# Patient Record
Sex: FEMALE | Race: WHITE | Employment: FULL TIME | URBAN - METROPOLITAN AREA
[De-identification: names, ages, dates, MRNs, and addresses within clinical notes are randomized per-mention and may not be internally consistent; named-entity substitution may affect disease eponyms.]

---

## 2019-01-08 ENCOUNTER — HOSPITAL ENCOUNTER (EMERGENCY)
Age: 59
Discharge: HOME | End: 2019-01-08
Attending: EMERGENCY MEDICINE
Payer: COMMERCIAL

## 2019-01-08 ENCOUNTER — APPOINTMENT (OUTPATIENT)
Dept: GENERAL RADIOLOGY | Age: 59
End: 2019-01-08
Attending: EMERGENCY MEDICINE
Payer: COMMERCIAL

## 2019-01-08 VITALS
HEART RATE: 80 BPM | OXYGEN SATURATION: 98 % | HEIGHT: 65 IN | DIASTOLIC BLOOD PRESSURE: 88 MMHG | SYSTOLIC BLOOD PRESSURE: 198 MMHG | WEIGHT: 260 LBS | RESPIRATION RATE: 16 BRPM | TEMPERATURE: 98 F | BODY MASS INDEX: 43.32 KG/M2

## 2019-01-08 DIAGNOSIS — M54.31 SCIATICA OF RIGHT SIDE: Primary | ICD-10-CM

## 2019-01-08 DIAGNOSIS — I10 HYPERTENSION, UNSPECIFIED TYPE: ICD-10-CM

## 2019-01-08 LAB — GLUCOSE BLD STRIP.AUTO-MCNC: 113 MG/DL (ref 65–100)

## 2019-01-08 PROCEDURE — 74011250637 HC RX REV CODE- 250/637: Performed by: EMERGENCY MEDICINE

## 2019-01-08 PROCEDURE — 99283 EMERGENCY DEPT VISIT LOW MDM: CPT | Performed by: EMERGENCY MEDICINE

## 2019-01-08 PROCEDURE — 73502 X-RAY EXAM HIP UNI 2-3 VIEWS: CPT

## 2019-01-08 PROCEDURE — 82962 GLUCOSE BLOOD TEST: CPT

## 2019-01-08 RX ORDER — CLONIDINE HYDROCHLORIDE 0.1 MG/1
0.1 TABLET ORAL
Status: COMPLETED | OUTPATIENT
Start: 2019-01-08 | End: 2019-01-08

## 2019-01-08 RX ORDER — PREDNISONE 20 MG/1
TABLET ORAL
Qty: 13 TAB | Refills: 0 | Status: SHIPPED | OUTPATIENT
Start: 2019-01-08 | End: 2020-07-28

## 2019-01-08 RX ORDER — LISINOPRIL AND HYDROCHLOROTHIAZIDE 20; 25 MG/1; MG/1
1 TABLET ORAL DAILY
Qty: 30 TAB | Refills: 0 | Status: SHIPPED | OUTPATIENT
Start: 2019-01-08 | End: 2019-02-10

## 2019-01-08 RX ORDER — METAXALONE 800 MG/1
800 TABLET ORAL 4 TIMES DAILY
Qty: 20 TAB | Refills: 0 | Status: SHIPPED | OUTPATIENT
Start: 2019-01-08 | End: 2019-01-13

## 2019-01-08 RX ADMIN — CLONIDINE HYDROCHLORIDE 0.1 MG: 0.1 TABLET ORAL at 15:19

## 2019-01-08 NOTE — ED NOTES
I have reviewed discharge instructions with the patient. The patient verbalized understanding. Patient left ED via Discharge Method: ambulatory to Home with self. Opportunity for questions and clarification provided. Patient given 2 scripts. To continue your aftercare when you leave the hospital, you may receive an automated call from our care team to check in on how you are doing. This is a free service and part of our promise to provide the best care and service to meet your aftercare needs.  If you have questions, or wish to unsubscribe from this service please call [phone]. Thank you for Choosing our [facility] Emergency Department.

## 2019-01-08 NOTE — ED PROVIDER NOTES
Patient is a 77-year-old female with a history of hypertension, sciatica, chronic pain who comes to the ER today with multiple different complaints. She has been without a doctor for a while and not taking any prescription medications. She states that in the past few weeks she is having worsening pain in her lower back and her right hip. She has to stand all day for work and the pain is getting worse with that. She denies any bowel or bladder incontinence. The pain does radiate down the right leg. She is also noted her blood pressure to be elevated and has not been taking her medications for some time. She also complains of other various tingling sensations in her hands bilaterally and thinks she might be diabetic. The history is provided by the patient. Hip Pain This is a chronic problem. The current episode started more than 1 week ago. The problem occurs constantly. The problem has been gradually worsening. The pain is present in the right hip. The quality of the pain is described as aching and sharp. The pain is severe. Associated symptoms include numbness, tingling and back pain. Pertinent negatives include full range of motion and no neck pain. The symptoms are aggravated by movement, standing and activity. She has tried OTC pain medications for the symptoms. The treatment provided no relief. There has been no history of extremity trauma. Past Medical History:  
Diagnosis Date  Hypertension Past Surgical History:  
Procedure Laterality Date  HX ORTHOPAEDIC No family history on file. Social History Socioeconomic History  Marital status: SINGLE Spouse name: Not on file  Number of children: Not on file  Years of education: Not on file  Highest education level: Not on file Social Needs  Financial resource strain: Not on file  Food insecurity - worry: Not on file  Food insecurity - inability: Not on file  Transportation needs - medical: Not on file  Transportation needs - non-medical: Not on file Occupational History  Not on file Tobacco Use  Smoking status: Never Smoker Substance and Sexual Activity  Alcohol use: No  
 Drug use: No  
 Sexual activity: Not on file Other Topics Concern  Not on file Social History Narrative  Not on file ALLERGIES: Lortab [hydrocodone-acetaminophen] Review of Systems Constitutional: Negative for chills, diaphoresis, fatigue and fever. HENT: Negative. Eyes: Negative. Respiratory: Negative. Cardiovascular: Negative. Gastrointestinal: Negative. Endocrine: Negative. Genitourinary: Negative. Musculoskeletal: Positive for back pain. Negative for neck pain. Skin: Negative. Neurological: Positive for tingling, weakness and numbness. Negative for seizures and syncope. Vitals:  
 01/08/19 1437 BP: (!) 206/106 Pulse: 79 Resp: 16 Temp: 98.2 °F (36.8 °C) SpO2: 96% Weight: 117.9 kg (260 lb) Height: 5' 5\" (1.651 m) Physical Exam  
Constitutional: She is oriented to person, place, and time. She appears well-developed and well-nourished. overweight HENT:  
Head: Normocephalic and atraumatic. Eyes: Conjunctivae and EOM are normal. Pupils are equal, round, and reactive to light. Neck: Normal range of motion. Neck supple. Cardiovascular: Normal rate, regular rhythm and intact distal pulses. Pulmonary/Chest: Effort normal and breath sounds normal.  
Abdominal: Soft. There is no tenderness. There is no rebound and no guarding. Musculoskeletal: Normal range of motion. She exhibits tenderness. She exhibits no edema or deformity. Tender right hip and pain with any range of motion of the right hip Lymphadenopathy:  
  She has no cervical adenopathy. Neurological: She is alert and oriented to person, place, and time.  She has normal strength. No cranial nerve deficit or sensory deficit. GCS eye subscore is 4. GCS verbal subscore is 5. GCS motor subscore is 6. Skin: Skin is warm and dry. Capillary refill takes less than 2 seconds. No rash noted. Nursing note and vitals reviewed. MDM Number of Diagnoses or Management Options Diagnosis management comments: 3:46 PM 
Blood sugar is 113. Patient is reassured and advised she can follow up with a primary care doctor to follow this value. X-rays of the right hip are negative Clinically this all appears to be sciatica and untreated hypertension I will refill her blood pressure medicine and I will also prescribe a course of prednisone. She also states she has taken Skelaxin in the past with relief. Voice dictation software was used during the making of this note. This software is not perfect and grammatical and other typographical errors may be present. This note has been proofread, but may still contain errors. Saira Powers MD; 1/8/2019 @3:47 PM  
=================================================================== Amount and/or Complexity of Data Reviewed Clinical lab tests: ordered and reviewed Tests in the radiology section of CPT®: ordered and reviewed Review and summarize past medical records: yes Risk of Complications, Morbidity, and/or Mortality Presenting problems: low Diagnostic procedures: low Management options: low Patient Progress Patient progress: stable Procedures

## 2019-01-08 NOTE — DISCHARGE INSTRUCTIONS
Use the medications as prescribed and follow up with a primary care physician. Return to the emergency department for any other acute concerns.

## 2019-01-08 NOTE — ED TRIAGE NOTES
Patient reports right hip and leg pain when standing for last 4 days. Hx sciatica. HTN in triage. Chronic burning to feet and hands. States stopped taking all medications because \"primary doctor quit\".

## 2019-02-10 ENCOUNTER — HOSPITAL ENCOUNTER (EMERGENCY)
Age: 59
Discharge: HOME | End: 2019-02-10
Attending: EMERGENCY MEDICINE
Payer: COMMERCIAL

## 2019-02-10 VITALS
RESPIRATION RATE: 18 BRPM | WEIGHT: 248 LBS | BODY MASS INDEX: 42.34 KG/M2 | HEIGHT: 64 IN | SYSTOLIC BLOOD PRESSURE: 205 MMHG | TEMPERATURE: 98.7 F | HEART RATE: 80 BPM | OXYGEN SATURATION: 98 % | DIASTOLIC BLOOD PRESSURE: 122 MMHG

## 2019-02-10 DIAGNOSIS — Z76.0 MEDICATION REFILL: Primary | ICD-10-CM

## 2019-02-10 DIAGNOSIS — I10 ESSENTIAL HYPERTENSION: ICD-10-CM

## 2019-02-10 PROCEDURE — 99282 EMERGENCY DEPT VISIT SF MDM: CPT | Performed by: EMERGENCY MEDICINE

## 2019-02-10 RX ORDER — LISINOPRIL AND HYDROCHLOROTHIAZIDE 20; 25 MG/1; MG/1
1 TABLET ORAL DAILY
Qty: 30 TAB | Refills: 0 | Status: SHIPPED | OUTPATIENT
Start: 2019-02-10

## 2019-02-10 NOTE — ED PROVIDER NOTES
Patient has been out of lisinopril for 2 days. This medication was just recently started in the last few weeks. Patient has been on blood pressure medication in the past before. Blood pressures extremely elevated today patient is asymptomatic and denies chest pain, trouble breathing, headache, numbness tingling or weakness or any abdominal pain. No confusion or disorientation. She has an appointment later this month with Dr. Cinthya Santoyo. The history is provided by the patient. Medication Refill Pertinent negatives include no chest pain, no abdominal pain, no headaches and no shortness of breath. Past Medical History:  
Diagnosis Date  Hypertension Past Surgical History:  
Procedure Laterality Date  HX ORTHOPAEDIC No family history on file. Social History Socioeconomic History  Marital status: SINGLE Spouse name: Not on file  Number of children: Not on file  Years of education: Not on file  Highest education level: Not on file Social Needs  Financial resource strain: Not on file  Food insecurity - worry: Not on file  Food insecurity - inability: Not on file  Transportation needs - medical: Not on file  Transportation needs - non-medical: Not on file Occupational History  Not on file Tobacco Use  Smoking status: Never Smoker Substance and Sexual Activity  Alcohol use: No  
 Drug use: No  
 Sexual activity: Not on file Other Topics Concern  Not on file Social History Narrative  Not on file ALLERGIES: Lortab [hydrocodone-acetaminophen] Review of Systems Respiratory: Negative for chest tightness and shortness of breath. Cardiovascular: Negative for chest pain. Gastrointestinal: Negative for abdominal pain. Neurological: Negative for weakness, numbness and headaches. Psychiatric/Behavioral: Negative for confusion. Vitals:  
 02/10/19 4689 BP: (!) 201/127 Pulse: 80 Resp: 18  
 Temp: 98.7 °F (37.1 °C) SpO2: 98% Weight: 112.5 kg (248 lb) Height: 5' 4\" (1.626 m) Physical Exam  
Constitutional: She appears well-nourished. obese HENT:  
Mouth/Throat: Oropharynx is clear and moist.  
Eyes: Conjunctivae are normal.  
Cardiovascular: Normal rate, regular rhythm and normal heart sounds. Pulmonary/Chest: Effort normal and breath sounds normal.  
Abdominal: Soft. She exhibits no distension. There is no tenderness. Neurological: She is alert. Nursing note and vitals reviewed. MDM Number of Diagnoses or Management Options Diagnosis management comments: Asymptomatic hypertension. Medication refilled. Discussed the importance of high blood pressure treatment and the consequences of no treatment. Patient has follow-up appointment previously scheduled. Procedures

## 2019-02-10 NOTE — PROGRESS NOTES
I have reviewed discharge instructions with the patient. The patient verbalized understanding. Patient left ED via Discharge Method: ambulatory to Home with self Opportunity for questions and clarification provided. Patient given 1 scripts. To continue your aftercare when you leave the hospital, you may receive an automated call from our care team to check in on how you are doing. This is a free service and part of our promise to provide the best care and service to meet your aftercare needs.  If you have questions, or wish to unsubscribe from this service please call [phone]. Thank you for Choosing our [facility] Emergency Department.

## 2019-02-10 NOTE — ED TRIAGE NOTES
Patient reports needing Lisinopril 20 mg refill. Has PCP appointment 3/1/19 but ran out of pills yesterday. [facility] would not refill it with out being re seen. Patient has no complaints Telephone Encounter by Shaina Quintana APN at 10/15/18 08:45 PM     Author:  Shaina Quintana APN Service:  (none) Author Type:  Nurse Practitioner     Filed:  10/15/18 08:45 PM Encounter Date:  10/14/2018 Status:  Signed     :  Shaina Quintana APN (Nurse Practitioner)            See [employer] message.[TW1.1M]      Revision History        User Key Date/Time User Provider Type Action    > TW1.1 10/15/18 08:45 PM Shaina Quintana APN Nurse Practitioner Sign    M - Manual

## 2019-02-10 NOTE — DISCHARGE INSTRUCTIONS
Patient Education        Learning About ACE Inhibitors  Introduction    ACE (angiotensin-converting enzyme) inhibitors stop the release of an enzyme. This enzyme makes your blood vessels smaller. Without it, your blood vessels relax and get bigger. This lowers your blood pressure. These medicines also increase how much water and salt go into your urine. This also lowers blood pressure. You may take this kind of medicine if you have high blood pressure. Or you may take it if you have heart problems, kidney problems, or diabetes. If you have coronary artery disease, this medicine can help prevent heart attacks and strokes. Examples  · Benazepril (Lotensin)  · Lisinopril (Prinivil, Zestril)  · Ramipril (Altace)  This is not a complete list.  Possible side effects  Side effects may include:  · A cough. · Low blood pressure. This can make you feel dizzy or weak. · Too much potassium in your body. · Swelling. This may be a sign of an allergic reaction. You may have other side effects or reactions not listed here. Check the information that comes with your medicine. What to know about taking this medicine  · ACE inhibitors can cause a dry cough. Talk to your doctor if you have a dry cough. You may need a different medicine. · These medicines can cause an allergic reaction. This can cause a little swelling. Or it can cause red bumps on your skin that hurt. In rare cases, the swelling may make it hard for you to breathe. · Do not take this medicine if you are pregnant. And don't take it if you plan to become pregnant. · Take your medicines exactly as prescribed. Call your doctor if you think you are having a problem with your medicine. · Check with your doctor or pharmacist before you use any other medicines. This includes over-the-counter medicines. Make sure your doctor knows all of the medicines, vitamins, herbal products, and supplements you take. Taking some medicines together can cause problems.   · You may need regular blood tests. Where can you learn more? Go to http://deborah-kasia.info/. Enter P050 in the search box to learn more about \"Learning About ACE Inhibitors. \"  Current as of: July 22, 2018  Content Version: 11.9  © 5550-1500 [employer]. Care instructions adapted under license by [employer] (which disclaims liability or warranty for this information). If you have questions about a medical condition or this instruction, always ask your healthcare professional. [place] [address] any warranty or liability for your use of this information. Patient Education        High Blood Pressure: Care Instructions  Overview    It's normal for blood pressure to go up and down throughout the day. But if it stays up, you have high blood pressure. Another name for high blood pressure is hypertension. Despite what a lot of people think, high blood pressure usually doesn't cause headaches or make you feel dizzy or lightheaded. It usually has no symptoms. But it does increase your risk of stroke, heart attack, and other problems. You and your doctor will talk about your risks of these problems based on your blood pressure. Your doctor will give you a goal for your blood pressure. Your goal will be based on your health and your age. Lifestyle changes, such as eating healthy and being active, are always important to help lower blood pressure. You might also take medicine to reach your blood pressure goal.  Follow-up care is a key part of your treatment and safety. Be sure to make and go to all appointments, and call your doctor if you are having problems. It's also a good idea to know your test results and keep a list of the medicines you take. How can you care for yourself at home? Medical treatment  · If you stop taking your medicine, your blood pressure will go back up. You may take one or more types of medicine to lower your blood pressure.  Be safe with medicines. Take your medicine exactly as prescribed. Call your doctor if you think you are having a problem with your medicine. · Talk to your doctor before you start taking aspirin every day. Aspirin can help certain people lower their risk of a heart attack or stroke. But taking aspirin isn't right for everyone, because it can cause serious bleeding. · See your doctor regularly. You may need to see the doctor more often at first or until your blood pressure comes down. · If you are taking blood pressure medicine, talk to your doctor before you take decongestants or anti-inflammatory medicine, such as ibuprofen. Some of these medicines can raise blood pressure. · Learn how to check your blood pressure at home. Lifestyle changes  · Stay at a healthy weight. This is especially important if you put on weight around the waist. Losing even 10 pounds can help you lower your blood pressure. · If your doctor recommends it, get more exercise. Walking is a good choice. Bit by bit, increase the amount you walk every day. Try for at least 30 minutes on most days of the week. You also may want to swim, bike, or do other activities. · Avoid or limit alcohol. Talk to your doctor about whether you can drink any alcohol. · Try to limit how much sodium you eat to less than 2,300 milligrams (mg) a day. Your doctor may ask you to try to eat less than 1,500 mg a day. · Eat plenty of fruits (such as bananas and oranges), vegetables, legumes, whole grains, and low-fat dairy products. · Lower the amount of saturated fat in your diet. Saturated fat is found in animal products such as milk, cheese, and meat. Limiting these foods may help you lose weight and also lower your risk for heart disease. · Do not smoke. Smoking increases your risk for heart attack and stroke. If you need help quitting, talk to your doctor about stop-smoking programs and medicines. These can increase your chances of quitting for good.   When should you call for help? Call 911 anytime you think you may need emergency care. This may mean having symptoms that suggest that your blood pressure is causing a serious heart or blood vessel problem. Your blood pressure may be over 180/120.   For example, call 911 if:    · You have symptoms of a heart attack. These may include:  ? Chest pain or pressure, or a strange feeling in the chest.  ? Sweating. ? Shortness of breath. ? Nausea or vomiting. ? Pain, pressure, or a strange feeling in the back, neck, jaw, or upper belly or in one or both shoulders or arms. ? Lightheadedness or sudden weakness. ? A fast or irregular heartbeat.     · You have symptoms of a stroke. These may include:  ? Sudden numbness, tingling, weakness, or loss of movement in your face, arm, or leg, especially on only one side of your body. ? Sudden vision changes. ? Sudden trouble speaking. ? Sudden confusion or trouble understanding simple statements. ? Sudden problems with walking or balance. ? A sudden, severe headache that is different from past headaches.     · You have severe back or belly pain.    Do not wait until your blood pressure comes down on its own. Get help right away.   Call your doctor now or seek immediate care if:    · Your blood pressure is much higher than normal (such as 180/120 or higher), but you don't have symptoms.     · You think high blood pressure is causing symptoms, such as:  ? Severe headache.  ? Blurry vision.    Watch closely for changes in your health, and be sure to contact your doctor if:    · Your blood pressure measures higher than your doctor recommends at least 2 times. That means the top number is higher or the bottom number is higher, or both.     · You think you may be having side effects from your blood pressure medicine. Where can you learn more? Go to http://deborah-kasia.info/.   Enter U929 in the search box to learn more about \"High Blood Pressure: Care Instructions. \"  Current as of: July 22, 2018  Content Version: 11.9  © 7123-1419 [employer], [facility]. Care instructions adapted under license by [employer] (which disclaims liability or warranty for this information). If you have questions about a medical condition or this instruction, always ask your healthcare professional. [place] [address] any warranty or liability for your use of this information.

## 2020-07-20 ENCOUNTER — HOSPITAL ENCOUNTER (INPATIENT)
Age: 60
LOS: 8 days | Discharge: HOME HEALTH | DRG: 872 | End: 2020-07-28
Attending: EMERGENCY MEDICINE | Admitting: FAMILY MEDICINE
Payer: SUBSIDIZED

## 2020-07-20 ENCOUNTER — APPOINTMENT (OUTPATIENT)
Dept: ULTRASOUND IMAGING | Age: 60
DRG: 872 | End: 2020-07-20
Attending: EMERGENCY MEDICINE
Payer: SUBSIDIZED

## 2020-07-20 ENCOUNTER — APPOINTMENT (OUTPATIENT)
Dept: GENERAL RADIOLOGY | Age: 60
DRG: 872 | End: 2020-07-20
Attending: EMERGENCY MEDICINE
Payer: SUBSIDIZED

## 2020-07-20 ENCOUNTER — APPOINTMENT (OUTPATIENT)
Dept: CT IMAGING | Age: 60
DRG: 872 | End: 2020-07-20
Attending: EMERGENCY MEDICINE
Payer: SUBSIDIZED

## 2020-07-20 DIAGNOSIS — M54.31 BILATERAL SCIATICA: ICD-10-CM

## 2020-07-20 DIAGNOSIS — I10 MALIGNANT HYPERTENSION: Primary | ICD-10-CM

## 2020-07-20 DIAGNOSIS — R11.2 NON-INTRACTABLE VOMITING WITH NAUSEA, UNSPECIFIED VOMITING TYPE: ICD-10-CM

## 2020-07-20 DIAGNOSIS — M54.32 BILATERAL SCIATICA: ICD-10-CM

## 2020-07-20 DIAGNOSIS — M46.20 OSTEOMYELITIS OF SPINE (HCC): ICD-10-CM

## 2020-07-20 DIAGNOSIS — D35.01 ADRENAL ADENOMA, RIGHT: ICD-10-CM

## 2020-07-20 DIAGNOSIS — A41.01 SEPSIS DUE TO METHICILLIN SUSCEPTIBLE STAPHYLOCOCCUS AUREUS (MSSA) WITHOUT ACUTE ORGAN DYSFUNCTION (HCC): ICD-10-CM

## 2020-07-20 DIAGNOSIS — E66.01 MORBIDLY OBESE (HCC): ICD-10-CM

## 2020-07-20 DIAGNOSIS — R78.81 BACTEREMIA: ICD-10-CM

## 2020-07-20 PROBLEM — M19.90 ARTHRITIS: Status: ACTIVE | Noted: 2020-07-20

## 2020-07-20 PROBLEM — R65.10 SIRS (SYSTEMIC INFLAMMATORY RESPONSE SYNDROME) (HCC): Status: ACTIVE | Noted: 2020-07-20

## 2020-07-20 PROBLEM — E27.8 MASS OF RIGHT ADRENAL GLAND (HCC): Status: ACTIVE | Noted: 2020-07-20

## 2020-07-20 PROBLEM — E87.6 HYPOKALEMIA: Status: ACTIVE | Noted: 2020-07-20

## 2020-07-20 LAB
ALBUMIN SERPL-MCNC: 2.9 G/DL (ref 3.5–5)
ALBUMIN/GLOB SERPL: 0.6 {RATIO} (ref 1.2–3.5)
ALP SERPL-CCNC: 121 U/L (ref 50–136)
ALT SERPL-CCNC: 50 U/L (ref 12–65)
ANION GAP SERPL CALC-SCNC: 8 MMOL/L (ref 7–16)
AST SERPL-CCNC: 44 U/L (ref 15–37)
ATRIAL RATE: 94 BPM
BACTERIA URNS QL MICRO: 0 /HPF
BASOPHILS # BLD: 0 K/UL (ref 0–0.2)
BASOPHILS NFR BLD: 0 % (ref 0–2)
BILIRUB SERPL-MCNC: 0.8 MG/DL (ref 0.2–1.1)
BUN SERPL-MCNC: 14 MG/DL (ref 6–23)
CALCIUM SERPL-MCNC: 8.7 MG/DL (ref 8.3–10.4)
CALCULATED P AXIS, ECG09: 44 DEGREES
CALCULATED R AXIS, ECG10: 18 DEGREES
CALCULATED T AXIS, ECG11: 9 DEGREES
CASTS URNS QL MICRO: NORMAL /LPF
CHLORIDE SERPL-SCNC: 104 MMOL/L (ref 98–107)
CO2 SERPL-SCNC: 25 MMOL/L (ref 21–32)
CREAT SERPL-MCNC: 0.82 MG/DL (ref 0.6–1)
DIAGNOSIS, 93000: NORMAL
DIFFERENTIAL METHOD BLD: ABNORMAL
EOSINOPHIL # BLD: 0 K/UL (ref 0–0.8)
EOSINOPHIL NFR BLD: 0 % (ref 0.5–7.8)
EPI CELLS #/AREA URNS HPF: NORMAL /HPF
ERYTHROCYTE [DISTWIDTH] IN BLOOD BY AUTOMATED COUNT: 14.7 % (ref 11.9–14.6)
EST. AVERAGE GLUCOSE BLD GHB EST-MCNC: 131 MG/DL
GLOBULIN SER CALC-MCNC: 4.8 G/DL (ref 2.3–3.5)
GLUCOSE SERPL-MCNC: 122 MG/DL (ref 65–100)
HBA1C MFR BLD: 6.2 % (ref 4.8–6)
HCT VFR BLD AUTO: 39.6 % (ref 35.8–46.3)
HGB BLD-MCNC: 12.8 G/DL (ref 11.7–15.4)
IMM GRANULOCYTES # BLD AUTO: 0.2 K/UL (ref 0–0.5)
IMM GRANULOCYTES NFR BLD AUTO: 1 % (ref 0–5)
LACTATE SERPL-SCNC: 0.9 MMOL/L (ref 0.4–2)
LYMPHOCYTES # BLD: 0.5 K/UL (ref 0.5–4.6)
LYMPHOCYTES NFR BLD: 3 % (ref 13–44)
MAGNESIUM SERPL-MCNC: 1.8 MG/DL (ref 1.8–2.4)
MCH RBC QN AUTO: 28.9 PG (ref 26.1–32.9)
MCHC RBC AUTO-ENTMCNC: 32.3 G/DL (ref 31.4–35)
MCV RBC AUTO: 89.4 FL (ref 79.6–97.8)
MONOCYTES # BLD: 0.8 K/UL (ref 0.1–1.3)
MONOCYTES NFR BLD: 4 % (ref 4–12)
NEUTS SEG # BLD: 17.5 K/UL (ref 1.7–8.2)
NEUTS SEG NFR BLD: 92 % (ref 43–78)
NRBC # BLD: 0 K/UL (ref 0–0.2)
P-R INTERVAL, ECG05: 126 MS
PLATELET # BLD AUTO: 189 K/UL (ref 150–450)
PMV BLD AUTO: 12.3 FL (ref 9.4–12.3)
POTASSIUM SERPL-SCNC: 3 MMOL/L (ref 3.5–5.1)
PROT SERPL-MCNC: 7.7 G/DL (ref 6.3–8.2)
Q-T INTERVAL, ECG07: 370 MS
QRS DURATION, ECG06: 90 MS
QTC CALCULATION (BEZET), ECG08: 462 MS
RBC # BLD AUTO: 4.43 M/UL (ref 4.05–5.2)
RBC #/AREA URNS HPF: NORMAL /HPF
SODIUM SERPL-SCNC: 137 MMOL/L (ref 136–145)
TROPONIN-HIGH SENSITIVITY: 5.2 PG/ML (ref 0–14)
VENTRICULAR RATE, ECG03: 94 BPM
WBC # BLD AUTO: 19 K/UL (ref 4.3–11.1)
WBC URNS QL MICRO: NORMAL /HPF

## 2020-07-20 PROCEDURE — 65660000000 HC RM CCU STEPDOWN

## 2020-07-20 PROCEDURE — 74011250637 HC RX REV CODE- 250/637: Performed by: FAMILY MEDICINE

## 2020-07-20 PROCEDURE — 71046 X-RAY EXAM CHEST 2 VIEWS: CPT

## 2020-07-20 PROCEDURE — 65270000029 HC RM PRIVATE

## 2020-07-20 PROCEDURE — 87040 BLOOD CULTURE FOR BACTERIA: CPT

## 2020-07-20 PROCEDURE — 87186 SC STD MICRODIL/AGAR DIL: CPT

## 2020-07-20 PROCEDURE — 74011000250 HC RX REV CODE- 250: Performed by: EMERGENCY MEDICINE

## 2020-07-20 PROCEDURE — 96365 THER/PROPH/DIAG IV INF INIT: CPT

## 2020-07-20 PROCEDURE — 74011250636 HC RX REV CODE- 250/636: Performed by: FAMILY MEDICINE

## 2020-07-20 PROCEDURE — 83036 HEMOGLOBIN GLYCOSYLATED A1C: CPT

## 2020-07-20 PROCEDURE — 71270 CT THORAX DX C-/C+: CPT

## 2020-07-20 PROCEDURE — 85025 COMPLETE CBC W/AUTO DIFF WBC: CPT

## 2020-07-20 PROCEDURE — 74011250637 HC RX REV CODE- 250/637: Performed by: EMERGENCY MEDICINE

## 2020-07-20 PROCEDURE — 96376 TX/PRO/DX INJ SAME DRUG ADON: CPT

## 2020-07-20 PROCEDURE — 74011250636 HC RX REV CODE- 250/636: Performed by: EMERGENCY MEDICINE

## 2020-07-20 PROCEDURE — 77030038269 HC DRN EXT URIN PURWCK BARD -A

## 2020-07-20 PROCEDURE — 83735 ASSAY OF MAGNESIUM: CPT

## 2020-07-20 PROCEDURE — 76705 ECHO EXAM OF ABDOMEN: CPT

## 2020-07-20 PROCEDURE — 80053 COMPREHEN METABOLIC PANEL: CPT

## 2020-07-20 PROCEDURE — 96366 THER/PROPH/DIAG IV INF ADDON: CPT

## 2020-07-20 PROCEDURE — 83605 ASSAY OF LACTIC ACID: CPT

## 2020-07-20 PROCEDURE — 74011636320 HC RX REV CODE- 636/320: Performed by: EMERGENCY MEDICINE

## 2020-07-20 PROCEDURE — 87205 SMEAR GRAM STAIN: CPT

## 2020-07-20 PROCEDURE — 87150 DNA/RNA AMPLIFIED PROBE: CPT

## 2020-07-20 PROCEDURE — 96375 TX/PRO/DX INJ NEW DRUG ADDON: CPT

## 2020-07-20 PROCEDURE — 74011000258 HC RX REV CODE- 258: Performed by: EMERGENCY MEDICINE

## 2020-07-20 PROCEDURE — 74011000250 HC RX REV CODE- 250: Performed by: FAMILY MEDICINE

## 2020-07-20 PROCEDURE — 96368 THER/DIAG CONCURRENT INF: CPT

## 2020-07-20 PROCEDURE — 87077 CULTURE AEROBIC IDENTIFY: CPT

## 2020-07-20 PROCEDURE — 84484 ASSAY OF TROPONIN QUANT: CPT

## 2020-07-20 PROCEDURE — 74011000258 HC RX REV CODE- 258: Performed by: FAMILY MEDICINE

## 2020-07-20 PROCEDURE — 96372 THER/PROPH/DIAG INJ SC/IM: CPT

## 2020-07-20 PROCEDURE — 93005 ELECTROCARDIOGRAM TRACING: CPT | Performed by: EMERGENCY MEDICINE

## 2020-07-20 PROCEDURE — 99285 EMERGENCY DEPT VISIT HI MDM: CPT

## 2020-07-20 PROCEDURE — 81015 MICROSCOPIC EXAM OF URINE: CPT

## 2020-07-20 RX ORDER — BISACODYL 5 MG
5 TABLET, DELAYED RELEASE (ENTERIC COATED) ORAL DAILY PRN
Status: DISCONTINUED | OUTPATIENT
Start: 2020-07-20 | End: 2020-07-28 | Stop reason: HOSPADM

## 2020-07-20 RX ORDER — MORPHINE SULFATE 4 MG/ML
4 INJECTION INTRAVENOUS
Status: COMPLETED | OUTPATIENT
Start: 2020-07-20 | End: 2020-07-20

## 2020-07-20 RX ORDER — LABETALOL HYDROCHLORIDE 5 MG/ML
20 INJECTION, SOLUTION INTRAVENOUS ONCE
Status: COMPLETED | OUTPATIENT
Start: 2020-07-20 | End: 2020-07-20

## 2020-07-20 RX ORDER — ONDANSETRON 2 MG/ML
4 INJECTION INTRAMUSCULAR; INTRAVENOUS
Status: COMPLETED | OUTPATIENT
Start: 2020-07-20 | End: 2020-07-20

## 2020-07-20 RX ORDER — ONDANSETRON 2 MG/ML
4 INJECTION INTRAMUSCULAR; INTRAVENOUS
Status: DISCONTINUED | OUTPATIENT
Start: 2020-07-20 | End: 2020-07-28 | Stop reason: HOSPADM

## 2020-07-20 RX ORDER — HYDRALAZINE HYDROCHLORIDE 20 MG/ML
10 INJECTION INTRAMUSCULAR; INTRAVENOUS ONCE
Status: COMPLETED | OUTPATIENT
Start: 2020-07-20 | End: 2020-07-20

## 2020-07-20 RX ORDER — MORPHINE SULFATE 2 MG/ML
1 INJECTION, SOLUTION INTRAMUSCULAR; INTRAVENOUS
Status: DISCONTINUED | OUTPATIENT
Start: 2020-07-20 | End: 2020-07-28 | Stop reason: HOSPADM

## 2020-07-20 RX ORDER — DIPHENHYDRAMINE HYDROCHLORIDE 50 MG/ML
12.5 INJECTION, SOLUTION INTRAMUSCULAR; INTRAVENOUS
Status: DISCONTINUED | OUTPATIENT
Start: 2020-07-20 | End: 2020-07-28 | Stop reason: HOSPADM

## 2020-07-20 RX ORDER — HYDRALAZINE HYDROCHLORIDE 50 MG/1
50 TABLET, FILM COATED ORAL 3 TIMES DAILY
Status: DISCONTINUED | OUTPATIENT
Start: 2020-07-20 | End: 2020-07-21

## 2020-07-20 RX ORDER — NALOXONE HYDROCHLORIDE 0.4 MG/ML
0.4 INJECTION, SOLUTION INTRAMUSCULAR; INTRAVENOUS; SUBCUTANEOUS AS NEEDED
Status: DISCONTINUED | OUTPATIENT
Start: 2020-07-20 | End: 2020-07-28 | Stop reason: HOSPADM

## 2020-07-20 RX ORDER — HEPARIN SODIUM 5000 [USP'U]/ML
5000 INJECTION, SOLUTION INTRAVENOUS; SUBCUTANEOUS EVERY 8 HOURS
Status: DISCONTINUED | OUTPATIENT
Start: 2020-07-20 | End: 2020-07-21

## 2020-07-20 RX ORDER — SODIUM CHLORIDE 0.9 % (FLUSH) 0.9 %
10 SYRINGE (ML) INJECTION
Status: COMPLETED | OUTPATIENT
Start: 2020-07-20 | End: 2020-07-20

## 2020-07-20 RX ORDER — ISOSORBIDE MONONITRATE 30 MG/1
30 TABLET, EXTENDED RELEASE ORAL DAILY
Status: DISCONTINUED | OUTPATIENT
Start: 2020-07-20 | End: 2020-07-21

## 2020-07-20 RX ORDER — LANOLIN ALCOHOL/MO/W.PET/CERES
400 CREAM (GRAM) TOPICAL 2 TIMES DAILY
Status: DISCONTINUED | OUTPATIENT
Start: 2020-07-20 | End: 2020-07-28 | Stop reason: HOSPADM

## 2020-07-20 RX ORDER — SODIUM CHLORIDE 0.9 % (FLUSH) 0.9 %
5-40 SYRINGE (ML) INJECTION EVERY 8 HOURS
Status: DISCONTINUED | OUTPATIENT
Start: 2020-07-20 | End: 2020-07-28 | Stop reason: HOSPADM

## 2020-07-20 RX ORDER — SODIUM CHLORIDE 0.9 % (FLUSH) 0.9 %
5-40 SYRINGE (ML) INJECTION AS NEEDED
Status: DISCONTINUED | OUTPATIENT
Start: 2020-07-20 | End: 2020-07-28 | Stop reason: HOSPADM

## 2020-07-20 RX ORDER — ACETAMINOPHEN 325 MG/1
650 TABLET ORAL
Status: DISCONTINUED | OUTPATIENT
Start: 2020-07-20 | End: 2020-07-28 | Stop reason: HOSPADM

## 2020-07-20 RX ORDER — POTASSIUM CHLORIDE 20 MEQ/1
40 TABLET, EXTENDED RELEASE ORAL EVERY 4 HOURS
Status: COMPLETED | OUTPATIENT
Start: 2020-07-20 | End: 2020-07-20

## 2020-07-20 RX ORDER — KETOROLAC TROMETHAMINE 30 MG/ML
15 INJECTION, SOLUTION INTRAMUSCULAR; INTRAVENOUS
Status: COMPLETED | OUTPATIENT
Start: 2020-07-20 | End: 2020-07-20

## 2020-07-20 RX ORDER — LISINOPRIL AND HYDROCHLOROTHIAZIDE 20; 25 MG/1; MG/1
1 TABLET ORAL
Status: COMPLETED | OUTPATIENT
Start: 2020-07-20 | End: 2020-07-20

## 2020-07-20 RX ORDER — LISINOPRIL AND HYDROCHLOROTHIAZIDE 20; 25 MG/1; MG/1
1 TABLET ORAL DAILY
Status: DISCONTINUED | OUTPATIENT
Start: 2020-07-21 | End: 2020-07-28 | Stop reason: HOSPADM

## 2020-07-20 RX ORDER — OXYCODONE AND ACETAMINOPHEN 5; 325 MG/1; MG/1
1 TABLET ORAL
Status: DISCONTINUED | OUTPATIENT
Start: 2020-07-20 | End: 2020-07-28 | Stop reason: HOSPADM

## 2020-07-20 RX ADMIN — POTASSIUM CHLORIDE 40 MEQ: 20 TABLET, EXTENDED RELEASE ORAL at 14:31

## 2020-07-20 RX ADMIN — MORPHINE SULFATE 4 MG: 4 INJECTION INTRAVENOUS at 08:56

## 2020-07-20 RX ADMIN — POTASSIUM CHLORIDE 40 MEQ: 20 TABLET, EXTENDED RELEASE ORAL at 22:03

## 2020-07-20 RX ADMIN — Medication 10 ML: at 23:15

## 2020-07-20 RX ADMIN — HYDRALAZINE HYDROCHLORIDE 10 MG: 20 INJECTION INTRAMUSCULAR; INTRAVENOUS at 14:39

## 2020-07-20 RX ADMIN — SODIUM CHLORIDE 100 ML: 900 INJECTION, SOLUTION INTRAVENOUS at 11:57

## 2020-07-20 RX ADMIN — ONDANSETRON 4 MG: 2 INJECTION INTRAMUSCULAR; INTRAVENOUS at 08:56

## 2020-07-20 RX ADMIN — HEPARIN SODIUM 5000 UNITS: 5000 INJECTION INTRAVENOUS; SUBCUTANEOUS at 17:28

## 2020-07-20 RX ADMIN — OXYCODONE HYDROCHLORIDE AND ACETAMINOPHEN 1 TABLET: 5; 325 TABLET ORAL at 22:03

## 2020-07-20 RX ADMIN — SODIUM CHLORIDE 5 MG/HR: 900 INJECTION, SOLUTION INTRAVENOUS at 18:18

## 2020-07-20 RX ADMIN — LISINOPRIL AND HYDROCHLOROTHIAZIDE 1 TABLET: 25; 20 TABLET ORAL at 10:34

## 2020-07-20 RX ADMIN — HYDRALAZINE HYDROCHLORIDE 50 MG: 50 TABLET, FILM COATED ORAL at 23:15

## 2020-07-20 RX ADMIN — Medication 10 ML: at 11:57

## 2020-07-20 RX ADMIN — KETOROLAC TROMETHAMINE 15 MG: 30 INJECTION, SOLUTION INTRAMUSCULAR at 08:56

## 2020-07-20 RX ADMIN — SODIUM CHLORIDE 5 MG/HR: 900 INJECTION, SOLUTION INTRAVENOUS at 14:01

## 2020-07-20 RX ADMIN — ISOSORBIDE MONONITRATE 30 MG: 30 TABLET, EXTENDED RELEASE ORAL at 18:17

## 2020-07-20 RX ADMIN — MAGNESIUM GLUCONATE 500 MG ORAL TABLET 400 MG: 500 TABLET ORAL at 17:29

## 2020-07-20 RX ADMIN — MORPHINE SULFATE 4 MG: 4 INJECTION INTRAVENOUS at 11:24

## 2020-07-20 RX ADMIN — CEFTRIAXONE SODIUM 1 G: 1 INJECTION, POWDER, FOR SOLUTION INTRAMUSCULAR; INTRAVENOUS at 18:17

## 2020-07-20 RX ADMIN — IOPAMIDOL 100 ML: 755 INJECTION, SOLUTION INTRAVENOUS at 11:57

## 2020-07-20 RX ADMIN — LABETALOL HYDROCHLORIDE 20 MG: 5 INJECTION INTRAVENOUS at 11:23

## 2020-07-20 NOTE — H&P
Hospitalist H&P Note     Admit Date:  2020  7:36 AM   Name:  Haris Lynn   Age:  64 y.o.  :  1960   MRN:  [id number]   PCP:  Julianne Perry MD  Treatment Team: Attending Provider: Cezar Ornelas MD; Primary Nurse: Jeff Pineda RN  Vomiting, rt shoulder blade region pain  HPI:   64 yr old  female patient who follows up with [facility], off her blood pressure medication for past 1 week. Known history of hypertension, morbid obesity, arthritis, sciatica and spine issues. Pt says she is off blood pressure meds for a week. Since past 4 days started having sharp pain rt shoulder blade , then both shoulder blade region, constant, moderate intensity, not improving even after several doses of nsaid. Says also has sciatica pain more on the rt lower extremity. Started having vomiting, non bilious , about 7 episodes since last night. Dizziness, on and off since past 4 days ,since her blood pressures have been up. Pt otherwise doesn't c/o head or shortness of breath or any left sided chest pain. Wbc 19,k 3, bp 219/117  CT OF THE CHEST ABDOMEN AND PELVIS  1) Trace right pleural fusion. 2) Benign fat-containing 5.5 cm right adrenal adenoma. Pt presently on cardene drip for elevated bp. Pt will be admitted to ICU for malignant htn, hypokalemia and does meet the criteria for SIRS      10 systems reviewed and negative except as noted in HPI. Past Medical History:   Diagnosis Date    Adrenal adenoma, right 2020    Arthritis 2020    Hypertension       Past Surgical History:   Procedure Laterality Date    HX ORTHOPAEDIC        Allergies   Allergen Reactions    Lortab [Hydrocodone-Acetaminophen] Nausea and Vomiting      Social History     Tobacco Use    Smoking status: Never Smoker   Substance Use Topics    Alcohol use: No      History reviewed. No pertinent family history.    Immunization History   Administered Date(s) Administered    TD Vaccine 08/16/1993     PTA Medications:  Prior to Admission Medications   Prescriptions Last Dose Informant Patient Reported? Taking?   lisinopril-hydroCHLOROthiazide (PRINZIDE, ZESTORETIC) 20-25 mg per tablet   No No   Sig: Take 1 Tab by mouth daily. predniSONE (DELTASONE) 20 mg tablet   No No   Sig: 3 tabs day 1,2 then 2 tabs day 3,4 then one tab day 5,6 then 1/2 tab day 7,8      Facility-Administered Medications: None       Objective:     Patient Vitals for the past 24 hrs:   Temp Pulse Resp BP SpO2   07/20/20 1544  (!) 131 (!) 47 (!) 170/95 92 %   07/20/20 1514  (!) 146  (!) 233/116    07/20/20 1439  (!) 113  (!) 208/103    07/20/20 1423  (!) 106  (!) 222/111    07/20/20 1401  (!) 107  (!) 219/117    07/20/20 1150     93 %   07/20/20 1149     93 %   07/20/20 1148     93 %   07/20/20 1140  99  (!) 172/117 92 %   07/20/20 1135  96      07/20/20 1134  94      07/20/20 1133  95      07/20/20 1132  96      07/20/20 1131  97      07/20/20 1123  (!) 101      07/20/20 1107  87  (!) 215/105    07/20/20 1034  (!) 104  (!) 199/95    07/20/20 0739 99.5 °F (37.5 °C) (!) 103 18 (!) 230/122 95 %     Oxygen Therapy  O2 Sat (%): 92 % (07/20/20 1544)  Pulse via Oximetry: 131 beats per minute (07/20/20 1544)  O2 Device: Nasal cannula (07/20/20 1544)  O2 Flow Rate (L/min): 1 l/min (07/20/20 1544)  No intake or output data in the 24 hours ending 07/20/20 1548    Physical Exam:  General:    Well nourished. Alert. Says pain shoulder blade better after pain medication  Eyes:   Normal sclera. Extraocular movements intact. ENT:  Normocephalic, atraumatic. Moist mucous membranes  CV:   RRR. No murmur, rub, or gallop. Lungs:  CTAB. No wheezing, rhonchi, or rales. mild tender rt scapular region middle part. Abdomen: Soft, nontender, nondistended. Bowel sounds normal. Morbidly obese  Extremities: Warm and dry. No cyanosis or edema. Neurologic: CN II-XII grossly intact.   Sensation intact. Skin:     No rashes or jaundice. Psych:  Normal mood and affect. I reviewed the labs, imaging, EKGs, telemetry, and other studies done this admission. Data Review:   Recent Results (from the past 24 hour(s))   EKG, 12 LEAD, INITIAL    Collection Time: 07/20/20  7:42 AM   Result Value Ref Range    Ventricular Rate 94 BPM    Atrial Rate 94 BPM    P-R Interval 126 ms    QRS Duration 90 ms    Q-T Interval 370 ms    QTC Calculation (Bezet) 462 ms    Calculated P Axis 44 degrees    Calculated R Axis 18 degrees    Calculated T Axis 9 degrees    Diagnosis       !! AGE AND GENDER SPECIFIC ECG ANALYSIS !! Normal sinus rhythm  RSR' or QR pattern in V1 suggests right ventricular conduction delay  Nonspecific ST abnormality  When compared with ECG of 02-SEP-2013 20:02,  No significant change was found  Confirmed by [facility]  MD (), REINA FRANCIS ([id number]) on 7/20/2020 2:02:03 PM     CBC WITH AUTOMATED DIFF    Collection Time: 07/20/20  7:47 AM   Result Value Ref Range    WBC 19.0 (H) 4.3 - 11.1 K/uL    RBC 4.43 4.05 - 5.2 M/uL    HGB 12.8 11.7 - 15.4 g/dL    HCT 39.6 35.8 - 46.3 %    MCV 89.4 79.6 - 97.8 FL    MCH 28.9 26.1 - 32.9 PG    MCHC 32.3 31.4 - 35.0 g/dL    RDW 14.7 (H) 11.9 - 14.6 %    PLATELET 538 738 - 256 K/uL    MPV 12.3 9.4 - 12.3 FL    ABSOLUTE NRBC 0.00 0.0 - 0.2 K/uL    DF AUTOMATED      NEUTROPHILS 92 (H) 43 - 78 %    LYMPHOCYTES 3 (L) 13 - 44 %    MONOCYTES 4 4.0 - 12.0 %    EOSINOPHILS 0 (L) 0.5 - 7.8 %    BASOPHILS 0 0.0 - 2.0 %    IMMATURE GRANULOCYTES 1 0.0 - 5.0 %    ABS. NEUTROPHILS 17.5 (H) 1.7 - 8.2 K/UL    ABS. LYMPHOCYTES 0.5 0.5 - 4.6 K/UL    ABS. MONOCYTES 0.8 0.1 - 1.3 K/UL    ABS. EOSINOPHILS 0.0 0.0 - 0.8 K/UL    ABS. BASOPHILS 0.0 0.0 - 0.2 K/UL    ABS. IMM.  GRANS. 0.2 0.0 - 0.5 K/UL   METABOLIC PANEL, COMPREHENSIVE    Collection Time: 07/20/20  7:47 AM   Result Value Ref Range    Sodium 137 136 - 145 mmol/L    Potassium 3.0 (L) 3.5 - 5.1 mmol/L    Chloride 104 98 - 107 mmol/L CO2 25 21 - 32 mmol/L    Anion gap 8 7 - 16 mmol/L    Glucose 122 (H) 65 - 100 mg/dL    BUN 14 6 - 23 MG/DL    Creatinine 0.82 0.6 - 1.0 MG/DL    GFR est AA >60 >60 ml/min/1.73m2    GFR est non-AA >60 >60 ml/min/1.73m2    Calcium 8.7 8.3 - 10.4 MG/DL    Bilirubin, total 0.8 0.2 - 1.1 MG/DL    ALT (SGPT) 50 12 - 65 U/L    AST (SGOT) 44 (H) 15 - 37 U/L    Alk. phosphatase 121 50 - 136 U/L    Protein, total 7.7 6.3 - 8.2 g/dL    Albumin 2.9 (L) 3.5 - 5.0 g/dL    Globulin 4.8 (H) 2.3 - 3.5 g/dL    A-G Ratio 0.6 (L) 1.2 - 3.5     TROPONIN-HIGH SENSITIVITY    Collection Time: 07/20/20  7:47 AM   Result Value Ref Range    Troponin-High Sensitivity 5.2 0 - 14 pg/mL   HEMOGLOBIN A1C WITH EAG    Collection Time: 07/20/20  7:47 AM   Result Value Ref Range    Hemoglobin A1c 6.2 (H) 4.8 - 6.0 %    Est. average glucose 131 mg/dL   MAGNESIUM    Collection Time: 07/20/20  7:47 AM   Result Value Ref Range    Magnesium 1.8 1.8 - 2.4 mg/dL   URINE MICROSCOPIC    Collection Time: 07/20/20  2:14 PM   Result Value Ref Range    WBC 0-3 0 /hpf    RBC 5-10 0 /hpf    Epithelial cells 0-3 0 /hpf    Bacteria 0 0 /hpf    Casts 5-10 0 /lpf       All Micro Results     Procedure Component Value Units Date/Time    CULTURE, BLOOD [[id number]]     Order Status:  Sent Specimen:  Blood     CULTURE, BLOOD [[id number]]     Order Status:  Sent Specimen:  Blood           Other Studies:  Xr Chest Pa Lat    Result Date: 7/20/2020  CHEST X-RAY, 2 views. HISTORY:  Upper back pain. TECHNIQUE: PA and lateral views. COMPARISON: None. FINDINGS: Patient is rotated rightward slightly. -Lungs: are clear. -Costophrenic angles: Minimally blunted. There is a small amount of fluid in the minor fissure. -Heart size: is normal. Questionable widening of the upper mediastinum. -Pulmonary vasculature: is unremarkable. -Included portion of the upper abdomen: is unremarkable. -Bones: No gross bony lesions. -Other: None.      IMPRESSION: Questionable widening upper mediastinum although this may be due to rotation. CT should be considered if clinically indicated. Ct Chest Abd Pelv W Wo Cont    Result Date: 7/20/2020  CT OF THE CHEST ABDOMEN AND PELVIS INDICATION: Right upper quadrant pain and pain between shoulder blades and mass on abdominal ultrasound. TECHNIQUE:  Multiple axial images were obtained through the chest, abdomen and pelvis. Oral contrast was used for bowel opacification. 100mL of Isovue 370 intravenous contrast was used for better evaluation of solid organs and vascular structures. Radiation dose reduction techniques were used for this study. All CT scans performed at this facility use one or all of the following: Automated exposure control, adjustment of the mA and/or kVp according to patient's size, iterative reconstruction. COMPARISON: None FINDINGS: -LUNGS: Small right pleural effusion. Some bands of atelectasis right lung. -MEDIASTINUM/AXILLA: Unremarkable. No adenopathy. -HEART/VESSELS: Great vessels unremarkable opacified. -CHEST WALL/BONES: Thoracic spine demonstrates degenerative changes. Mildly prominent soft tissue and fat attenuation just anterior to the T6 and T7 bodies. Underlying bone demonstrates significant spondylosis and osteophytes. -LIVER: Small cyst right lobe. -GALLBLADDER/BILE DUCTS: No gallstones or bile duct dilation. -PANCREAS: Unremarkable. -SPLEEN: Normal size and uniform -ADRENALS: A 5.5 cm right adrenal mass measures 10 Hounsfield units consistent with fat and benignity. -KIDNEYS/URETERS: Small cyst right kidney. Kidneys enhance symmetrically. No hydronephrosis. No radiopaque calculi. -BLADDER: Normal. -REPRODUCTIVE ORGANS: Uterus and adnexa unremarkable. -BOWEL: Normal caliber. No inflammatory changes. -LYMPH NODES: No significant retroperitoneal, mesenteric, or pelvic adenopathy. -BONES: No fracture or significant bone lesion.  Degenerative changes lumbar spine with spinal stenosis at L2-3 L3-4 L4-5 and L5-S1. -VASCULATURE: Normal -OTHER: No ascites. This case was reviewed in consultation with colleagues. IMPRESSION: 1) Trace right pleural fusion. 2) Benign fat-containing 5.5 cm right adrenal adenoma. Us [facility]    Result Date: 7/20/2020  RIGHT UPPER QUADRANT ULTRASOUND. HISTORY: Right upper quadrant abdominal pain and shoulder pain. Some nausea. COMPARISON: No relevant comparison studies available. FINDINGS: Ultrasonographic Whyte's sign: is reported as positive. Gallstones: No . Gallbladder Wall: not thickened. Common Bile Duct: is not dilated, 1-2 mm. Intrahepatic Biliary Tree: is not dilated. Liver: Uniform parenchyma Included portion of the pancreas and right kidney: are unremarkable. There is a 3.0 x 4.9 cm mass, nearly isoechoic to liver adjacent to the kidney. IMPRESSION: Negative for gallstones or biliary tree obstruction. Patient was tender over the gallbladder. There is a 4.9 cm mass in the right upper quadrant, may be unusual. Coarsened liver with possible adrenal mass. CT scan or MRI would better evaluate this.        Assessment and Plan:     Hospital Problems as of 7/20/2020 Never Reviewed          Codes Class Noted - Resolved POA    Hypokalemia ICD-10-CM: E87.6  ICD-9-CM: 276.8  7/20/2020 - Present Unknown        HTN (hypertension), malignant ICD-10-CM: I10  ICD-9-CM: 401.0  7/20/2020 - Present Unknown        Arthritis ICD-10-CM: M19.90  ICD-9-CM: 716.90  7/20/2020 - Present Unknown        Morbidly obese [facility] ICD-10-CM: E66.01  ICD-9-CM: 278.01  7/20/2020 - Present Unknown        Adrenal adenoma, right ICD-10-CM: D35.01  ICD-9-CM: 227.0  7/20/2020 - Present Unknown    Overview Signed 7/20/2020  2:53 PM by Gage Lange MD     Benign fat-containing 5.5 cm right adrenal adenoma             SIRS (systemic inflammatory response syndrome) ([facility] ICD-10-CM: R65.10  ICD-9-CM: 995.90  7/20/2020 - Present Unknown              PLAN:  -SIRS- will add rocephin  - Malignant htn- cont cardenen drip, add po hydralazine and imdur  - chronic arthritis/sciatica/spine problems  - hypokalemia - replace  - rt adrenal adenoma    Full code    DVT ppx:  heparin  Anticipated DC needs:    Code status:  Full  Estimated LOS:  Greater than 2 midnights  Risk:  high    Signed:  Petrona Miguel MD

## 2020-07-20 NOTE — ED NOTES
[facility]  Transfer of care at this time

## 2020-07-20 NOTE — ED TRIAGE NOTES
Pt BIB EMS for stabbing pain to bilateral shoulder blades for 3 days. States that she has history of low back pain, but this is different. Reports that she had numbness and burning to her \"butt\". HTN in triage, states that she has doesn't have the money to fill her prescriptions.

## 2020-07-20 NOTE — ED PROVIDER NOTES
Patient is a morbidly obese 53-year-old female presenting to the emergency department today complaining of pain behind her right shoulder blade which started 3 days ago. Patient states that she has had episodes of nausea and vomiting. She denies radiation of that pain anywhere. After several episodes of vomiting she started having pain behind the left shoulder blade as well. The patient says that her history of sciatica is flared up and she is having pain in her buttocks radiating down into her legs bilaterally. The patient has not had any urinary or fecal incontinence. The patient says she does have a history of some stress incontinence after having a 10 pound baby but that is been an ongoing issue. The patient has a history of high blood pressure and has been out of her blood pressure medication for at least 1 week. She denies any chest pain or shortness of breath. Patient works at [employer] and has not been exposed to anyone with coronavirus as far she is aware. Past Medical History:   Diagnosis Date    Hypertension        Past Surgical History:   Procedure Laterality Date    HX ORTHOPAEDIC           History reviewed. No pertinent family history.     Social History     Socioeconomic History    Marital status: SINGLE     Spouse name: Not on file    Number of children: Not on file    Years of education: Not on file    Highest education level: Not on file   Occupational History    Not on file   Social Needs    Financial resource strain: Not on file    Food insecurity     Worry: Not on file     Inability: Not on file    Transportation needs     Medical: Not on file     Non-medical: Not on file   Tobacco Use    Smoking status: Never Smoker   Substance and Sexual Activity    Alcohol use: No    Drug use: No    Sexual activity: Not on file   Lifestyle    Physical activity     Days per week: Not on file     Minutes per session: Not on file    Stress: Not on file   Relationships    Social connections     Talks on phone: Not on file     Gets together: Not on file     Attends Christianity service: Not on file     Active member of club or organization: Not on file     Attends meetings of clubs or organizations: Not on file     Relationship status: Not on file    Intimate partner violence     Fear of current or ex partner: Not on file     Emotionally abused: Not on file     Physically abused: Not on file     Forced sexual activity: Not on file   Other Topics Concern    Not on file   Social History Narrative    Not on file         ALLERGIES: Lortab [hydrocodone-acetaminophen]    Review of Systems   Constitutional: Negative. HENT: Negative. Eyes: Negative. Respiratory: Negative. Cardiovascular: Negative. Gastrointestinal: Positive for abdominal pain. Genitourinary: Negative. Musculoskeletal: Positive for back pain. Skin: Negative. Neurological: Negative. Hematological: Negative. Vitals:    07/20/20 1140 07/20/20 1148 07/20/20 1149 07/20/20 1150   BP: (!) 172/117      Pulse: 99      Resp:       Temp:       SpO2: 92% 93% 93% 93%   Weight:       Height:                Physical Exam     GENERAL:The patient is morbidly obese, and well-hydrated. VITAL SIGNS: Heart rate, blood pressure, respiratory rate reviewed as recorded in  nurse's notes  EYES: Pupils reactive. Extraocular motion intact. No conjunctival redness or drainage. NECK: Supple, no meningeal signs. Trachea midline. No masses or thyromegaly. LUNGS: Breath sounds clear and equal bilaterally no accessory muscle use  CARDIOVASCULAR: Regular rate and rhythm  ABDOMEN: Soft with right upper quadrant tenderness. No palpable masses or organomegaly. No  peritoneal signs. No rigidity. BACK: No step-off deformities. Positive piriformis sign bilaterally  EXTREMITIES: No clubbing or cyanosis. No joint swelling. Normal muscle tone. No  restricted range of motion appreciated. NEUROLOGIC: Sensation is grossly intact.  Cranial nerve exam reveals face is  symmetrical, tongue is midline speech is clear. SKIN: No rash or petechiae. Good skin turgor palpated. PSYCHIATRIC: Alert and oriented. Appropriate behavior and judgment. MDM  Number of Diagnoses or Management Options  Diagnosis management comments: Viral infection, gastroenteritis, viral adenitis, pseudomembranous colitis, inflammatory  bowel disease, infectious diarrhea    Abdominal wall pain,     Constipation, fecal impaction, small bowel obstruction, partial small bowel obstruction,  Ileus    UTI, pyelonephritis, renal colic, ureteral stone     Peptic ulcer disease, esophagitis, GERD    Pancreatitis, pancreatic pseudocyst,    hepatic cirrhosis, GI bleed, esophageal varices, poisoning,    gallbladder disease, cholecystitis, diverticulitis, appendicitis, appendicitis with rupture,    ingestion of foreign material         Amount and/or Complexity of Data Reviewed  Clinical lab tests: ordered and reviewed  Tests in the radiology section of CPT®: ordered and reviewed  Tests in the medicine section of CPT®: reviewed and ordered  Review and summarize past medical records: yes  Independent visualization of images, tracings, or specimens: yes      ED Course as of Jul 20 1332   Mon Jul 20, 2020   0818 WBC(!): 19.0 [KH]   0818 ABS. NEUTROPHILS(!): 17.5 [KH]   0949 The patient is starting to feel much better and her blood pressure is starting to decrease with the pain control. [KH]   1001 IMPRESSION: Questionable widening upper mediastinum although this may be due to  rotation. CT should be considered if clinically indicated. XR CHEST PA LAT [KH]   1056 I spoke with ultrasound regarding the significant delay and they will reach out to the radiologist to see about having the study evaluated    [KH]   1109 IMPRESSION: Negative for gallstones or biliary tree obstruction. Patient was  tender over the gallbladder.  There is a 4.9 cm mass in the right upper quadrant,  may be unusual. Coarsened liver with possible adrenal mass. CT scan or MRI would  better evaluate this. US [facility] [WG]   0107 Talk to the patient about the findings in the emergency department on the chest x-ray and abdominal ultrasound. Because of the abnormalities seen further testing will be done and she is agreeable with this plan. [CR]   6583 Patient continues to have uncontrolled hypertension after pain medicine x2 and blood pressure medication as well. The patient will be started on a Cardene drip secondary to her malignant hypertension with pain between her shoulder blades    [KH]      ED Course User Index  [KH] Marilynn Hayes DO       CRITICAL CARE (ASAP ONLY)  Performed by: Marilynn Hayes DO  Authorized by: Marilynn Hayes DO     Comments:      Critical care time: 99 minutes of critical care time was performed in the emergency department. This was separate from any other procedures listed during the patients emergency department course. The failure to initiate these interventions on an urgent basis would likely have resulted in sudden, clinically significant or life-threatening deterioration in the patients condition.

## 2020-07-21 PROBLEM — R78.81 BACTEREMIA: Status: ACTIVE | Noted: 2020-07-21

## 2020-07-21 LAB
ACC. NO. FROM MICRO ORDER, ACCP: ABNORMAL
ANION GAP SERPL CALC-SCNC: 9 MMOL/L (ref 7–16)
BASOPHILS # BLD: 0 K/UL (ref 0–0.2)
BASOPHILS NFR BLD: 0 % (ref 0–2)
BUN SERPL-MCNC: 19 MG/DL (ref 6–23)
CALCIUM SERPL-MCNC: 8.8 MG/DL (ref 8.3–10.4)
CHLORIDE SERPL-SCNC: 101 MMOL/L (ref 98–107)
CO2 SERPL-SCNC: 28 MMOL/L (ref 21–32)
CREAT SERPL-MCNC: 0.8 MG/DL (ref 0.6–1)
DIFFERENTIAL METHOD BLD: ABNORMAL
EOSINOPHIL # BLD: 0 K/UL (ref 0–0.8)
EOSINOPHIL NFR BLD: 0 % (ref 0.5–7.8)
ERYTHROCYTE [DISTWIDTH] IN BLOOD BY AUTOMATED COUNT: 14.9 % (ref 11.9–14.6)
GLUCOSE SERPL-MCNC: 128 MG/DL (ref 65–100)
HCT VFR BLD AUTO: 36.3 % (ref 35.8–46.3)
HGB BLD-MCNC: 11.6 G/DL (ref 11.7–15.4)
IMM GRANULOCYTES # BLD AUTO: 0.1 K/UL (ref 0–0.5)
IMM GRANULOCYTES NFR BLD AUTO: 1 % (ref 0–5)
INTERPRETATION: ABNORMAL
LYMPHOCYTES # BLD: 0.8 K/UL (ref 0.5–4.6)
LYMPHOCYTES NFR BLD: 5 % (ref 13–44)
MCH RBC QN AUTO: 28.9 PG (ref 26.1–32.9)
MCHC RBC AUTO-ENTMCNC: 32 G/DL (ref 31.4–35)
MCV RBC AUTO: 90.3 FL (ref 79.6–97.8)
MECA (METHICILLIN-RESISTANCE GENES), MRGP: NOT DETECTED
MONOCYTES # BLD: 1.3 K/UL (ref 0.1–1.3)
MONOCYTES NFR BLD: 8 % (ref 4–12)
NEUTS SEG # BLD: 14 K/UL (ref 1.7–8.2)
NEUTS SEG NFR BLD: 86 % (ref 43–78)
NRBC # BLD: 0 K/UL (ref 0–0.2)
PLATELET # BLD AUTO: 182 K/UL (ref 150–450)
PMV BLD AUTO: 12.8 FL (ref 9.4–12.3)
POTASSIUM SERPL-SCNC: 3.3 MMOL/L (ref 3.5–5.1)
RBC # BLD AUTO: 4.02 M/UL (ref 4.05–5.2)
SODIUM SERPL-SCNC: 138 MMOL/L (ref 136–145)
STAPHYLOCOCCUS AUREUS: DETECTED
STAPHYLOCOCCUS, STAPP: DETECTED
WBC # BLD AUTO: 16.2 K/UL (ref 4.3–11.1)

## 2020-07-21 PROCEDURE — 74011000258 HC RX REV CODE- 258: Performed by: FAMILY MEDICINE

## 2020-07-21 PROCEDURE — 74011250637 HC RX REV CODE- 250/637: Performed by: INTERNAL MEDICINE

## 2020-07-21 PROCEDURE — 65660000000 HC RM CCU STEPDOWN

## 2020-07-21 PROCEDURE — 74011000250 HC RX REV CODE- 250: Performed by: INTERNAL MEDICINE

## 2020-07-21 PROCEDURE — 77030040361 HC SLV COMPR DVT MDII -B

## 2020-07-21 PROCEDURE — 74011250636 HC RX REV CODE- 250/636: Performed by: INTERNAL MEDICINE

## 2020-07-21 PROCEDURE — 36415 COLL VENOUS BLD VENIPUNCTURE: CPT

## 2020-07-21 PROCEDURE — 80048 BASIC METABOLIC PNL TOTAL CA: CPT

## 2020-07-21 PROCEDURE — 74011250637 HC RX REV CODE- 250/637: Performed by: FAMILY MEDICINE

## 2020-07-21 PROCEDURE — C8929 TTE W OR WO FOL WCON,DOPPLER: HCPCS

## 2020-07-21 PROCEDURE — 74011250636 HC RX REV CODE- 250/636: Performed by: FAMILY MEDICINE

## 2020-07-21 PROCEDURE — 65270000029 HC RM PRIVATE

## 2020-07-21 PROCEDURE — 85025 COMPLETE CBC W/AUTO DIFF WBC: CPT

## 2020-07-21 RX ORDER — POTASSIUM CHLORIDE 20 MEQ/1
20 TABLET, EXTENDED RELEASE ORAL DAILY
Status: DISCONTINUED | OUTPATIENT
Start: 2020-07-22 | End: 2020-07-28 | Stop reason: HOSPADM

## 2020-07-21 RX ORDER — VANCOMYCIN HYDROCHLORIDE
1250 EVERY 12 HOURS
Status: DISCONTINUED | OUTPATIENT
Start: 2020-07-21 | End: 2020-07-22

## 2020-07-21 RX ORDER — MAG HYDROX/ALUMINUM HYD/SIMETH 200-200-20
30 SUSPENSION, ORAL (FINAL DOSE FORM) ORAL
Status: DISCONTINUED | OUTPATIENT
Start: 2020-07-21 | End: 2020-07-28 | Stop reason: HOSPADM

## 2020-07-21 RX ORDER — LANOLIN ALCOHOL/MO/W.PET/CERES
400 CREAM (GRAM) TOPICAL 2 TIMES DAILY
Status: DISCONTINUED | OUTPATIENT
Start: 2020-07-21 | End: 2020-07-21

## 2020-07-21 RX ORDER — HYDRALAZINE HYDROCHLORIDE 10 MG/1
10 TABLET, FILM COATED ORAL
Status: DISCONTINUED | OUTPATIENT
Start: 2020-07-21 | End: 2020-07-28 | Stop reason: HOSPADM

## 2020-07-21 RX ORDER — POTASSIUM CHLORIDE 20 MEQ/1
40 TABLET, EXTENDED RELEASE ORAL
Status: COMPLETED | OUTPATIENT
Start: 2020-07-21 | End: 2020-07-21

## 2020-07-21 RX ORDER — POTASSIUM CHLORIDE 20 MEQ/1
40 TABLET, EXTENDED RELEASE ORAL EVERY 4 HOURS
Status: DISCONTINUED | OUTPATIENT
Start: 2020-07-21 | End: 2020-07-21

## 2020-07-21 RX ADMIN — OXYCODONE HYDROCHLORIDE AND ACETAMINOPHEN 1 TABLET: 5; 325 TABLET ORAL at 03:07

## 2020-07-21 RX ADMIN — LISINOPRIL AND HYDROCHLOROTHIAZIDE 1 TABLET: 25; 20 TABLET ORAL at 08:28

## 2020-07-21 RX ADMIN — ONDANSETRON 4 MG: 2 INJECTION INTRAMUSCULAR; INTRAVENOUS at 08:34

## 2020-07-21 RX ADMIN — VANCOMYCIN HYDROCHLORIDE 2500 MG: 10 INJECTION, POWDER, LYOPHILIZED, FOR SOLUTION INTRAVENOUS at 11:39

## 2020-07-21 RX ADMIN — HEPARIN SODIUM 5000 UNITS: 5000 INJECTION INTRAVENOUS; SUBCUTANEOUS at 01:20

## 2020-07-21 RX ADMIN — MAGNESIUM GLUCONATE 500 MG ORAL TABLET 400 MG: 500 TABLET ORAL at 17:10

## 2020-07-21 RX ADMIN — MAGNESIUM GLUCONATE 500 MG ORAL TABLET 400 MG: 500 TABLET ORAL at 08:27

## 2020-07-21 RX ADMIN — HYDRALAZINE HYDROCHLORIDE 50 MG: 50 TABLET, FILM COATED ORAL at 08:27

## 2020-07-21 RX ADMIN — Medication 10 ML: at 06:12

## 2020-07-21 RX ADMIN — Medication 10 ML: at 17:11

## 2020-07-21 RX ADMIN — ONDANSETRON 4 MG: 2 INJECTION INTRAMUSCULAR; INTRAVENOUS at 17:10

## 2020-07-21 RX ADMIN — PERFLUTREN 1 ML: 6.52 INJECTION, SUSPENSION INTRAVENOUS at 08:00

## 2020-07-21 RX ADMIN — ALUMINUM HYDROXIDE, MAGNESIUM HYDROXIDE, AND SIMETHICONE 30 ML: 200; 200; 20 SUSPENSION ORAL at 17:10

## 2020-07-21 RX ADMIN — OXYCODONE HYDROCHLORIDE AND ACETAMINOPHEN 1 TABLET: 5; 325 TABLET ORAL at 17:10

## 2020-07-21 RX ADMIN — OXYCODONE HYDROCHLORIDE AND ACETAMINOPHEN 1 TABLET: 5; 325 TABLET ORAL at 22:16

## 2020-07-21 RX ADMIN — ISOSORBIDE MONONITRATE 30 MG: 30 TABLET, EXTENDED RELEASE ORAL at 08:27

## 2020-07-21 RX ADMIN — OXYCODONE HYDROCHLORIDE AND ACETAMINOPHEN 1 TABLET: 5; 325 TABLET ORAL at 11:39

## 2020-07-21 RX ADMIN — POTASSIUM CHLORIDE 40 MEQ: 20 TABLET, EXTENDED RELEASE ORAL at 11:39

## 2020-07-21 RX ADMIN — OXYCODONE HYDROCHLORIDE AND ACETAMINOPHEN 1 TABLET: 5; 325 TABLET ORAL at 08:27

## 2020-07-21 RX ADMIN — Medication 10 ML: at 22:22

## 2020-07-21 RX ADMIN — VANCOMYCIN HYDROCHLORIDE 1250 MG: 10 INJECTION, POWDER, LYOPHILIZED, FOR SOLUTION INTRAVENOUS at 22:17

## 2020-07-21 RX ADMIN — CEFTRIAXONE SODIUM 1 G: 1 INJECTION, POWDER, FOR SOLUTION INTRAMUSCULAR; INTRAVENOUS at 17:10

## 2020-07-21 NOTE — PROGRESS NOTES
VPMHMXIQ18:51 PM      Hospitalist Progress Note     Admit Date:  2020  7:36 AM   Name:  Diya Ambriz   Age:  64 y.o.  :  1960   MRN:  [id number]   PCP:  Phillip Neri MD  Treatment Team: Attending Provider: Emelina Dockery DO; Primary Nurse: Forest Jama RN; Care Manager: Humberto Farmer RN; Utilization Review: Minerva Litten, RN    Subjective:   HPI and or CC:  From history and physical HPI:  64 yr old  female patient who follows up with [facility], off her blood pressure medication for past 1 week. Known history of hypertension, morbid obesity, arthritis, sciatica and spine issues.     Pt says she is off blood pressure meds for a week. Since past 4 days started having sharp pain rt shoulder blade , then both shoulder blade region, constant, moderate intensity, not improving even after several doses of nsaid. Says also has sciatica pain more on the rt lower extremity. Started having vomiting, non bilious , about 7 episodes since last night. Dizziness, on and off since past 4 days ,since her blood pressures have been up.     Pt otherwise doesn't c/o head or shortness of breath or any left sided chest pain.     Wbc 19,k 3, bp 219/117  CT OF THE CHEST ABDOMEN AND PELVIS  1) Trace right pleural fusion. 2) Benign fat-containing 5.5 cm right adrenal adenoma.     Pt presently on cardene drip for elevated bp.     Pt will be admitted to ICU for malignant htn, hypokalemia and does meet the criteria for SIRS    2020:  Chief complaint malaiseanxiety regarding recent scapular pain now resolved    Discussed work-up regarding sirs criteria. Leukocytosis noted but heart rate respiratory rate temperature okay. Changed back to lisinopril HCTZ today and hydralazine lower dose and as needed only. Will need fixed dose potassium and magnesium if using HCTZ at discharge.   Expect discharge next 24 hours if leukocytosis continues to improve and no evidence of significant infection and blood pressure remains fairly stable. Check troponin I regarding scapular pain. She appears to understand.   Will need outpatient follow-up imaging regarding adrenal adenoma and possible endocrine referral.          Objective:     Patient Vitals for the past 24 hrs:   Temp Pulse Resp BP SpO2   07/21/20 0952 98.1 °F (36.7 °C) 100 20 140/79 92 %   07/21/20 0539 97.5 °F (36.4 °C) 86 20 115/68 93 %   07/21/20 0052 97.6 °F (36.4 °C) 98 20 120/81 93 %   07/20/20 2245 98.9 °F (37.2 °C) (!) 110 20 126/81 93 %   07/20/20 2200 98.5 °F (36.9 °C) (!) 104 24 129/73 93 %   07/20/20 2131  (!) 105 30 127/75 95 %   07/20/20 2100  (!) 106  130/82 93 %   07/20/20 2031  (!) 111 10 116/75 94 %   07/20/20 2008  (!) 115  126/73    07/20/20 2001  (!) 118  126/73 94 %   07/20/20 1944  (!) 116  152/75 93 %   07/20/20 1933  (!) 114  152/75    07/20/20 1927     94 %   07/20/20 1927  (!) 116 (!) 31 142/74 94 %   07/20/20 1901  (!) 116  137/77 93 %   07/20/20 1829  (!) 123  151/77 93 %   07/20/20 1817  (!) 124  156/70    07/20/20 1728  (!) 128  156/70    07/20/20 1724  (!) 132 26 156/70 91 %   07/20/20 1714  (!) 135 (!) 33 161/72 93 %   07/20/20 1654  (!) 139 (!) 47 171/71 92 %   07/20/20 1634  (!) 141 (!) 44 185/85 90 %   07/20/20 1624  (!) 136 (!) 38 (!) 182/94 92 %   07/20/20 1604  (!) 135 (!) 33 (!) 199/92 93 %   07/20/20 1544  (!) 131 (!) 47 (!) 170/95 92 %   07/20/20 1514  (!) 146  (!) 233/116    07/20/20 1439  (!) 113  (!) 208/103    07/20/20 1423  (!) 106  (!) 222/111    07/20/20 1401  (!) 107  (!) 219/117    07/20/20 1150     93 %   07/20/20 1149     93 %   07/20/20 1148     93 %   07/20/20 1140  99  (!) 172/117 92 %   07/20/20 1135  96      07/20/20 1134  94      07/20/20 1133  95      07/20/20 1132  96      07/20/20 1131  97      07/20/20 1123  (!) 101      07/20/20 1107  87  (!) 215/105    07/20/20 1034  (!) 104  (!) 199/95      Oxygen Therapy  O2 Sat (%): 92 % (07/21/20 0952)  Pulse via Oximetry: 106 beats per minute (07/20/20 2200)  O2 Device: Nasal cannula (07/21/20 0952)  O2 Flow Rate (L/min): 1 l/min (07/21/20 0952)    Intake/Output Summary (Last 24 hours) at 7/21/2020 1010  Last data filed at 7/21/2020 0952  Gross per 24 hour   Intake 420 ml   Output 250 ml   Net 170 ml         REVIEW OF SYSTEMS: Comprehensive ROS performed and negative except as stated in HPI. Physical Examination:  General:    Well nourished. No gross distress. Head:  Normocephalic, atraumatic, nares patent  Eyes:  Extraocular movements intact, normal sclera  CV:   RRR. No  Murmurs, clicks, or gallops, distal pulses intact  Lungs:   Unlabored, no cyanosis, no wheeze  Abdomen:   Soft, nondistended, nontender. Extremities: Warm and dry. No cyanosis or edema. Skin:     No rashes or jaundice. Neuro:  No gross focal deficits, no tremor  Psych:  Mood and affect appropriate    Data Review:  I have reviewed all labs, meds, telemetry events, and studies from the last 24 hours.     Recent Results (from the past 24 hour(s))   URINE MICROSCOPIC    Collection Time: 07/20/20  2:14 PM   Result Value Ref Range    WBC 0-3 0 /hpf    RBC 5-10 0 /hpf    Epithelial cells 0-3 0 /hpf    Bacteria 0 0 /hpf    Casts 5-10 0 /lpf   CULTURE, BLOOD    Collection Time: 07/20/20  5:40 PM    Specimen: Blood   Result Value Ref Range    Special Requests: LEFT  HAND        GRAM STAIN GRAM POS COCCI IN CLUSTERS      GRAM STAIN AEROBIC AND ANAEROBIC BOTTLES      GRAM STAIN        RESULTS VERIFIED, PHONED TO AND READ BACK BY NADIA Her RN ON 7.21.2020 AT 0957,      Culture result: CULTURE IN [address] UPDATES TO FOLLOW      Culture result: REFER TO [facility] ACCESSION [id number],     LACTIC ACID    Collection Time: 07/20/20  5:40 PM   Result Value Ref Range    Lactic acid 0.9 0.4 - 2.0 MMOL/L   CULTURE, BLOOD    Collection Time: 07/20/20  6:28 PM Specimen: Blood   Result Value Ref Range    Special Requests: RIGHT  HAND        GRAM STAIN GRAM POS COCCI IN CLUSTERS      GRAM STAIN ANAEROBIC BOTTLE POSITIVE      GRAM STAIN        RESULTS VERIFIED, PHONED TO AND READ BACK BY NADIA Godwin RN ON 7.21.2020 AT 0957,      Culture result: CULTURE IN [facility] UPDATES TO FOLLOW     METABOLIC PANEL, BASIC    Collection Time: 07/21/20  4:53 AM   Result Value Ref Range    Sodium 138 136 - 145 mmol/L    Potassium 3.3 (L) 3.5 - 5.1 mmol/L    Chloride 101 98 - 107 mmol/L    CO2 28 21 - 32 mmol/L    Anion gap 9 7 - 16 mmol/L    Glucose 128 (H) 65 - 100 mg/dL    BUN 19 6 - 23 MG/DL    Creatinine 0.80 0.6 - 1.0 MG/DL    GFR est AA >60 >60 ml/min/1.73m2    GFR est non-AA >60 >60 ml/min/1.73m2    Calcium 8.8 8.3 - 10.4 MG/DL   CBC WITH AUTOMATED DIFF    Collection Time: 07/21/20  4:53 AM   Result Value Ref Range    WBC 16.2 (H) 4.3 - 11.1 K/uL    RBC 4.02 (L) 4.05 - 5.2 M/uL    HGB 11.6 (L) 11.7 - 15.4 g/dL    HCT 36.3 35.8 - 46.3 %    MCV 90.3 79.6 - 97.8 FL    MCH 28.9 26.1 - 32.9 PG    MCHC 32.0 31.4 - 35.0 g/dL    RDW 14.9 (H) 11.9 - 14.6 %    PLATELET 984 593 - 135 K/uL    MPV 12.8 (H) 9.4 - 12.3 FL    ABSOLUTE NRBC 0.00 0.0 - 0.2 K/uL    DF AUTOMATED      NEUTROPHILS 86 (H) 43 - 78 %    LYMPHOCYTES 5 (L) 13 - 44 %    MONOCYTES 8 4.0 - 12.0 %    EOSINOPHILS 0 (L) 0.5 - 7.8 %    BASOPHILS 0 0.0 - 2.0 %    IMMATURE GRANULOCYTES 1 0.0 - 5.0 %    ABS. NEUTROPHILS 14.0 (H) 1.7 - 8.2 K/UL    ABS. LYMPHOCYTES 0.8 0.5 - 4.6 K/UL    ABS. MONOCYTES 1.3 0.1 - 1.3 K/UL    ABS. EOSINOPHILS 0.0 0.0 - 0.8 K/UL    ABS. BASOPHILS 0.0 0.0 - 0.2 K/UL    ABS. IMM.  GRANS. 0.1 0.0 - 0.5 K/UL        All Micro Results     Procedure Component Value Units Date/Time    CULTURE, BLOOD [[id number]] Collected:  07/20/20 1828    Order Status:  Completed Specimen:  Blood Updated:  07/21/20 1003     Special Requests: --        RIGHT  HAND       GRAM STAIN       GRAM POS COCCI IN CLUSTERS ANAEROBIC BOTTLE POSITIVE               RESULTS VERIFIED, PHONED TO AND READ BACK BY NADIA Her RN ON 7.21.2020 AT 0957,             Culture result:       CULTURE IN [address] [facility] UPDATES TO FOLLOW          CULTURE, BLOOD [[id number]] Collected:  07/20/20 1740    Order Status:  Completed Specimen:  Blood Updated:  07/21/20 1001     Special Requests: --        LEFT  HAND       GRAM STAIN       GRAM POS COCCI IN CLUSTERS                  AEROBIC AND ANAEROBIC BOTTLES                  RESULTS VERIFIED, PHONED TO AND READ BACK BY NADIA Her RN ON 7.21.2020 AT 0957,             Culture result:       CULTURE IN [address] [facility] UPDATES TO FOLLOW            REFER TO [employer] ACCESSION [id number],     BLOOD CULTURE ID PANEL [[id number]] Collected:  07/20/20 1740    Order Status:  Completed Updated:  07/21/20 0810          Current Meds:  Current Facility-Administered Medications   Medication Dose Route Frequency    perflutren lipid microspheres (DEFINITY) in NS bolus IV  1 mL IntraVENous PRN    hydrALAZINE (APRESOLINE) tablet 10 mg  10 mg Oral Q6H PRN    [START ON 7/22/2020] potassium chloride (K-DUR, KLOR-CON) SR tablet 20 mEq  20 mEq Oral DAILY    potassium chloride (K-DUR, KLOR-CON) SR tablet 40 mEq  40 mEq Oral NOW    lisinopril-hydroCHLOROthiazide (PRINZIDE, ZESTORETIC) 20-25 mg per tablet 1 Tab  1 Tab Oral DAILY    sodium chloride (NS) flush 5-40 mL  5-40 mL IntraVENous Q8H    sodium chloride (NS) flush 5-40 mL  5-40 mL IntraVENous PRN    acetaminophen (TYLENOL) tablet 650 mg  650 mg Oral Q4H PRN    oxyCODONE-acetaminophen (PERCOCET) 5-325 mg per tablet 1 Tab  1 Tab Oral Q4H PRN    morphine injection 1 mg  1 mg IntraVENous Q4H PRN    naloxone (NARCAN) injection 0.4 mg  0.4 mg IntraVENous PRN    diphenhydrAMINE (BENADRYL) injection 12.5 mg  12.5 mg IntraVENous Q4H PRN    ondansetron (ZOFRAN) injection 4 mg  4 mg IntraVENous Q4H PRN    bisacodyL (DULCOLAX) tablet 5 mg  5 mg Oral DAILY PRN    cefTRIAXone (ROCEPHIN) 1 g in 0.9% sodium chloride (MBP/ADV) 50 mL  1 g IntraVENous Q24H    magnesium oxide (MAG-OX) tablet 400 mg  400 mg Oral BID       Diet:  DIET CARDIAC    Other Studies (last 24 hours):  Ct Chest Abd Pelv W Wo Cont    Result Date: 7/20/2020  CT OF THE CHEST ABDOMEN AND PELVIS INDICATION: Right upper quadrant pain and pain between shoulder blades and mass on abdominal ultrasound. TECHNIQUE:  Multiple axial images were obtained through the chest, abdomen and pelvis. Oral contrast was used for bowel opacification. 100mL of Isovue 370 intravenous contrast was used for better evaluation of solid organs and vascular structures. Radiation dose reduction techniques were used for this study. All CT scans performed at this facility use one or all of the following: Automated exposure control, adjustment of the mA and/or kVp according to patient's size, iterative reconstruction. COMPARISON: None FINDINGS: -LUNGS: Small right pleural effusion. Some bands of atelectasis right lung. -MEDIASTINUM/AXILLA: Unremarkable. No adenopathy. -HEART/VESSELS: Great vessels unremarkable opacified. -CHEST WALL/BONES: Thoracic spine demonstrates degenerative changes. Mildly prominent soft tissue and fat attenuation just anterior to the T6 and T7 bodies. Underlying bone demonstrates significant spondylosis and osteophytes. -LIVER: Small cyst right lobe. -GALLBLADDER/BILE DUCTS: No gallstones or bile duct dilation. -PANCREAS: Unremarkable. -SPLEEN: Normal size and uniform -ADRENALS: A 5.5 cm right adrenal mass measures 10 Hounsfield units consistent with fat and benignity. -KIDNEYS/URETERS: Small cyst right kidney. Kidneys enhance symmetrically. No hydronephrosis. No radiopaque calculi. -BLADDER: Normal. -REPRODUCTIVE ORGANS: Uterus and adnexa unremarkable. -BOWEL: Normal caliber. No inflammatory changes. -LYMPH NODES: No significant retroperitoneal, mesenteric, or pelvic adenopathy.  -BONES: No fracture or significant bone lesion. Degenerative changes lumbar spine with spinal stenosis at L2-3 L3-4 L4-5 and L5-S1. -VASCULATURE: Normal -OTHER: No ascites. This case was reviewed in consultation with colleagues. IMPRESSION: 1) Trace right pleural fusion. 2) Benign fat-containing 5.5 cm right adrenal adenoma. Assessment and Plan:     Hospital Problems as of 7/21/2020 Never Reviewed          Codes Class Noted - Resolved POA    Hypokalemia ICD-10-CM: E87.6  ICD-9-CM: 276.8  7/20/2020 - Present Unknown        * (Principal) HTN (hypertension), malignant ICD-10-CM: I10  ICD-9-CM: 401.0  7/20/2020 - Present Unknown        Arthritis ICD-10-CM: M19.90  ICD-9-CM: 716.90  7/20/2020 - Present Unknown        Morbidly obese ([facility] [address] ICD-10-CM: E66.01  ICD-9-CM: 278.01  7/20/2020 - Present Unknown        Adrenal adenoma, right ICD-10-CM: D35.01  ICD-9-CM: 227.0  7/20/2020 - Present Unknown    Overview Signed 7/20/2020  2:53 PM by Raymon Marcelo MD     Benign fat-containing 5.5 cm right adrenal adenoma             SIRS (systemic inflammatory response syndrome) ([facility] ICD-10-CM: R65.10  ICD-9-CM: 995.90  7/20/2020 - Present Unknown              A/P:    --- Malignant hypertension-- improved. Monitor with lisinopril HCTZ only and as needed hydralazine next 24 hours. Fixed dose potassium regarding diuretic. Magnesium supplement.    -Adrenal adenoma-Outpatient follow-up imagingbenign appearing comment regarding fat-containing    Scapular pain very likely musculoskeletal related to spasm--/increase stress hormone related to uncontrolled hypertensionwe will check troponin I. Expect possible elevation regarding recent strain due to hypertension and may need repeat EKG. --Sirsempiric Rocephin addedcultures pending. Leukocytosis improving. Unclear of recency of recent oral steroids.     Addendum10:24 AM both blood culture bottles positive for gram-positive cocci staph aureusno mention regarding sensitivitiesconsult pharmacy for empiric vancomycin dosing await sensitivities. DC planning/Dispo: Home likely next 24 hours  DVT ppx: Subcutaneous heparin    Code status: Full code  Medical decision maker: Selffamily contacts on file      Signed:  Jesus Alberto Bellamy. Rainer BISHOP

## 2020-07-21 NOTE — ROUTINE PROCESS
Bedside and Verbal shift change report given to Araina Irving RN  (oncoming nurse). Report included the following information SBAR, Kardex, ED Summary, [facility] and Recent Results.

## 2020-07-21 NOTE — PROGRESS NOTES
Pharmacokinetic Consult to Pharmacist    Lizzeth Mercado is a 64 y.o. female being treated with Vancomycin 1250mg IV q12h. Height: 5' 4\" (162.6 cm)  Weight: 117.4 kg (258 lb 14.4 oz)  Lab Results   Component Value Date/Time    BUN 19 07/21/2020 04:53 AM    Creatinine 0.80 07/21/2020 04:53 AM    WBC 16.2 (H) 07/21/2020 04:53 AM    Lactic acid 0.9 07/20/2020 05:40 PM      Estimated Creatinine Clearance: 96.6 mL/min (based on SCr of 0.8 mg/dL). CULTURES:  BCx 2/2 (+) GPC    Day 1 of vancomycin. Goal trough is 15-20. Vancomycin dose initiated at 1250mg IV q12. Will continue to follow patient and order levels when clinically indicated.     Thank you,  Ever Cheung, PharmD  PGY1 Pharmacy Resident  [phone]    7/21/2020  10:52 AM

## 2020-07-21 NOTE — PROGRESS NOTES
Bedside and Verbal shift change report received from [facility]. Report included the following information SBAR, Kardex, ED Summary, [facility] and Recent Results.

## 2020-07-21 NOTE — ED NOTES
TRANSFER - OUT REPORT:    Verbal report given to Francine(name) on Jamaal Copper  being transferred to [facility](unit) for routine progression of care       Report consisted of patients Situation, Background, Assessment and   Recommendations(SBAR). Information from the following report(s) SBAR was reviewed with the receiving nurse. Lines:   Peripheral IV 07/20/20 Left Forearm (Active)       Peripheral IV 07/20/20 Right Hand (Active)        Opportunity for questions and clarification was provided.       Patient transported with:   Registered Nurse

## 2020-07-21 NOTE — PROGRESS NOTES
Patient continues to complain of shoulder and back pain (medication see MAR). Blood pressure stable. No BM during shift. Francie Eric in place with dark red urine. All hourly rounds performed. Call light within reach. No complaints at this time. Will continue with plan of care and give report to oncoming nurse.

## 2020-07-21 NOTE — PROGRESS NOTES
Care Management Interventions  PCP Verified by CM: Yes(Seen at [facility] 3 months ago)  Mode of Transport at Discharge: Other (see comment)(Rajan Nicholson  Other Relative  [phone]  )  Transition of Care Consult (CM Consult): Discharge Planning  Discharge Durable Medical Equipment: No  Physical Therapy Consult: No  Occupational Therapy Consult: No  Current Support Network: Other(Her 2 sons live with her in a house)  Confirm Follow Up Transport: Family  Discharge Location  Discharge Placement: Home    Pt admitted to 3rd floor [facility] for HTN, hypokalemia. CM met with pt to discuss CM needs & DCP. Pt is A&Ox4. Pt is indep at home with all ADLS. Pt's 2 sons live with her in her home. Pt states she is seen at [facility], most recently 3 months ago. . Pt has no DME needs. Pt states her knees are \"bone on bone\"  Pt has a cane. But awaiting consult when insurance straightened out. (Pt working FT, listed as PT at her work). Pt has no difficulty with obtaining medications through [facility]. DCP home with son's support. No further needs noted. CM to continue to monitor.

## 2020-07-21 NOTE — CDMP QUERY
Patient admitted with \"malignant hypertension\" and SIRS, bacteremia. Patient noted to have /116.  If possible, please document in progress notes and d/c summary if you are evaluating and /or treating any of the following:     Hypertensive Crisis   Hypertensive Emergency   Other, please specify   Clinically unable to determine    The medical record reflects the following:    Risk Factors: HTN, Obesity, Bacteremia    Clinical Indicators: /122    Treatment: Cardene gtt, Labetalol, Hydralazine    Thanks,  Christina Burris, RN, BSN, CDS  Clinical Documentation Improvement  [phone]

## 2020-07-21 NOTE — PROGRESS NOTES
Pt will small urine output that is increasingly red with angelic blood; pt receiving hep SQ for DVT prophylaxis. Dr. Cindi Thomson notified. Orders received to change DVT prophylaxis from Heparin SQ to SCDs.

## 2020-07-21 NOTE — PROGRESS NOTES
TRANSFER - IN REPORT:    Verbal report received from Sanjana Phelps RN (name) on Jamaal Don  being received from ED (unit) for routine progression of care      Report consisted of patients Situation, Background, Assessment and   Recommendations(SBAR). Information from the following report(s) SBAR, Kardex, ED Summary, MAR and Accordion was reviewed with the receiving nurse. Opportunity for questions and clarification was provided. Assessment completed upon patients arrival to unit and care assumed. Sacrum and bilateral heels and elbows intact. Excoriation under gluteal folds and pannus. Groin intact with no excoriation. Several scabbed areas on back and legs in which the patient reports picking herself. Scattered bruising on all extremities; largest bruising on left leg and right arm. No additional abnormalities noted. Repositioning and turning encouraged.

## 2020-07-21 NOTE — CDMP QUERY
Patient admitted with HTN and SIRS. Patient noted to have +blood cultures, WBC 19, -146, RR 20-47.  If possible, please document in the progress notes and d/c summary if you are evaluating and / or treating any of the following:     Sepsis, present on admission, suspected or probable causative organism (please specify)   Sepsis, now resolved, suspected or probable causative organism (please specify)   Sepsis, not present on admission, suspected or probable causative organism (please specify)   No Sepsis, suspected or probable localized infection (please specify)   Sepsis was ruled out (include corresponding diagnosis for patients clinical picture and treatment)   Other, please specify   Clinically unable to determine    The medical record reflects the following:     Risk Factors: Bacteremia     Clinical Indicators: +blood cultures MSSA, WBC 19, -146, RR 20-47     Treatment: Beny Wolfe Willo Loser, RN, BSN, CDS  Clinical Documentation Improvement  [phone]

## 2020-07-22 PROBLEM — K59.00 CONSTIPATION: Status: ACTIVE | Noted: 2020-07-22

## 2020-07-22 PROBLEM — A41.01 SEPSIS DUE TO METHICILLIN SUSCEPTIBLE STAPHYLOCOCCUS AUREUS (MSSA) WITHOUT ACUTE ORGAN DYSFUNCTION (HCC): Status: ACTIVE | Noted: 2020-07-22

## 2020-07-22 PROBLEM — R11.2 NAUSEA AND VOMITING: Status: ACTIVE | Noted: 2020-07-22

## 2020-07-22 LAB
ANION GAP SERPL CALC-SCNC: 8 MMOL/L (ref 7–16)
BASOPHILS # BLD: 0 K/UL (ref 0–0.2)
BASOPHILS NFR BLD: 0 % (ref 0–2)
BUN SERPL-MCNC: 27 MG/DL (ref 6–23)
CALCIUM SERPL-MCNC: 9.3 MG/DL (ref 8.3–10.4)
CHLORIDE SERPL-SCNC: 100 MMOL/L (ref 98–107)
CO2 SERPL-SCNC: 28 MMOL/L (ref 21–32)
CREAT SERPL-MCNC: 1.19 MG/DL (ref 0.6–1)
DIFFERENTIAL METHOD BLD: ABNORMAL
EOSINOPHIL # BLD: 0 K/UL (ref 0–0.8)
EOSINOPHIL NFR BLD: 0 % (ref 0.5–7.8)
ERYTHROCYTE [DISTWIDTH] IN BLOOD BY AUTOMATED COUNT: 15.6 % (ref 11.9–14.6)
GLUCOSE SERPL-MCNC: 118 MG/DL (ref 65–100)
HCT VFR BLD AUTO: 37.9 % (ref 35.8–46.3)
HGB BLD-MCNC: 12.5 G/DL (ref 11.7–15.4)
IMM GRANULOCYTES # BLD AUTO: 0.2 K/UL (ref 0–0.5)
IMM GRANULOCYTES NFR BLD AUTO: 1 % (ref 0–5)
LYMPHOCYTES # BLD: 1.2 K/UL (ref 0.5–4.6)
LYMPHOCYTES NFR BLD: 8 % (ref 13–44)
MAGNESIUM SERPL-MCNC: 2.7 MG/DL (ref 1.8–2.4)
MCH RBC QN AUTO: 30 PG (ref 26.1–32.9)
MCHC RBC AUTO-ENTMCNC: 33 G/DL (ref 31.4–35)
MCV RBC AUTO: 90.9 FL (ref 79.6–97.8)
MONOCYTES # BLD: 1.6 K/UL (ref 0.1–1.3)
MONOCYTES NFR BLD: 11 % (ref 4–12)
NEUTS SEG # BLD: 11.6 K/UL (ref 1.7–8.2)
NEUTS SEG NFR BLD: 79 % (ref 43–78)
NRBC # BLD: 0 K/UL (ref 0–0.2)
PLATELET # BLD AUTO: 171 K/UL (ref 150–450)
PMV BLD AUTO: 13.2 FL (ref 9.4–12.3)
POTASSIUM SERPL-SCNC: 3.4 MMOL/L (ref 3.5–5.1)
RBC # BLD AUTO: 4.17 M/UL (ref 4.05–5.2)
SODIUM SERPL-SCNC: 136 MMOL/L (ref 136–145)
TROPONIN-HIGH SENSITIVITY: 5 PG/ML (ref 0–14)
WBC # BLD AUTO: 14.6 K/UL (ref 4.3–11.1)

## 2020-07-22 PROCEDURE — 77010033678 HC OXYGEN DAILY

## 2020-07-22 PROCEDURE — 74011250636 HC RX REV CODE- 250/636: Performed by: INTERNAL MEDICINE

## 2020-07-22 PROCEDURE — 74011000250 HC RX REV CODE- 250: Performed by: INTERNAL MEDICINE

## 2020-07-22 PROCEDURE — 65660000000 HC RM CCU STEPDOWN

## 2020-07-22 PROCEDURE — 77030038269 HC DRN EXT URIN PURWCK BARD -A

## 2020-07-22 PROCEDURE — 77030040393 HC DRSG OPTIFOAM GENT MDII -B

## 2020-07-22 PROCEDURE — 99253 IP/OBS CNSLTJ NEW/EST LOW 45: CPT | Performed by: INTERNAL MEDICINE

## 2020-07-22 PROCEDURE — 74011000258 HC RX REV CODE- 258: Performed by: INTERNAL MEDICINE

## 2020-07-22 PROCEDURE — 36415 COLL VENOUS BLD VENIPUNCTURE: CPT

## 2020-07-22 PROCEDURE — 65270000029 HC RM PRIVATE

## 2020-07-22 PROCEDURE — 84484 ASSAY OF TROPONIN QUANT: CPT

## 2020-07-22 PROCEDURE — 99152 MOD SED SAME PHYS/QHP 5/>YRS: CPT

## 2020-07-22 PROCEDURE — 74011250637 HC RX REV CODE- 250/637: Performed by: FAMILY MEDICINE

## 2020-07-22 PROCEDURE — 93312 ECHO TRANSESOPHAGEAL: CPT

## 2020-07-22 PROCEDURE — 83735 ASSAY OF MAGNESIUM: CPT

## 2020-07-22 PROCEDURE — 74011250637 HC RX REV CODE- 250/637: Performed by: INTERNAL MEDICINE

## 2020-07-22 PROCEDURE — 74011250637 HC RX REV CODE- 250/637: Performed by: HOSPITALIST

## 2020-07-22 PROCEDURE — 94760 N-INVAS EAR/PLS OXIMETRY 1: CPT

## 2020-07-22 PROCEDURE — 74011250636 HC RX REV CODE- 250/636: Performed by: FAMILY MEDICINE

## 2020-07-22 PROCEDURE — 80048 BASIC METABOLIC PNL TOTAL CA: CPT

## 2020-07-22 PROCEDURE — 85025 COMPLETE CBC W/AUTO DIFF WBC: CPT

## 2020-07-22 RX ORDER — DOCUSATE SODIUM 100 MG/1
100 CAPSULE, LIQUID FILLED ORAL DAILY
Status: DISCONTINUED | OUTPATIENT
Start: 2020-07-22 | End: 2020-07-28 | Stop reason: HOSPADM

## 2020-07-22 RX ORDER — FENTANYL CITRATE 50 UG/ML
25-50 INJECTION, SOLUTION INTRAMUSCULAR; INTRAVENOUS
Status: DISCONTINUED | OUTPATIENT
Start: 2020-07-22 | End: 2020-07-24

## 2020-07-22 RX ORDER — TRAMADOL HYDROCHLORIDE 50 MG/1
50 TABLET ORAL
Status: DISCONTINUED | OUTPATIENT
Start: 2020-07-22 | End: 2020-07-28 | Stop reason: HOSPADM

## 2020-07-22 RX ORDER — LIDOCAINE HYDROCHLORIDE 20 MG/ML
15 SOLUTION OROPHARYNGEAL AS NEEDED
Status: DISCONTINUED | OUTPATIENT
Start: 2020-07-22 | End: 2020-07-28 | Stop reason: HOSPADM

## 2020-07-22 RX ORDER — CEFAZOLIN SODIUM/WATER 2 G/20 ML
2 SYRINGE (ML) INTRAVENOUS EVERY 8 HOURS
Status: DISCONTINUED | OUTPATIENT
Start: 2020-07-22 | End: 2020-07-28 | Stop reason: HOSPADM

## 2020-07-22 RX ORDER — MIDAZOLAM HYDROCHLORIDE 1 MG/ML
.5-2 INJECTION, SOLUTION INTRAMUSCULAR; INTRAVENOUS
Status: DISCONTINUED | OUTPATIENT
Start: 2020-07-22 | End: 2020-07-24 | Stop reason: ALTCHOICE

## 2020-07-22 RX ORDER — POLYETHYLENE GLYCOL 3350 17 G/17G
17 POWDER, FOR SOLUTION ORAL DAILY
Status: DISCONTINUED | OUTPATIENT
Start: 2020-07-22 | End: 2020-07-28

## 2020-07-22 RX ORDER — POTASSIUM CHLORIDE 20 MEQ/1
40 TABLET, EXTENDED RELEASE ORAL ONCE
Status: COMPLETED | OUTPATIENT
Start: 2020-07-22 | End: 2020-07-22

## 2020-07-22 RX ORDER — POLYETHYLENE GLYCOL 3350 17 G/17G
17 POWDER, FOR SOLUTION ORAL 3 TIMES DAILY
Status: DISCONTINUED | OUTPATIENT
Start: 2020-07-22 | End: 2020-07-28 | Stop reason: HOSPADM

## 2020-07-22 RX ADMIN — POTASSIUM CHLORIDE 20 MEQ: 20 TABLET, EXTENDED RELEASE ORAL at 08:05

## 2020-07-22 RX ADMIN — FENTANYL CITRATE 50 MCG: 50 INJECTION, SOLUTION INTRAMUSCULAR; INTRAVENOUS at 14:45

## 2020-07-22 RX ADMIN — DOCUSATE SODIUM 100 MG: 100 CAPSULE, LIQUID FILLED ORAL at 10:18

## 2020-07-22 RX ADMIN — MIDAZOLAM HYDROCHLORIDE 1 MG: 1 INJECTION, SOLUTION INTRAMUSCULAR; INTRAVENOUS at 15:01

## 2020-07-22 RX ADMIN — TRAMADOL HYDROCHLORIDE 50 MG: 50 TABLET, FILM COATED ORAL at 21:27

## 2020-07-22 RX ADMIN — Medication 5 ML: at 12:46

## 2020-07-22 RX ADMIN — Medication 10 ML: at 21:27

## 2020-07-22 RX ADMIN — SODIUM CHLORIDE 1000 MG: 900 INJECTION, SOLUTION INTRAVENOUS at 10:15

## 2020-07-22 RX ADMIN — POLYETHYLENE GLYCOL 3350 17 G: 17 POWDER, FOR SOLUTION ORAL at 10:15

## 2020-07-22 RX ADMIN — ACETAMINOPHEN 650 MG: 325 TABLET, FILM COATED ORAL at 04:10

## 2020-07-22 RX ADMIN — FENTANYL CITRATE 25 MCG: 50 INJECTION, SOLUTION INTRAMUSCULAR; INTRAVENOUS at 14:47

## 2020-07-22 RX ADMIN — ONDANSETRON 4 MG: 2 INJECTION INTRAMUSCULAR; INTRAVENOUS at 08:06

## 2020-07-22 RX ADMIN — LISINOPRIL AND HYDROCHLOROTHIAZIDE 1 TABLET: 25; 20 TABLET ORAL at 08:05

## 2020-07-22 RX ADMIN — MAGNESIUM GLUCONATE 500 MG ORAL TABLET 400 MG: 500 TABLET ORAL at 17:55

## 2020-07-22 RX ADMIN — MAGNESIUM GLUCONATE 500 MG ORAL TABLET 400 MG: 500 TABLET ORAL at 08:05

## 2020-07-22 RX ADMIN — POTASSIUM CHLORIDE 40 MEQ: 20 TABLET, EXTENDED RELEASE ORAL at 17:55

## 2020-07-22 RX ADMIN — MIDAZOLAM HYDROCHLORIDE 2 MG: 1 INJECTION, SOLUTION INTRAMUSCULAR; INTRAVENOUS at 14:45

## 2020-07-22 RX ADMIN — OXYCODONE HYDROCHLORIDE AND ACETAMINOPHEN 1 TABLET: 5; 325 TABLET ORAL at 08:05

## 2020-07-22 RX ADMIN — CEFAZOLIN SODIUM 2 G: 100 INJECTION, POWDER, LYOPHILIZED, FOR SOLUTION INTRAVENOUS at 17:59

## 2020-07-22 RX ADMIN — ACETAMINOPHEN 650 MG: 325 TABLET, FILM COATED ORAL at 17:55

## 2020-07-22 RX ADMIN — MIDAZOLAM HYDROCHLORIDE 1 MG: 1 INJECTION, SOLUTION INTRAMUSCULAR; INTRAVENOUS at 14:59

## 2020-07-22 RX ADMIN — MIDAZOLAM HYDROCHLORIDE 2 MG: 1 INJECTION, SOLUTION INTRAMUSCULAR; INTRAVENOUS at 14:47

## 2020-07-22 RX ADMIN — ACETAMINOPHEN 650 MG: 325 TABLET, FILM COATED ORAL at 10:15

## 2020-07-22 RX ADMIN — ONDANSETRON 4 MG: 2 INJECTION INTRAMUSCULAR; INTRAVENOUS at 12:40

## 2020-07-22 RX ADMIN — POLYETHYLENE GLYCOL 3350 17 G: 17 POWDER, FOR SOLUTION ORAL at 21:27

## 2020-07-22 RX ADMIN — Medication 10 ML: at 05:36

## 2020-07-22 NOTE — PROGRESS NOTES
TRANSFER - OUT REPORT:    Verbal report given to Deric Salter RN(name) on Gay Camacho  being transferred to  [facility](unit) for routine post - op       Report consisted of patients Situation, Background, Assessment and   Recommendations(SBAR). Information from the following report(s) Procedure Summary and MAR was reviewed with the receiving nurse. Opportunity for questions and clarification was provided.

## 2020-07-22 NOTE — ROUTINE PROCESS
Bedside and Verbal shift change report given to myself (oncoming nurse) by Solis Bhakta RN (offgoing nurse). Report included the following information SBAR, Kardex, MAR and Recent Results.

## 2020-07-22 NOTE — PROGRESS NOTES
Arti Jimenez  II ASA II Mallampati  6mg versed  75mcg fentanyl  Viscous Solution given at 1444  Pt tolerated well.     Start time 1445 End time 97 70 84

## 2020-07-22 NOTE — ROUTINE PROCESS
Bedside and Verbal shift change report given to myself (oncoming nurse) by Rachid Gonzalez RN (offgoing nurse). Report included the following information SBAR, Kardex, MAR and Recent Results.

## 2020-07-22 NOTE — PROGRESS NOTES
Hospitalist Progress Note     Admit Date:  2020  7:36 AM   Name:  Jayleen Perdue   Age:  64 y.o.  :  1960   MRN:  [id number]   PCP:  Jacqueline Gomez MD  Treatment Team: Attending Provider: Marietta Cornell DO; Care Manager: Greyson Brownlee, RN; Utilization Review: Hyacinth Hameed RN; Consulting Provider: Jamal Stratton MD    Subjective:   HPI and or CC:  From history and physical HPI:  64 yr old  female patient who follows up with [facility], off her blood pressure medication for past 1 week. Known history of hypertension, morbid obesity, arthritis, sciatica and spine issues.     Pt says she is off blood pressure meds for a week. Since past 4 days started having sharp pain rt shoulder blade , then both shoulder blade region, constant, moderate intensity, not improving even after several doses of nsaid. Says also has sciatica pain more on the rt lower extremity. Started having vomiting, non bilious , about 7 episodes since last night. Dizziness, on and off since past 4 days ,since her blood pressures have been up.     Pt otherwise doesn't c/o head or shortness of breath or any left sided chest pain.     Wbc 19,k 3, bp 219/117  CT OF THE CHEST ABDOMEN AND PELVIS  1) Trace right pleural fusion. 2) Benign fat-containing 5.5 cm right adrenal adenoma.     Pt presently on cardene drip for elevated bp.     Pt will be admitted to ICU for malignant htn, hypokalemia and does meet the criteria for SIRS    2020:  Chief complaint malaise, constipation, nausea and retching    Blood pressure has normalized with home meds. Agreeable to potassium and magnesium supplementation regarding diuretic. Correcting electrolytes. She does have staph aureusMSSA bacteremia/sepsis. At present time suspect uncomplicated. No indwelling hardware/prosthetics or lines. Transthoracic echocardiogram 3.   Infectious disease consultation regarding antibiotic selection, dosage, and duration ordered. Hopefully 14 days IV therapy. Started antibiotics directed towards gram-positive yesterday with vancomycin but did receive Rocephin day prior. Repeat blood cultures for a.m. ordered. She complains of significant ill-defined pain  And does admit to use of marijuana. Complains of shoulder blade pain and x-ray imaging negative. Palpable reproducible noted. Wished to avoid acceleration of controlled substances discussed. .      Objective:     Patient Vitals for the past 24 hrs:   Temp Pulse Resp BP SpO2   07/22/20 0723 98.1 °F (36.7 °C) 88 20 137/82 95 %   07/22/20 0453 97.9 °F (36.6 °C) 80 18 138/80 95 %   07/22/20 0110 97.9 °F (36.6 °C) 83 18 (!) 152/92 94 %   07/21/20 2216 98.1 °F (36.7 °C) 91 20 142/77 94 %   07/21/20 1914 97.6 °F (36.4 °C) 91 20 126/64 92 %   07/21/20 1315 96.8 °F (36 °C) 93 20 124/59 90 %     Oxygen Therapy  O2 Sat (%): 95 % (07/22/20 0723)  Pulse via Oximetry: 106 beats per minute (07/20/20 2200)  O2 Device: Room air (07/22/20 1240)  O2 Flow Rate (L/min): 2 l/min (07/22/20 0723)    Intake/Output Summary (Last 24 hours) at 7/22/2020 1258  Last data filed at 7/22/2020 0400  Gross per 24 hour   Intake 120 ml   Output 550 ml   Net -430 ml         REVIEW OF SYSTEMS: Comprehensive ROS performed and negative except as stated in HPI. Physical Examination:  General:    Well nourished. Alert and oriented x3  Head:  Mucous membranes moist, no facial asymmetry  Eyes:  No roving eye movements, conjunctive clear  CV:   No JVD or HJR heart is regular no murmurs. Lungs:   Lungs clear to auscultation palpation percussion with symmetric excursion  Abdomen:   Obese, soft, bowel sounds all 4 quadrants  Extremities: Multiple extremities well, small healed abrasion left lateral gastrocnemius posteriorly, and lumbothoracic region same. No open wounds. She admits to recurrent nasal \"irritations\"  Skin:     No petechia or purpura, as above no open wounds.   Neuro:  No upper extremity pronator drift, no past-pointing  Psych:  Mood and affect appropriate    Data Review:  I have reviewed all labs, meds, telemetry events, and studies from the last 24 hours. Recent Results (from the past 24 hour(s))   METABOLIC PANEL, BASIC    Collection Time: 07/22/20  4:09 AM   Result Value Ref Range    Sodium 136 136 - 145 mmol/L    Potassium 3.4 (L) 3.5 - 5.1 mmol/L    Chloride 100 98 - 107 mmol/L    CO2 28 21 - 32 mmol/L    Anion gap 8 7 - 16 mmol/L    Glucose 118 (H) 65 - 100 mg/dL    BUN 27 (H) 6 - 23 MG/DL    Creatinine 1.19 (H) 0.6 - 1.0 MG/DL    GFR est AA 60 (L) >60 ml/min/1.73m2    GFR est non-AA 50 (L) >60 ml/min/1.73m2    Calcium 9.3 8.3 - 10.4 MG/DL   CBC WITH AUTOMATED DIFF    Collection Time: 07/22/20  4:09 AM   Result Value Ref Range    WBC 14.6 (H) 4.3 - 11.1 K/uL    RBC 4.17 4.05 - 5.2 M/uL    HGB 12.5 11.7 - 15.4 g/dL    HCT 37.9 35.8 - 46.3 %    MCV 90.9 79.6 - 97.8 FL    MCH 30.0 26.1 - 32.9 PG    MCHC 33.0 31.4 - 35.0 g/dL    RDW 15.6 (H) 11.9 - 14.6 %    PLATELET 331 762 - 131 K/uL    MPV 13.2 (H) 9.4 - 12.3 FL    ABSOLUTE NRBC 0.00 0.0 - 0.2 K/uL    DF AUTOMATED      NEUTROPHILS 79 (H) 43 - 78 %    LYMPHOCYTES 8 (L) 13 - 44 %    MONOCYTES 11 4.0 - 12.0 %    EOSINOPHILS 0 (L) 0.5 - 7.8 %    BASOPHILS 0 0.0 - 2.0 %    IMMATURE GRANULOCYTES 1 0.0 - 5.0 %    ABS. NEUTROPHILS 11.6 (H) 1.7 - 8.2 K/UL    ABS. LYMPHOCYTES 1.2 0.5 - 4.6 K/UL    ABS. MONOCYTES 1.6 (H) 0.1 - 1.3 K/UL    ABS. EOSINOPHILS 0.0 0.0 - 0.8 K/UL    ABS. BASOPHILS 0.0 0.0 - 0.2 K/UL    ABS. IMM.  GRANS. 0.2 0.0 - 0.5 K/UL   MAGNESIUM    Collection Time: 07/22/20  4:09 AM   Result Value Ref Range    Magnesium 2.7 (H) 1.8 - 2.4 mg/dL   TROPONIN-HIGH SENSITIVITY    Collection Time: 07/22/20  4:09 AM   Result Value Ref Range    Troponin-High Sensitivity 5.0 0 - 14 pg/mL        All Micro Results     Procedure Component Value Units Date/Time    CULTURE, BLOOD [[id number]]  (Abnormal) Collected:  07/20/20 1740    Order Status:  Completed Specimen:  Blood Updated:  07/22/20 0731     Special Requests: --        LEFT  HAND       GRAM STAIN       GRAM POS COCCI IN CLUSTERS                  AEROBIC AND ANAEROBIC BOTTLES                  RESULTS VERIFIED, PHONED TO AND READ BACK BY NADIA Her RN ON 7.21.2020 AT 0957,             Culture result:       STAPHYLOCOCCUS AUREUS SENSITIVITY TO FOLLOW            REFER TO Sanna Bales 82 [id number],     CULTURE, BLOOD [[id number]]  (Abnormal) Collected:  07/20/20 1828    Order Status:  Completed Specimen:  Blood Updated:  07/22/20 0730     Special Requests: --        RIGHT  HAND       GRAM STAIN       GRAM POS COCCI IN CLUSTERS                  AEROBIC AND ANAEROBIC BOTTLES                  RESULTS VERIFIED, PHONED TO AND READ BACK BY NADIA Her RN ON 7.21.2020 AT 0957,             Culture result: STAPHYLOCOCCUS AUREUS               CULTURE IN [facility] UPDATES TO FOLLOW          BLOOD CULTURE ID PANEL [[id number]]  (Abnormal) Collected:  07/20/20 1740    Order Status:  Completed Specimen:  Blood Updated:  07/21/20 1121     Acc. no. from Micro Order [id number]     Staphylococcus Detected        Staphylococcus aureus Detected        Comment: RESULTS VERIFIED, PHONED TO AND READ BACK BY  NADIA Her RN ON 7/21/20 @0957,           mecA (Methicillin-Resistance Genes) NOT DETECTED        INTERPRETATION       Gram positive cocci in clusters, identified in realtime PCR as probable MSSA. Comment: Recommend discontinuing IV vancomycin starting cefazolin or nafcillin if patient not on beta-lactam therapy. Infectious Diseases Consult recommended in adult patients. THIS TEST DOES NOT REPLACE SENSITIVITY TESTING.              Current Meds:  Current Facility-Administered Medications   Medication Dose Route Frequency    potassium chloride (K-DUR, KLOR-CON) SR tablet 40 mEq  40 mEq Oral ONCE    polyethylene glycol (MIRALAX) packet 17 g  17 g Oral DAILY    polyethylene glycol (MIRALAX) packet 17 g  17 g Oral TID    docusate sodium (COLACE) capsule 100 mg  100 mg Oral DAILY    ceFAZolin (ANCEF) 2 g/20 mL in sterile water IV syringe  2 g IntraVENous Q8H    hydrALAZINE (APRESOLINE) tablet 10 mg  10 mg Oral Q6H PRN    potassium chloride (K-DUR, KLOR-CON) SR tablet 20 mEq  20 mEq Oral DAILY    alum-mag hydroxide-simeth (MYLANTA) oral suspension 30 mL  30 mL Oral Q4H PRN    lisinopril-hydroCHLOROthiazide (PRINZIDE, ZESTORETIC) 20-25 mg per tablet 1 Tab  1 Tab Oral DAILY    sodium chloride (NS) flush 5-40 mL  5-40 mL IntraVENous Q8H    sodium chloride (NS) flush 5-40 mL  5-40 mL IntraVENous PRN    acetaminophen (TYLENOL) tablet 650 mg  650 mg Oral Q4H PRN    oxyCODONE-acetaminophen (PERCOCET) 5-325 mg per tablet 1 Tab  1 Tab Oral Q4H PRN    morphine injection 1 mg  1 mg IntraVENous Q4H PRN    naloxone (NARCAN) injection 0.4 mg  0.4 mg IntraVENous PRN    diphenhydrAMINE (BENADRYL) injection 12.5 mg  12.5 mg IntraVENous Q4H PRN    ondansetron (ZOFRAN) injection 4 mg  4 mg IntraVENous Q4H PRN    bisacodyL (DULCOLAX) tablet 5 mg  5 mg Oral DAILY PRN    magnesium oxide (MAG-OX) tablet 400 mg  400 mg Oral BID       Diet:  DIET CARDIAC    Other Studies (last 24 hours):  No results found.     Assessment and Plan:     Hospital Problems as of 7/22/2020 Never Reviewed          Codes Class Noted - Resolved POA    Constipation ICD-10-CM: K59.00  ICD-9-CM: 564.00  7/22/2020 - Present Unknown        Nausea and vomiting ICD-10-CM: R11.2  ICD-9-CM: 787.01  7/22/2020 - Present Unknown        * (Principal) Sepsis due to methicillin susceptible Staphylococcus aureus (MSSA) without acute organ dysfunction ([facility] [address] ICD-10-CM: A41.01  ICD-9-CM: 038.11, 995.91  7/22/2020 - Present Unknown        Bacteremia ICD-10-CM: R78.81  ICD-9-CM: 790.7  7/21/2020 - Present Unknown        Hypokalemia ICD-10-CM: E87.6  ICD-9-CM: 276.8  7/20/2020 - Present Unknown        HTN (hypertension), malignant ICD-10-CM: I10  ICD-9-CM: 401.0  7/20/2020 - Present Unknown        Arthritis ICD-10-CM: M19.90  ICD-9-CM: 716.90  7/20/2020 - Present Unknown        Morbidly obese ([facility] [address] ICD-10-CM: E66.01  ICD-9-CM: 278.01  7/20/2020 - Present Unknown        Adrenal adenoma, right ICD-10-CM: D35.01  ICD-9-CM: 227.0  7/20/2020 - Present Unknown    Overview Signed 7/20/2020  2:53 PM by Bird Mendez MD     Benign fat-containing 5.5 cm right adrenal adenoma             SIRS (systemic inflammatory response syndrome) ([facility] [address] ICD-10-CM: R65.10  ICD-9-CM: 995.90  7/20/2020 - Present Unknown              A/P:      --Sepsis due to MSSA not complicated by organ dysfunctionappreciate ID recommendations regarding duration of therapy work-up pending but appears to be uncomplicated and hopefully 14 days IV therapy. Vancomycin changed to Ancef this morning regarding bio fire screening    --- Malignant hypertension-- improved. Continue home ACE inhibitor diuretic combination with correction of electrolytes and fixed dose potassium and magnesium supplementation    -Adrenal adenoma-Outpatient follow-up imagingbenign appearing comment regarding fat-containing    Scapular pain very likely musculoskeletal related to spasm--discussed avoid acceleration of controlled substances. DC planning/Dispo: Pending recommended duration IV antibioticswill order PICC line as patient will need at least 14 days. DVT ppx: Subcutaneous heparin    Code status: Full code  Medical decision maker: Selffamily contacts on file      Signed:  Burton BISHOP

## 2020-07-22 NOTE — PROGRESS NOTES
This nurse sent a message to Dr Carolyn Cristobal with ID, via [employer], about PICC placement. She would like for us to wait until it is cleared by them, to place.   Raisa Duckworth RN, VAT

## 2020-07-22 NOTE — PROGRESS NOTES
Sleep Disorder Breathing Screen:     Patient reports symptoms of:   · Excessive daytime sleepiness  · Waking with gasping  · STOP-BAN  · [place] Score: 10   · Height: 5'4\"  · Weight: 258 lbs  · BMI: 44.4     Discussed the physiological detriment and pathways to co-morbid conditions resulting from sleep disordered breathing. Patient was receptive and agreed to undergo polysomnography. Refer patient for sleep study based on above assessment.

## 2020-07-22 NOTE — ROUTINE PROCESS
Bedside and Verbal shift change report given to Genevie Boast, RN (oncoming nurse) by myself (offgoing nurse). Report included the following information SBAR, Kardex, MAR and Recent Results.

## 2020-07-22 NOTE — CONSULTS
Infectious Disease Consult    Today's Date: 7/22/2020   Admit Date: 7/20/2020    Impression:   · Staph aureus bacteremia, MSSA per PCR (7/20), TTE negative, source not clear, but does have a habit of picking her skin   · Upper back pain, CT c/a/p negative-intermittent  · Nausea/vomiting, persists-follow  · Leukocytosis, improving  · HTN    Plan:   · Continue Cefazolin, increase dose to 2g IV Q8 hrs  · Consult cardiology for DREA  · Follow repeat BC  · Screen for HIV, Hepatitis     Anti-infectives:   · Cefazolin 7/22-  · vanc 7/20-7/22  · CTX 7/20-7/22    Subjective:   Date of Consultation:  July 22, 2020  Referring Physician: Dr Leilani Velasco    Patient is a 64 y.o. female who presented to the ED with several days c/o nausea, emesis, decreased appetite, pain in between shoulder blades. Abd US negative, CT c/a/p negative, noted to have low grade temps and leukocytosis, BC checked with both sets growing staph aureus, mecA gene negative. Leukocytosis is improving, TTE negative, denies implanted devices/HW, no recent procedures/IVs, no open wounds, does have a habit of picking at her skin, no current lesions. She does attest for feeling weakness over the past 6 months or so, also has ongoing constipation. Left arm cat bite about a month ago-healed. Currently on Cefazolin, ID consulted to make treatment recommendations. Seen with son at bedside, reports continued nausea/dry heaving. Uses marijuana a few times a week to assist with insomnia. Patient Active Problem List   Diagnosis Code    Hypokalemia E87.6    HTN (hypertension), malignant I10    Arthritis M19.90    Morbidly obese ([facility] [address] E66.01    Adrenal adenoma, right D35.01    SIRS (systemic inflammatory response syndrome) (HCC) R65.10    Bacteremia R78.81     Past Medical History:   Diagnosis Date    Adrenal adenoma, right 7/20/2020    Arthritis 7/20/2020    Hypertension       History reviewed. No pertinent family history.    Social History     Tobacco Use    Smoking status: Never Smoker   Substance Use Topics    Alcohol use: No     Past Surgical History:   Procedure Laterality Date    HX ORTHOPAEDIC        Prior to Admission medications    Medication Sig Start Date End Date Taking? Authorizing Provider   lisinopril-hydroCHLOROthiazide (PRINZIDE, ZESTORETIC) 20-25 mg per tablet Take 1 Tab by mouth daily. 2/10/19  Yes Harshil Kinsey MD   predniSONE (DELTASONE) 20 mg tablet 3 tabs day 1,2 then 2 tabs day 3,4 then one tab day 5,6 then 1/2 tab day 7,8 19   Mallika Espinoza MD       Allergies   Allergen Reactions   Sorin  [Hydrocodone-Acetaminophen] Nausea and Vomiting        Review of Systems:  A comprehensive review of systems was negative except for that written in the History of Present Illness. Objective:     Visit Vitals  /82   Pulse 88   Temp 98.1 °F (36.7 °C)   Resp 20   Ht 5' 4\" (1.626 m)   Wt 117.4 kg (258 lb 14.4 oz)   SpO2 95%   BMI 44.44 kg/m²     Temp (24hrs), Av.7 °F (36.5 °C), Min:96.8 °F (36 °C), Max:98.1 °F (36.7 °C)       Lines:  Peripheral IV:   Left arm intact    Physical Exam:    General:  Alert, cooperative, obese, appears stated age   Eyes:  Sclera anicteric. Pupils equally round and reactive to light. Mouth/Throat: Mucous membranes normal, oral pharynx clear   Neck: Supple   Lungs:   Clear to auscultation bilaterally, good effort   CV:  Regular rate and rhythm,no murmur, click, rub or gallop   Abdomen:   Soft, non-tender.  bowel sounds normal. non-distended   Extremities: No cyanosis or edema   Skin: Skin color, texture, turgor normal. no acute rash or lesions, healed areas on back/arms   Lymph nodes: Cervical and supraclavicular normal   Musculoskeletal: No swelling or deformity   Lines/Devices:  Intact, no erythema, drainage or tenderness   Psych: Alert and oriented, normal mood affect given the setting       Data Review:     CBC:  Recent Labs     20  0409 20  0453 20  0747   WBC 14.6* 16.2* 19.0*   GRANS 79* 86* 92*   MONOS 11 8 4   EOS 0* 0* 0*   ANEU 11.6* 14.0* 17.5*   ABL 1.2 0.8 0.5   HGB 12.5 11.6* 12.8   HCT 37.9 36.3 39.6    182 189       BMP:  Recent Labs     07/22/20  0409 07/21/20  0453 07/20/20  0747   CREA 1.19* 0.80 0.82   BUN 27* 19 14    138 137   K 3.4* 3.3* 3.0*    101 104   CO2 28 28 25   AGAP 8 9 8   * 128* 122*       LFTS:  Recent Labs     07/20/20  0747   TBILI 0.8   ALT 50      TP 7.7   ALB 2.9*       Microbiology:     All Micro Results     Procedure Component Value Units Date/Time    CULTURE, BLOOD [[id number]]  (Abnormal) Collected:  07/20/20 1740    Order Status:  Completed Specimen:  Blood Updated:  07/22/20 0731     Special Requests: --        LEFT  HAND       GRAM STAIN       GRAM POS COCCI IN CLUSTERS                  AEROBIC AND ANAEROBIC BOTTLES                  RESULTS VERIFIED, PHONED TO AND READ BACK BY NADIA Her RN ON 7.21.2020 AT 09[facility]           Culture result:       STAPHYLOCOCCUS AUREUS SENSITIVITY TO FOLLOW            REFER TO Kassandra De León [id number],     CULTURE, BLOOD [[id number]]  (Abnormal) Collected:  07/20/20 1828    Order Status:  Completed Specimen:  Blood Updated:  07/22/20 0730     Special Requests: --        RIGHT  HAND       GRAM STAIN       GRAM POS COCCI IN CLUSTERS                  AEROBIC AND ANAEROBIC BOTTLES                  RESULTS VERIFIED, PHONED TO AND READ BACK BY NADIA Baez [address] ON 7.21.2020 AT 09,             Culture result: STAPHYLOCOCCUS AUREUS               CULTURE IN [facility] UPDATES TO FOLLOW          BLOOD CULTURE ID PANEL [[id number]]  (Abnormal) Collected:  07/20/20 1740    Order Status:  Completed Specimen:  Blood Updated:  07/21/20 1121     Acc. no. from Micro Order [id number]     Staphylococcus Detected        Staphylococcus aureus Detected        Comment: RESULTS VERIFIED, PHONED TO AND READ BACK BY  NADIA Her RN ON 7/21/20 @09,           mecA (Methicillin-Resistance Genes) NOT DETECTED        INTERPRETATION       Gram positive cocci in clusters, identified in realtime PCR as probable MSSA. Comment: Recommend discontinuing IV vancomycin starting cefazolin or nafcillin if patient not on beta-lactam therapy. Infectious Diseases Consult recommended in adult patients. THIS TEST DOES NOT REPLACE SENSITIVITY TESTING.              Imaging:   Reviewed    Signed By: Ladi Ramirez NP     July 22, 2020

## 2020-07-22 NOTE — ROUTINE PROCESS
TRANSFER - IN REPORT:    Verbal report received from René Ellis, [facility] on Ana Mccann being received from CCL (unit) for routine progression of care. Report consisted of patients Situation, Background, Assessment and Recommendations(SBAR). Information from the following report(s) Procedure Summary was reviewed. Opportunity for questions and clarification was provided. Assessment completed upon patients arrival to unit and care assumed. Patient received to room 302. Patient connected to monitor and assessment completed. Plan of care reviewed. Patient oriented to room and call light. Patient aware to use call light to communicate any chest pain or needs. Pt received viscous solution at 1444, pt can eat/drink @ 1644.

## 2020-07-22 NOTE — ROUTINE PROCESS
Bedside and verbal shift change report given to Louis Casarez [facility] (oncoming nurse) by self Sera trevino). Report included the following information SBAR, Kardex, Intake/Output, MAR, Recent Results.

## 2020-07-22 NOTE — CONSULTS
[facility] Cardiology Consult                Date of  Admission: 7/20/2020  7:36 AM     Primary Care Physician: Julianne Perry MD  Primary Cardiologist: None  Referring Physician: Dr Allison Cruz Physician: Dr Kimber Freitas    CC/Reason for consult: Cookie Fernandez is a 64 y.o. female with hx of adrenal adenoma, arthritis, HTN, and morbid obesity. The patient was admitted on 7/20/2020 with complaints of right shoulder pain, then bilateral shoulder pain, with no improvement after several doses of NSAID. She also noted sciatica pain as well. In the ED she was noted to have HTN as high as 130/122 with slightly elevated HR. She was noted to have WBC of 19 k, K 3.0, normal urine, Cr 0.82, and LA of 0.9. Incidental CT of the chest found 5.5 cm right adrenal adenoma. BC from the ED show Staph A with ID of possible MSSA. The patient was admitted for ICU for malignant HTN with ID consulted for abnormal findings. Currently the patient denies IVDU, recent dental procedures, or surgeries. She has no known source for infection at this time. She has no other complaints at this time except for N and V. She denies ha, loc, ams, cp, dizziness, palpitations, or other problems. Recent Cardiac Synopsis w/ Labs    Echo: 7/20/2020 -  Left ventricle: Systolic function was normal. Ejection fraction was  estimated in the range of 55 % to 60 %. There were no regional wall motion  abnormalities. There was mild concentric hypertrophy. EKG:NSR       Past Medical History:   Diagnosis Date    Adrenal adenoma, right 7/20/2020    Arthritis 7/20/2020    Hypertension     Nausea and vomiting 7/22/2020    Sepsis due to methicillin susceptible Staphylococcus aureus (MSSA) without acute organ dysfunction ([address] 7/22/2020      Past Surgical History:   Procedure Laterality Date    HX ORTHOPAEDIC       Allergies   Allergen Reactions    Lortab [Hydrocodone-Acetaminophen] Nausea and Vomiting      History reviewed.  No pertinent family history.    Social History     Socioeconomic History    Marital status: SINGLE     Spouse name: Not on file    Number of children: Not on file    Years of education: Not on file    Highest education level: Not on file   Occupational History    Not on file   Social Needs    Financial resource strain: Not on file    Food insecurity     Worry: Not on file     Inability: Not on file    Transportation needs     Medical: Not on file     Non-medical: Not on file   Tobacco Use    Smoking status: Never Smoker   Substance and Sexual Activity    Alcohol use: No    Drug use: No    Sexual activity: Not on file   Lifestyle    Physical activity     Days per week: Not on file     Minutes per session: Not on file    Stress: Not on file   Relationships    Social connections     Talks on phone: Not on file     Gets together: Not on file     Attends Jew service: Not on file     Active member of club or organization: Not on file     Attends meetings of clubs or organizations: Not on file     Relationship status: Not on file    Intimate partner violence     Fear of current or ex partner: Not on file     Emotionally abused: Not on file     Physically abused: Not on file     Forced sexual activity: Not on file   Other Topics Concern    Not on file   Social History Narrative    Not on file       Current Facility-Administered Medications   Medication Dose Route Frequency    potassium chloride (K-DUR, KLOR-CON) SR tablet 40 mEq  40 mEq Oral ONCE    polyethylene glycol (MIRALAX) packet 17 g  17 g Oral DAILY    polyethylene glycol (MIRALAX) packet 17 g  17 g Oral TID    docusate sodium (COLACE) capsule 100 mg  100 mg Oral DAILY    ceFAZolin (ANCEF) 2 g/20 mL in sterile water IV syringe  2 g IntraVENous Q8H    hydrALAZINE (APRESOLINE) tablet 10 mg  10 mg Oral Q6H PRN    potassium chloride (K-DUR, KLOR-CON) SR tablet 20 mEq  20 mEq Oral DAILY    alum-mag hydroxide-simeth (MYLANTA) oral suspension 30 mL  30 mL Oral Q4H PRN    lisinopril-hydroCHLOROthiazide (PRINZIDE, ZESTORETIC) 20-25 mg per tablet 1 Tab  1 Tab Oral DAILY    sodium chloride (NS) flush 5-40 mL  5-40 mL IntraVENous Q8H    sodium chloride (NS) flush 5-40 mL  5-40 mL IntraVENous PRN    acetaminophen (TYLENOL) tablet 650 mg  650 mg Oral Q4H PRN    oxyCODONE-acetaminophen (PERCOCET) 5-325 mg per tablet 1 Tab  1 Tab Oral Q4H PRN    morphine injection 1 mg  1 mg IntraVENous Q4H PRN    naloxone (NARCAN) injection 0.4 mg  0.4 mg IntraVENous PRN    diphenhydrAMINE (BENADRYL) injection 12.5 mg  12.5 mg IntraVENous Q4H PRN    ondansetron (ZOFRAN) injection 4 mg  4 mg IntraVENous Q4H PRN    bisacodyL (DULCOLAX) tablet 5 mg  5 mg Oral DAILY PRN    magnesium oxide (MAG-OX) tablet 400 mg  400 mg Oral BID       Review of Systems   Constitution: Positive for malaise/fatigue. Negative for fever, weight gain and weight loss. HENT: Negative. Eyes: Negative. Cardiovascular: Negative for chest pain (currently pain free), claudication, cyanosis, dyspnea on exertion, irregular heartbeat, leg swelling, near-syncope, orthopnea, palpitations, paroxysmal nocturnal dyspnea and syncope. Respiratory: Negative for cough, shortness of breath and wheezing. Endocrine: Negative. Skin: Negative. Musculoskeletal: Positive for arthritis. Shoulder and sciatica pain as noted in HPI   Gastrointestinal: Positive for nausea and vomiting. Genitourinary: Negative. Neurological: Negative for dizziness. Psychiatric/Behavioral: Negative. Allergic/Immunologic: Negative.          Physical Exam  Vitals:    07/21/20 2216 07/22/20 0110 07/22/20 0453 07/22/20 0723   BP: 142/77 (!) 152/92 138/80 137/82   Pulse: 91 83 80 88   Resp: 20 18 18 20   Temp: 98.1 °F (36.7 °C) 97.9 °F (36.6 °C) 97.9 °F (36.6 °C) 98.1 °F (36.7 °C)   SpO2: 94% 94% 95% 95%   Weight:       Height:           Last 3 Recorded Weights in this Encounter    07/20/20 0739 07/20/20 2249 Weight: 117.9 kg (260 lb) 117.4 kg (258 lb 14.4 oz)         Physical Exam:  General: Well Developed, Morbidly obese   HEENT: pupils equal and round, no abnormalities noted  Neck: supple, no JVD, no carotid bruits  Heart: S1S2 with RRR without murmurs or gallops  Lungs: Clear throughout auscultation bilaterally without adventitious sounds  Abd: soft, nontender, nondistended, with good bowel sounds  Ext: warm, no edema, calves supple/nontender, pulses 2+ bilaterally  Skin: warm and dry  Psychiatric: Normal mood and affect  Neurologic: Alert and oriented X 3      Labs:   Recent Labs     07/22/20  0409 07/21/20  0453 07/20/20  0747    138 137   K 3.4* 3.3* 3.0*   MG 2.7*  --  1.8   BUN 27* 19 14   CREA 1.19* 0.80 0.82   * 128* 122*   WBC 14.6* 16.2* 19.0*   HGB 12.5 11.6* 12.8   HCT 37.9 36.3 39.6    182 189     Results     Procedure Component Value Units Date/Time    CULTURE, BLOOD [[id number]]  (Abnormal) Collected:  07/20/20 1828    Order Status:  Completed Specimen:  Blood Updated:  07/22/20 0730     Special Requests: --        RIGHT  HAND       GRAM STAIN       GRAM POS COCCI IN CLUSTERS                  AEROBIC AND ANAEROBIC BOTTLES                  RESULTS VERIFIED, PHONED TO AND READ BACK BY NADIA Her RN ON 7.21.2020 AT 09,             Culture result: STAPHYLOCOCCUS AUREUS               CULTURE IN [address] UPDATES TO FOLLOW          CULTURE, BLOOD [[id number]]  (Abnormal) Collected:  07/20/20 1740    Order Status:  Completed Specimen:  Blood Updated:  07/22/20 0731     Special Requests: --        LEFT  HAND       GRAM STAIN       GRAM POS COCCI IN CLUSTERS                  AEROBIC AND ANAEROBIC BOTTLES                  RESULTS VERIFIED, PHONED TO AND READ BACK BY NADIA Her RN ON 7.21.2020 AT 0957,             Culture result:       STAPHYLOCOCCUS AUREUS SENSITIVITY TO FOLLOW            REFER TO Sanna Bales 82 [id number],     BLOOD CULTURE ID PANEL [[id number]] (Abnormal) Collected:  07/20/20 1740    Order Status:  Completed Specimen:  Blood Updated:  07/21/20 1121     Acc. no. from Micro Order [id number]     Staphylococcus Detected        Staphylococcus aureus Detected        Comment: RESULTS VERIFIED, PHONED TO AND READ BACK BY  NADIA Her RN ON 7/21/20 @0957,           mecA (Methicillin-Resistance Genes) NOT DETECTED        INTERPRETATION       Gram positive cocci in clusters, identified in realtime PCR as probable MSSA. Comment: Recommend discontinuing IV vancomycin starting cefazolin or nafcillin if patient not on beta-lactam therapy. Infectious Diseases Consult recommended in adult patients. THIS TEST DOES NOT REPLACE SENSITIVITY TESTING. Xr Chest Pa Lat    Result Date: 7/20/2020  IMPRESSION: Questionable widening upper mediastinum although this may be due to rotation. CT should be considered if clinically indicated. Ct Chest Abd Pelv W Wo Cont    Result Date: 7/20/2020  IMPRESSION: 1) Trace right pleural fusion. 2) Benign fat-containing 5.5 cm right adrenal adenoma. [facility]    Result Date: 7/20/2020  IMPRESSION: Negative for gallstones or biliary tree obstruction. Patient was tender over the gallbladder. There is a 4.9 cm mass in the right upper quadrant, may be unusual. Coarsened liver with possible adrenal mass. CT scan or MRI would better evaluate this. Assessment/Plan:     Assessment:      Principal Problem:    Sepsis due to methicillin susceptible Staphylococcus aureus (MSSA) without acute organ dysfunction ([address] (7/22/2020) Per ID, unknown source a this time. BC x1 positive. Pt has no problems swallowing and has been NPO with nothing solid to eat. Will proceed with DREA. Further recommendations pending clinical course and attending recommendations.     Active Problems:    Hypokalemia (7/20/2020) replace per primary Team      HTN (hypertension), malignant (7/20/2020) Off Cardizem, Pt on Hydralazine tablet PRN, Lisinopril HCTZ 20-25, BP now 137/82. Continue current therapy. Arthritis (7/20/2020) Per Primary Team      Morbidly obese [facility]) (7/20/2020) Per Primary Team      Adrenal adenoma, right (7/20/2020)      Overview: Benign fat-containing 5.5 cm right adrenal adenoma      SIRS (systemic inflammatory response syndrome) [address] [address] (7/20/2020) Per Primary team and ID, unknown source at this time, leukocytosis improving, on ABX, will proceed with DREA for possible source of infection. Bacteremia (7/21/2020) see above      Constipation (7/22/2020) per primary team      Nausea and vomiting (7/22/2020) per primary team    Thank you very much for this referral. We appreciate the opportunity to participate in this patient's care. We will follow along with above stated plan.     Delilah Bailey NP AGACNP-BC  Consulting MD: Dr Tarik Zavaleta

## 2020-07-22 NOTE — PROGRESS NOTES
Problem: Falls - Risk of  Goal: *Absence of Falls  Description: Document Saad Gonzalezncer Fall Risk and appropriate interventions in the flowsheet.   Outcome: Progressing Towards Goal  Note: Fall Risk Interventions:  Mobility Interventions: OT consult for ADLs, Patient to call before getting OOB, PT Consult for mobility concerns         Medication Interventions: Patient to call before getting OOB, Teach patient to arise slowly    Elimination Interventions: Call light in reach              Problem: Patient Education: Go to Patient Education Activity  Goal: Patient/Family Education  Outcome: Progressing Towards Goal

## 2020-07-23 LAB
ANION GAP SERPL CALC-SCNC: 6 MMOL/L (ref 7–16)
BACTERIA SPEC CULT: ABNORMAL
BASOPHILS # BLD: 0.1 K/UL (ref 0–0.2)
BASOPHILS NFR BLD: 0 % (ref 0–2)
BUN SERPL-MCNC: 24 MG/DL (ref 6–23)
CALCIUM SERPL-MCNC: 8.6 MG/DL (ref 8.3–10.4)
CHLORIDE SERPL-SCNC: 98 MMOL/L (ref 98–107)
CO2 SERPL-SCNC: 32 MMOL/L (ref 21–32)
CREAT SERPL-MCNC: 0.98 MG/DL (ref 0.6–1)
DIFFERENTIAL METHOD BLD: ABNORMAL
EOSINOPHIL # BLD: 0 K/UL (ref 0–0.8)
EOSINOPHIL NFR BLD: 0 % (ref 0.5–7.8)
ERYTHROCYTE [DISTWIDTH] IN BLOOD BY AUTOMATED COUNT: 15.7 % (ref 11.9–14.6)
GLUCOSE SERPL-MCNC: 130 MG/DL (ref 65–100)
GRAM STN SPEC: ABNORMAL
HCT VFR BLD AUTO: 39.4 % (ref 35.8–46.3)
HGB BLD-MCNC: 12.5 G/DL (ref 11.7–15.4)
HIV 1+2 AB+HIV1 P24 AG SERPL QL IA: NONREACTIVE
HIV12 RESULT COMMENT, HHIVC: ABNORMAL
IMM GRANULOCYTES # BLD AUTO: 0.3 K/UL (ref 0–0.5)
IMM GRANULOCYTES NFR BLD AUTO: 2 % (ref 0–5)
LYMPHOCYTES # BLD: 1.5 K/UL (ref 0.5–4.6)
LYMPHOCYTES NFR BLD: 10 % (ref 13–44)
MAGNESIUM SERPL-MCNC: 2.4 MG/DL (ref 1.8–2.4)
MCH RBC QN AUTO: 28.6 PG (ref 26.1–32.9)
MCHC RBC AUTO-ENTMCNC: 31.7 G/DL (ref 31.4–35)
MCV RBC AUTO: 90.2 FL (ref 79.6–97.8)
MONOCYTES # BLD: 2 K/UL (ref 0.1–1.3)
MONOCYTES NFR BLD: 13 % (ref 4–12)
NEUTS SEG # BLD: 11.7 K/UL (ref 1.7–8.2)
NEUTS SEG NFR BLD: 76 % (ref 43–78)
NRBC # BLD: 0 K/UL (ref 0–0.2)
PLATELET # BLD AUTO: 192 K/UL (ref 150–450)
PMV BLD AUTO: 13.2 FL (ref 9.4–12.3)
POTASSIUM SERPL-SCNC: 3.5 MMOL/L (ref 3.5–5.1)
RBC # BLD AUTO: 4.37 M/UL (ref 4.05–5.2)
SERVICE CMNT-IMP: ABNORMAL
SERVICE CMNT-IMP: ABNORMAL
SODIUM SERPL-SCNC: 136 MMOL/L (ref 136–145)
WBC # BLD AUTO: 15.6 K/UL (ref 4.3–11.1)

## 2020-07-23 PROCEDURE — 74011000250 HC RX REV CODE- 250: Performed by: INTERNAL MEDICINE

## 2020-07-23 PROCEDURE — 80048 BASIC METABOLIC PNL TOTAL CA: CPT

## 2020-07-23 PROCEDURE — 74011250636 HC RX REV CODE- 250/636: Performed by: INTERNAL MEDICINE

## 2020-07-23 PROCEDURE — 77030038269 HC DRN EXT URIN PURWCK BARD -A

## 2020-07-23 PROCEDURE — 85025 COMPLETE CBC W/AUTO DIFF WBC: CPT

## 2020-07-23 PROCEDURE — 87389 HIV-1 AG W/HIV-1&-2 AB AG IA: CPT

## 2020-07-23 PROCEDURE — 74011250637 HC RX REV CODE- 250/637: Performed by: HOSPITALIST

## 2020-07-23 PROCEDURE — 65270000029 HC RM PRIVATE

## 2020-07-23 PROCEDURE — 74011250637 HC RX REV CODE- 250/637: Performed by: INTERNAL MEDICINE

## 2020-07-23 PROCEDURE — 87040 BLOOD CULTURE FOR BACTERIA: CPT

## 2020-07-23 PROCEDURE — 74011250637 HC RX REV CODE- 250/637: Performed by: FAMILY MEDICINE

## 2020-07-23 PROCEDURE — 80074 ACUTE HEPATITIS PANEL: CPT

## 2020-07-23 PROCEDURE — 83735 ASSAY OF MAGNESIUM: CPT

## 2020-07-23 PROCEDURE — 36415 COLL VENOUS BLD VENIPUNCTURE: CPT

## 2020-07-23 RX ORDER — POTASSIUM CHLORIDE 20 MEQ/1
40 TABLET, EXTENDED RELEASE ORAL
Status: COMPLETED | OUTPATIENT
Start: 2020-07-23 | End: 2020-07-23

## 2020-07-23 RX ADMIN — POTASSIUM CHLORIDE 40 MEQ: 20 TABLET, EXTENDED RELEASE ORAL at 14:47

## 2020-07-23 RX ADMIN — ACETAMINOPHEN 650 MG: 325 TABLET, FILM COATED ORAL at 08:32

## 2020-07-23 RX ADMIN — Medication 10 ML: at 14:47

## 2020-07-23 RX ADMIN — Medication 10 ML: at 05:04

## 2020-07-23 RX ADMIN — ACETAMINOPHEN 650 MG: 325 TABLET, FILM COATED ORAL at 04:31

## 2020-07-23 RX ADMIN — CEFAZOLIN SODIUM 2 G: 100 INJECTION, POWDER, LYOPHILIZED, FOR SOLUTION INTRAVENOUS at 09:41

## 2020-07-23 RX ADMIN — POTASSIUM CHLORIDE 20 MEQ: 20 TABLET, EXTENDED RELEASE ORAL at 08:31

## 2020-07-23 RX ADMIN — CEFAZOLIN SODIUM 2 G: 100 INJECTION, POWDER, LYOPHILIZED, FOR SOLUTION INTRAVENOUS at 17:23

## 2020-07-23 RX ADMIN — MAGNESIUM GLUCONATE 500 MG ORAL TABLET 400 MG: 500 TABLET ORAL at 17:33

## 2020-07-23 RX ADMIN — ACETAMINOPHEN 650 MG: 325 TABLET, FILM COATED ORAL at 14:46

## 2020-07-23 RX ADMIN — LISINOPRIL AND HYDROCHLOROTHIAZIDE 1 TABLET: 25; 20 TABLET ORAL at 08:40

## 2020-07-23 RX ADMIN — CEFAZOLIN SODIUM 2 G: 100 INJECTION, POWDER, LYOPHILIZED, FOR SOLUTION INTRAVENOUS at 02:05

## 2020-07-23 RX ADMIN — Medication 10 ML: at 17:25

## 2020-07-23 RX ADMIN — ACETAMINOPHEN 650 MG: 325 TABLET, FILM COATED ORAL at 19:22

## 2020-07-23 RX ADMIN — MAGNESIUM GLUCONATE 500 MG ORAL TABLET 400 MG: 500 TABLET ORAL at 08:31

## 2020-07-23 RX ADMIN — Medication 5 ML: at 21:50

## 2020-07-23 RX ADMIN — TRAMADOL HYDROCHLORIDE 50 MG: 50 TABLET, FILM COATED ORAL at 21:50

## 2020-07-23 RX ADMIN — DOCUSATE SODIUM 100 MG: 100 CAPSULE, LIQUID FILLED ORAL at 08:32

## 2020-07-23 NOTE — ROUTINE PROCESS
Bedside and Verbal shift change report received from [facility], [facility] (offgoing nurse) to self (oncoming nurse). Report included the following information SBAR, Kardex, Intake/Output, MAR, Recent Results.

## 2020-07-23 NOTE — PROGRESS NOTES
Hospitalist Progress Note     Admit Date:  2020  7:36 AM   Name:  Swathi De Leon   Age:  64 y.o.  :  1960   MRN:  [id number]   PCP:  Natacha Salinas MD  Treatment Team: Attending Provider: Glen Jimenez DO; Care Manager: Cintia Mishra, RN; Utilization Review: Manjula Bryson RN; Consulting Provider: Leyda Mayorga NP; Consulting Provider: Mar Alvarez MD    Subjective:   HPI and or CC:  From history and physical HPI:  64 yr old  female patient who follows up with [facility], off her blood pressure medication for past 1 week. Known history of hypertension, morbid obesity, arthritis, sciatica and spine issues.     Pt says she is off blood pressure meds for a week. Since past 4 days started having sharp pain rt shoulder blade , then both shoulder blade region, constant, moderate intensity, not improving even after several doses of nsaid. Says also has sciatica pain more on the rt lower extremity. Started having vomiting, non bilious , about 7 episodes since last night. Dizziness, on and off since past 4 days ,since her blood pressures have been up.     Pt otherwise doesn't c/o head or shortness of breath or any left sided chest pain.     Wbc 19,k 3, bp 219/117  CT OF THE CHEST ABDOMEN AND PELVIS  1) Trace right pleural fusion. 2) Benign fat-containing 5.5 cm right adrenal adenoma.     Pt presently on cardene drip for elevated bp.     Pt will be admitted to ICU for malignant htn, hypokalemia and does meet the criteria for SIRS    2020:  Chief complaint : sepsis/bacteremia    Patient's musculoskeletal pain significantly improved nausea and vomiting improved without narcotics. PICC line held pending negative culture result 48 hoursrepeat blood culture sent today. Patient may shower. Clinically she is improving. Appetite fair.       Objective:     Patient Vitals for the past 24 hrs:   Temp Pulse Resp BP SpO2   20 1231 98.4 °F (36.9 °C) 82 19 134/81 92 %   07/23/20 0853 98.8 °F (37.1 °C) 83 21 (!) 154/99 94 %   07/23/20 0425 97.9 °F (36.6 °C) 84 20 (!) 154/95 91 %   07/23/20 0120 98.1 °F (36.7 °C) 83 20 (!) 151/92 96 %   07/22/20 2029 98.2 °F (36.8 °C) 79 22 157/89 91 %   07/22/20 1830 97.9 °F (36.6 °C) 98 20 133/74 90 %   07/22/20 1559  88  (!) 171/99 98 %   07/22/20 1543  84  (!) 174/95 98 %   07/22/20 1528  84  (!) 178/105    07/22/20 1514  99  (!) 187/101    07/22/20 1459  93  (!) 178/111 96 %   07/22/20 1456  91  (!) 161/99 93 %   07/22/20 1443    (!) 164/96 (!) 88 %   07/22/20 1439    (!) 161/94 (!) 87 %     Oxygen Therapy  O2 Sat (%): 92 % (07/23/20 1231)  Pulse via Oximetry: 88 beats per minute (07/22/20 1559)  O2 Device: Room air (07/23/20 1231)  O2 Flow Rate (L/min): 2 l/min (07/23/20 0400)    Intake/Output Summary (Last 24 hours) at 7/23/2020 1428  Last data filed at 7/23/2020 0853  Gross per 24 hour   Intake 480 ml   Output 1250 ml   Net -770 ml         REVIEW OF SYSTEMS: Comprehensive ROS performed and negative except as stated in HPI. Physical Examination:  Physical Exam  Vitals signs and nursing note reviewed. Constitutional:       Appearance: She is obese. HENT:      Head: Normocephalic and atraumatic. Nose: Nose normal. No congestion. Mouth/Throat:      Pharynx: No oropharyngeal exudate. Eyes:      Conjunctiva/sclera: Conjunctivae normal.      Pupils: Pupils are equal, round, and reactive to light. Neck:      Musculoskeletal: No neck rigidity. Cardiovascular:      Rate and Rhythm: Normal rate and regular rhythm. Heart sounds: No gallop. Pulmonary:      Effort: No respiratory distress. Breath sounds: No wheezing. Abdominal:      General: Bowel sounds are normal.      Palpations: Abdomen is soft. Musculoskeletal:         General: No deformity or signs of injury. Lymphadenopathy:      Cervical: No cervical adenopathy. Skin:     General: Skin is warm and dry. Capillary Refill: Capillary refill takes less than 2 seconds. Neurological:      General: No focal deficit present. Mental Status: She is alert and oriented to person, place, and time. Psychiatric:         Thought Content: Thought content normal.         Data Review:  I have reviewed all labs, meds, telemetry events, and studies from the last 24 hours. Recent Results (from the past 24 hour(s))   METABOLIC PANEL, BASIC    Collection Time: 07/23/20  3:53 AM   Result Value Ref Range    Sodium 136 136 - 145 mmol/L    Potassium 3.5 3.5 - 5.1 mmol/L    Chloride 98 98 - 107 mmol/L    CO2 32 21 - 32 mmol/L    Anion gap 6 (L) 7 - 16 mmol/L    Glucose 130 (H) 65 - 100 mg/dL    BUN 24 (H) 6 - 23 MG/DL    Creatinine 0.98 0.6 - 1.0 MG/DL    GFR est AA >60 >60 ml/min/1.73m2    GFR est non-AA >60 >60 ml/min/1.73m2    Calcium 8.6 8.3 - 10.4 MG/DL   CBC WITH AUTOMATED DIFF    Collection Time: 07/23/20  3:53 AM   Result Value Ref Range    WBC 15.6 (H) 4.3 - 11.1 K/uL    RBC 4.37 4.05 - 5.2 M/uL    HGB 12.5 11.7 - 15.4 g/dL    HCT 39.4 35.8 - 46.3 %    MCV 90.2 79.6 - 97.8 FL    MCH 28.6 26.1 - 32.9 PG    MCHC 31.7 31.4 - 35.0 g/dL    RDW 15.7 (H) 11.9 - 14.6 %    PLATELET 260 302 - 285 K/uL    MPV 13.2 (H) 9.4 - 12.3 FL    ABSOLUTE NRBC 0.00 0.0 - 0.2 K/uL    DF AUTOMATED      NEUTROPHILS 76 43 - 78 %    LYMPHOCYTES 10 (L) 13 - 44 %    MONOCYTES 13 (H) 4.0 - 12.0 %    EOSINOPHILS 0 (L) 0.5 - 7.8 %    BASOPHILS 0 0.0 - 2.0 %    IMMATURE GRANULOCYTES 2 0.0 - 5.0 %    ABS. NEUTROPHILS 11.7 (H) 1.7 - 8.2 K/UL    ABS. LYMPHOCYTES 1.5 0.5 - 4.6 K/UL    ABS. MONOCYTES 2.0 (H) 0.1 - 1.3 K/UL    ABS. EOSINOPHILS 0.0 0.0 - 0.8 K/UL    ABS. BASOPHILS 0.1 0.0 - 0.2 K/UL    ABS. IMM.  GRANS. 0.3 0.0 - 0.5 K/UL   MAGNESIUM    Collection Time: 07/23/20  3:53 AM   Result Value Ref Range    Magnesium 2.4 1.8 - 2.4 mg/dL   HIV 1/2 AG/AB, 4TH GENERATION,W RFLX CONFIRM    Collection Time: 07/23/20  3:53 AM   Result Value Ref Range    HIV 1/2 Interpretation NONREACTIVE NR      HIV 1/2 result comment SEE NOTE (A) NR          All Micro Results     Procedure Component Value Units Date/Time    CULTURE, BLOOD [[id number]]  (Abnormal) Collected:  07/20/20 1828    Order Status:  Completed Specimen:  Blood Updated:  07/23/20 0737     Special Requests: --        RIGHT  HAND       GRAM STAIN       GRAM POS COCCI IN CLUSTERS                  AEROBIC AND ANAEROBIC BOTTLES                  RESULTS VERIFIED, PHONED TO AND READ BACK BY NADIA Her RN ON 7.21.2020 AT 09,             Culture result: STAPHYLOCOCCUS AUREUS               For Susceptibility Refer to Culture  [id number]      CULTURE, BLOOD [[id number]]  (Abnormal)  (Susceptibility) Collected:  07/20/20 1740    Order Status:  Completed Specimen:  Blood Updated:  07/23/20 0736     Special Requests: --        LEFT  HAND       GRAM STAIN       GRAM POS COCCI IN CLUSTERS                  AEROBIC AND ANAEROBIC BOTTLES                  RESULTS VERIFIED, PHONED TO AND READ BACK BY NADIA Her RN ON 7.21.2020 AT 0957,             Culture result: STAPHYLOCOCCUS AUREUS         REFER TO Fer Coffman [id number]     CULTURE, BLOOD [[id number]] Collected:  07/23/20 0353    Order Status:  Completed Specimen:  Blood Updated:  07/23/20 0442    CULTURE, BLOOD [[id number]] Collected:  07/23/20 0359    Order Status:  Completed Specimen:  Blood Updated:  07/23/20 0442    BLOOD CULTURE ID PANEL [[id number]]  (Abnormal) Collected:  07/20/20 1740    Order Status:  Completed Specimen:  Blood Updated:  07/21/20 1121     Acc. no. from Micro Order [id number]     Staphylococcus Detected        Staphylococcus aureus Detected        Comment: RESULTS VERIFIED, PHONED TO AND READ BACK BY  NADIA Her RN ON 7/21/20 @09,           mecA (Methicillin-Resistance Genes) NOT DETECTED        INTERPRETATION       Gram positive cocci in clusters, identified in realtime PCR as probable MSSA.            Comment: Recommend discontinuing IV vancomycin starting cefazolin or nafcillin if patient not on beta-lactam therapy. Infectious Diseases Consult recommended in adult patients. THIS TEST DOES NOT REPLACE SENSITIVITY TESTING.              Current Meds:  Current Facility-Administered Medications   Medication Dose Route Frequency    potassium chloride (K-DUR, KLOR-CON) SR tablet 40 mEq  40 mEq Oral NOW    polyethylene glycol (MIRALAX) packet 17 g  17 g Oral DAILY    polyethylene glycol (MIRALAX) packet 17 g  17 g Oral TID    docusate sodium (COLACE) capsule 100 mg  100 mg Oral DAILY    ceFAZolin (ANCEF) 2 g/20 mL in sterile water IV syringe  2 g IntraVENous Q8H    midazolam (VERSED) injection 0.5-2 mg  0.5-2 mg IntraVENous Multiple    fentaNYL citrate (PF) injection 25-50 mcg  25-50 mcg IntraVENous Multiple    lidocaine (XYLOCAINE) 2 % viscous solution 15 mL  15 mL Mouth/Throat PRN    traMADoL (ULTRAM) tablet 50 mg  50 mg Oral Q6H PRN    hydrALAZINE (APRESOLINE) tablet 10 mg  10 mg Oral Q6H PRN    potassium chloride (K-DUR, KLOR-CON) SR tablet 20 mEq  20 mEq Oral DAILY    alum-mag hydroxide-simeth (MYLANTA) oral suspension 30 mL  30 mL Oral Q4H PRN    lisinopril-hydroCHLOROthiazide (PRINZIDE, ZESTORETIC) 20-25 mg per tablet 1 Tab  1 Tab Oral DAILY    sodium chloride (NS) flush 5-40 mL  5-40 mL IntraVENous Q8H    sodium chloride (NS) flush 5-40 mL  5-40 mL IntraVENous PRN    acetaminophen (TYLENOL) tablet 650 mg  650 mg Oral Q4H PRN    oxyCODONE-acetaminophen (PERCOCET) 5-325 mg per tablet 1 Tab  1 Tab Oral Q4H PRN    morphine injection 1 mg  1 mg IntraVENous Q4H PRN    naloxone (NARCAN) injection 0.4 mg  0.4 mg IntraVENous PRN    diphenhydrAMINE (BENADRYL) injection 12.5 mg  12.5 mg IntraVENous Q4H PRN    ondansetron (ZOFRAN) injection 4 mg  4 mg IntraVENous Q4H PRN    bisacodyL (DULCOLAX) tablet 5 mg  5 mg Oral DAILY PRN    magnesium oxide (MAG-OX) tablet 400 mg  400 mg Oral BID       Diet:  DIET CARDIAC    Other Studies (last 24 hours):  No results found. Assessment and Plan:     Hospital Problems as of 7/23/2020 Never Reviewed          Codes Class Noted - Resolved POA    Constipation ICD-10-CM: K59.00  ICD-9-CM: 564.00  7/22/2020 - Present Unknown        Nausea and vomiting ICD-10-CM: R11.2  ICD-9-CM: 787.01  7/22/2020 - Present Unknown        * (Principal) Sepsis due to methicillin susceptible Staphylococcus aureus (MSSA) without acute organ dysfunction ([facility] ICD-10-CM: A41.01  ICD-9-CM: 038.11, 995.91  7/22/2020 - Present Unknown        Bacteremia ICD-10-CM: R78.81  ICD-9-CM: 790.7  7/21/2020 - Present Unknown        Hypokalemia ICD-10-CM: E87.6  ICD-9-CM: 276.8  7/20/2020 - Present Unknown        HTN (hypertension), malignant ICD-10-CM: I10  ICD-9-CM: 401.0  7/20/2020 - Present Unknown        Arthritis ICD-10-CM: M19.90  ICD-9-CM: 716.90  7/20/2020 - Present Unknown        Morbidly obese ([facility] ICD-10-CM: E66.01  ICD-9-CM: 278.01  7/20/2020 - Present Unknown        Adrenal adenoma, right ICD-10-CM: D35.01  ICD-9-CM: 227.0  7/20/2020 - Present Unknown    Overview Signed 7/20/2020  2:53 PM by Saurav Gallardo MD     Benign fat-containing 5.5 cm right adrenal adenoma             SIRS (systemic inflammatory response syndrome) ([facility] ICD-10-CM: R65.10  ICD-9-CM: 995.90  7/20/2020 - Present Unknown              A/P:      --Sepsis due to MSSA not complicated by organ dysfunctionappreciate ID recommendations regarding duration of therapy and held picc placement until 48hr repeat culture negative. DREA was negative for any vegetations. Likely uncomplicated staph aureus sepsis likely will need 14-day IV antibiotics. Case management consult regarding home IV antibiotics. Needs assistance.   Infectious disease management/recommendations appreciated    --- Malignant hypertension--resolved with resumption of home ACE/thiazide diuretic combination with magnesium supplementation.  -Recurrent hypokalemiasupplement and maintain daily potassium for goal of serum potassium 4.0.    -Adrenal adenoma-Outpatient follow-up imagingbenign appearing comment regarding fat-containingunchanged    Scapular pain very likely musculoskeletal related to spasm--resolvedavoid narcoticshe tolerated poorly with nausea and vomiting. DC planning/Dispo: Pending recommended duration IV antibiotics  DVT ppx: Subcutaneous heparin    Code status: Full code  Medical decision maker: Selffamily contacts on file      Signed:  Kun BISHOP

## 2020-07-23 NOTE — ROUTINE PROCESS
Bedside and Verbal shift change report given to Gucci García RN (oncoming nurse) by myself (offgoing nurse). Report included the following information SBAR, Kardex, MAR and Recent Results.

## 2020-07-23 NOTE — ROUTINE PROCESS
Bedside and verbal shift change report given to Timoteo Carr RN (oncoming nurse) by self Ferdinand trevino). Report included the following information SBAR, Kardex, Intake/Output, MAR, Recent Results.

## 2020-07-23 NOTE — PROGRESS NOTES
Spoke to PICC team this AM, they said per ID, they are waiting for blood cx to come back negative in order for the PICC line to be placed. I told them I'd call if anything changed today.

## 2020-07-23 NOTE — PROGRESS NOTES
Care Management Interventions  PCP Verified by CM: Yes(Seen at [facility] 3 months ago)  Mode of Transport at Discharge: Other (see comment)(Rajan Nicholson  Other Relative  [phone]  )  Transition of Care Consult (CM Consult): Discharge Planning, [address]: Yes  Discharge Durable Medical Equipment: No  Physical Therapy Consult: No  Occupational Therapy Consult: No  Current Support Network: Other(Her 2 sons live with her in a house)  Confirm Follow Up Transport: Family  The Plan for Transition of Care is Related to the Following Treatment Goals : Home health  The Patient and/or Patient Representative was Provided with a Choice of Provider and Agrees with the Discharge Plan?: Yes  Name of the Patient Representative Who was Provided with a Choice of Provider and Agrees with the Discharge Plan: Patient  Freedom of Choice List was Provided with Basic Dialogue that Supports the Patient's Individualized Plan of Care/Goals, Treatment Preferences and Shares the Quality Data Associated with the Providers?: Yes  Discharge Location  Discharge Placement: Home with home health    CM received referral for IV ABX 2 week home therapy  Pt is self pay. CM sent referral to [facility] for cost estimate of antibiotic and supplies. CM will send referral at discharge for [facility] for PICC maintenance. CM to continue to follow.

## 2020-07-24 ENCOUNTER — APPOINTMENT (OUTPATIENT)
Dept: MRI IMAGING | Age: 60
DRG: 872 | End: 2020-07-24
Attending: EMERGENCY MEDICINE
Payer: SUBSIDIZED

## 2020-07-24 ENCOUNTER — APPOINTMENT (OUTPATIENT)
Dept: MRI IMAGING | Age: 60
DRG: 872 | End: 2020-07-24
Attending: INTERNAL MEDICINE
Payer: SUBSIDIZED

## 2020-07-24 LAB
ANION GAP SERPL CALC-SCNC: 4 MMOL/L (ref 7–16)
BUN SERPL-MCNC: 20 MG/DL (ref 6–23)
CALCIUM SERPL-MCNC: 9.2 MG/DL (ref 8.3–10.4)
CHLORIDE SERPL-SCNC: 98 MMOL/L (ref 98–107)
CO2 SERPL-SCNC: 35 MMOL/L (ref 21–32)
CREAT SERPL-MCNC: 0.85 MG/DL (ref 0.6–1)
CRP SERPL-MCNC: 19.4 MG/DL (ref 0–0.9)
DIFFERENTIAL METHOD BLD: ABNORMAL
ERYTHROCYTE [DISTWIDTH] IN BLOOD BY AUTOMATED COUNT: 51 %
ERYTHROCYTE [SEDIMENTATION RATE] IN BLOOD: 77 MM/HR (ref 0–30)
GLUCOSE SERPL-MCNC: 122 MG/DL (ref 65–100)
HAV IGM SERPL QL IA: NEGATIVE
HBV CORE IGM SERPL QL IA: NEGATIVE
HBV SURFACE AG SERPL QL IA: NEGATIVE
HCT VFR BLD AUTO: 38.4 % (ref 35.8–46.3)
HCV AB S/CO SERPL IA: <0.1 S/CO RATIO (ref 0–0.9)
HGB BLD-MCNC: 12.4 G/DL (ref 11.7–15.4)
LYMPHOCYTES # BLD: 2.7 K/UL (ref 0.5–4.6)
LYMPHOCYTES NFR BLD MANUAL: 13 % (ref 16–44)
MAGNESIUM SERPL-MCNC: 2.5 MG/DL (ref 1.8–2.4)
MCH RBC QN AUTO: 28.6 PG (ref 26.1–32.9)
MCHC RBC AUTO-ENTMCNC: 32.3 G/DL (ref 31.4–35)
MCV RBC AUTO: 88.5 FL (ref 79.6–97.8)
MONOCYTES # BLD: 2.5 K/UL (ref 0.1–1.3)
MONOCYTES NFR BLD MANUAL: 12 % (ref 3–9)
NEUTS BAND NFR BLD MANUAL: 2 % (ref 0–10)
NEUTS SEG # BLD: 15.3 K/UL (ref 1.7–8.2)
NEUTS SEG NFR BLD MANUAL: 73 % (ref 47–75)
PLATELET # BLD AUTO: 229 K/UL
PLATELET COMMENTS,PCOM: ADEQUATE
PMV BLD AUTO: 13 FL (ref 9.4–12.3)
POTASSIUM SERPL-SCNC: 3.3 MMOL/L (ref 3.5–5.1)
RBC # BLD AUTO: 4.34 M/UL
RBC MORPH BLD: ABNORMAL
SODIUM SERPL-SCNC: 137 MMOL/L (ref 136–145)
WBC # BLD AUTO: 20.5 K/UL (ref 4.3–11.1)

## 2020-07-24 PROCEDURE — 72158 MRI LUMBAR SPINE W/O & W/DYE: CPT

## 2020-07-24 PROCEDURE — 80048 BASIC METABOLIC PNL TOTAL CA: CPT

## 2020-07-24 PROCEDURE — 83735 ASSAY OF MAGNESIUM: CPT

## 2020-07-24 PROCEDURE — 74011250637 HC RX REV CODE- 250/637: Performed by: INTERNAL MEDICINE

## 2020-07-24 PROCEDURE — 36415 COLL VENOUS BLD VENIPUNCTURE: CPT

## 2020-07-24 PROCEDURE — 86140 C-REACTIVE PROTEIN: CPT

## 2020-07-24 PROCEDURE — 85025 COMPLETE CBC W/AUTO DIFF WBC: CPT

## 2020-07-24 PROCEDURE — 74011000250 HC RX REV CODE- 250: Performed by: INTERNAL MEDICINE

## 2020-07-24 PROCEDURE — 85652 RBC SED RATE AUTOMATED: CPT

## 2020-07-24 PROCEDURE — 74011250637 HC RX REV CODE- 250/637: Performed by: FAMILY MEDICINE

## 2020-07-24 PROCEDURE — A9575 INJ GADOTERATE MEGLUMI 0.1ML: HCPCS | Performed by: INTERNAL MEDICINE

## 2020-07-24 PROCEDURE — 74011250636 HC RX REV CODE- 250/636: Performed by: FAMILY MEDICINE

## 2020-07-24 PROCEDURE — 72157 MRI CHEST SPINE W/O & W/DYE: CPT

## 2020-07-24 PROCEDURE — 74011250636 HC RX REV CODE- 250/636: Performed by: INTERNAL MEDICINE

## 2020-07-24 PROCEDURE — 65270000029 HC RM PRIVATE

## 2020-07-24 PROCEDURE — 74011250637 HC RX REV CODE- 250/637: Performed by: HOSPITALIST

## 2020-07-24 RX ORDER — GADOTERATE MEGLUMINE 376.9 MG/ML
23 INJECTION INTRAVENOUS
Status: COMPLETED | OUTPATIENT
Start: 2020-07-24 | End: 2020-07-24

## 2020-07-24 RX ORDER — SODIUM CHLORIDE 0.9 % (FLUSH) 0.9 %
10 SYRINGE (ML) INJECTION
Status: COMPLETED | OUTPATIENT
Start: 2020-07-24 | End: 2020-07-24

## 2020-07-24 RX ORDER — POTASSIUM CHLORIDE 20 MEQ/1
40 TABLET, EXTENDED RELEASE ORAL ONCE
Status: COMPLETED | OUTPATIENT
Start: 2020-07-24 | End: 2020-07-24

## 2020-07-24 RX ORDER — KETOROLAC TROMETHAMINE 30 MG/ML
30 INJECTION, SOLUTION INTRAMUSCULAR; INTRAVENOUS
Status: DISPENSED | OUTPATIENT
Start: 2020-07-24 | End: 2020-07-27

## 2020-07-24 RX ORDER — PANTOPRAZOLE SODIUM 40 MG/1
40 TABLET, DELAYED RELEASE ORAL
Status: DISCONTINUED | OUTPATIENT
Start: 2020-07-25 | End: 2020-07-28 | Stop reason: HOSPADM

## 2020-07-24 RX ORDER — ORPHENADRINE CITRATE 100 MG/1
100 TABLET, EXTENDED RELEASE ORAL 2 TIMES DAILY
Status: DISCONTINUED | OUTPATIENT
Start: 2020-07-24 | End: 2020-07-28 | Stop reason: HOSPADM

## 2020-07-24 RX ORDER — SODIUM CHLORIDE 9 MG/ML
50 INJECTION, SOLUTION INTRAVENOUS CONTINUOUS
Status: DISCONTINUED | OUTPATIENT
Start: 2020-07-24 | End: 2020-07-27

## 2020-07-24 RX ADMIN — DOCUSATE SODIUM 100 MG: 100 CAPSULE, LIQUID FILLED ORAL at 09:42

## 2020-07-24 RX ADMIN — POTASSIUM CHLORIDE 40 MEQ: 20 TABLET, EXTENDED RELEASE ORAL at 12:45

## 2020-07-24 RX ADMIN — MORPHINE SULFATE 1 MG: 2 INJECTION, SOLUTION INTRAMUSCULAR; INTRAVENOUS at 07:56

## 2020-07-24 RX ADMIN — MAGNESIUM GLUCONATE 500 MG ORAL TABLET 400 MG: 500 TABLET ORAL at 17:22

## 2020-07-24 RX ADMIN — Medication 10 ML: at 20:54

## 2020-07-24 RX ADMIN — SODIUM CHLORIDE 75 ML/HR: 9 INJECTION, SOLUTION INTRAVENOUS at 09:58

## 2020-07-24 RX ADMIN — KETOROLAC TROMETHAMINE 30 MG: 30 INJECTION, SOLUTION INTRAMUSCULAR at 14:15

## 2020-07-24 RX ADMIN — Medication 5 ML: at 06:47

## 2020-07-24 RX ADMIN — POTASSIUM CHLORIDE 20 MEQ: 20 TABLET, EXTENDED RELEASE ORAL at 09:43

## 2020-07-24 RX ADMIN — KETOROLAC TROMETHAMINE 30 MG: 30 INJECTION, SOLUTION INTRAMUSCULAR at 20:52

## 2020-07-24 RX ADMIN — ORPHENADRINE CITRATE 100 MG: 100 TABLET, EXTENDED RELEASE ORAL at 21:53

## 2020-07-24 RX ADMIN — MAGNESIUM GLUCONATE 500 MG ORAL TABLET 400 MG: 500 TABLET ORAL at 09:42

## 2020-07-24 RX ADMIN — MORPHINE SULFATE 1 MG: 2 INJECTION, SOLUTION INTRAMUSCULAR; INTRAVENOUS at 16:02

## 2020-07-24 RX ADMIN — HYDRALAZINE HYDROCHLORIDE 10 MG: 10 TABLET, FILM COATED ORAL at 04:30

## 2020-07-24 RX ADMIN — Medication 10 ML: at 14:12

## 2020-07-24 RX ADMIN — POLYETHYLENE GLYCOL 3350 17 G: 17 POWDER, FOR SOLUTION ORAL at 21:53

## 2020-07-24 RX ADMIN — TRAMADOL HYDROCHLORIDE 50 MG: 50 TABLET, FILM COATED ORAL at 04:30

## 2020-07-24 RX ADMIN — LISINOPRIL AND HYDROCHLOROTHIAZIDE 1 TABLET: 25; 20 TABLET ORAL at 09:42

## 2020-07-24 RX ADMIN — ORPHENADRINE CITRATE 100 MG: 100 TABLET, EXTENDED RELEASE ORAL at 10:28

## 2020-07-24 RX ADMIN — GADOTERATE MEGLUMINE 23 ML: 376.9 INJECTION INTRAVENOUS at 19:11

## 2020-07-24 RX ADMIN — MORPHINE SULFATE 1 MG: 2 INJECTION, SOLUTION INTRAMUSCULAR; INTRAVENOUS at 01:43

## 2020-07-24 RX ADMIN — Medication 10 ML: at 19:11

## 2020-07-24 RX ADMIN — CEFAZOLIN SODIUM 2 G: 100 INJECTION, POWDER, LYOPHILIZED, FOR SOLUTION INTRAVENOUS at 17:22

## 2020-07-24 RX ADMIN — MORPHINE SULFATE 1 MG: 2 INJECTION, SOLUTION INTRAMUSCULAR; INTRAVENOUS at 12:45

## 2020-07-24 RX ADMIN — CEFAZOLIN SODIUM 2 G: 100 INJECTION, POWDER, LYOPHILIZED, FOR SOLUTION INTRAVENOUS at 09:54

## 2020-07-24 RX ADMIN — TRAMADOL HYDROCHLORIDE 50 MG: 50 TABLET, FILM COATED ORAL at 10:28

## 2020-07-24 RX ADMIN — CEFAZOLIN SODIUM 2 G: 100 INJECTION, POWDER, LYOPHILIZED, FOR SOLUTION INTRAVENOUS at 02:00

## 2020-07-24 NOTE — ROUTINE PROCESS
Bedside and Verbal shift change report given to Azalia Cook (oncoming nurse) by Mark ROBERTO (offgoing nurse). Report included the following information SBAR, Kardex, Intake/Output, MAR and Recent Results.

## 2020-07-24 NOTE — PROGRESS NOTES
Infectious Disease Progress Note    Today's Date: 2020   Admit Date: 2020    Impression:   · MSSA bacteremia (), TTE/DREA negative, source not clear, but does have a habit of picking her skin. Repeat [facility]  NTD pending   · Upper back pain, CT c/a/p negative-intermittent  · Nausea/vomiting, intermittent  · Leukocytosis, worsened  · HTN    Plan:     · Uncertain what is driving WBC up; check ESR/CRP. Back pain in between shoulder blades has returned, MRI has been ordered-follow  · Continue Cefazolin, anticipating 2 weeks from negative BC; PICC placement once these are negative at 48 hrs  · Dispo: will need CM assistance, pt is unfunded and will need IV antbx; Dalbavancin is NOT an option with bacteremia       Will check chart, otherwise will plan to follow patient on Monday unless called to see sooner. Anti-infectives:   · Cefazolin -  · vanc -  · CTX -    Subjective:   Sitting up in the chair, reports pain returned to a point where she had to start taking Morphine. No fever, diarrhea, nausea is controlled. Allergies   Allergen Reactions    Lortab [Hydrocodone-Acetaminophen] Nausea and Vomiting        Review of Systems:  A comprehensive review of systems was negative except for that written in the History of Present Illness.     Objective:     Visit Vitals  BP (!) 169/96 (BP 1 Location: Right arm)   Pulse 91   Temp 97.8 °F (36.6 °C)   Resp 18   Ht 5' 4\" (1.626 m)   Wt 116.8 kg (257 lb 9.6 oz)   SpO2 93%   BMI 44.22 kg/m²     Temp (24hrs), Av.2 °F (36.8 °C), Min:97.7 °F (36.5 °C), Max:98.8 °F (37.1 °C)     General:  Alert, cooperative, no acute distress, appears stated age, morbidly obese   Head:  Normocephalic, atraumatic    Eyes:  Anicteric, no drainage, not injected, EOMI   Throat: Mucus membranes moist OP clear   Lungs:   Clear throughout lung fields without increased work of breathing or audible wheezes   Heart:  Regular rate and rhythm, without audible murmur, rub, or gallop   Abdomen:   Soft, non-tender, no guarding, no distention, bowel sounds active   Extremities: Extremities normal, atraumatic, no cyanosis or edema. No obvious outward changes between shoulder blades   Pulses: 2+ and symmetric   Skin: Skin color, texture, turgor normal, no rashes or lesions. Lines/Devices: PIV         Data Review:     CBC:  Recent Labs     07/24/20 0330 07/23/20  0353 07/22/20  0409   WBC 20.5* 15.6* 14.6*   GRANS 73 76 79*   MONOS 12* 13* 11   EOS  --  0* 0*   ANEU 15.3* 11.7* 11.6*   ABL 2.7 1.5 1.2   HGB 12.4 12.5 12.5   HCT 38.4 39.4 37.9    192 171       BMP:  Recent Labs     07/24/20 0330 07/23/20 0353 07/22/20  0409   CREA 0.85 0.98 1.19*   BUN 20 24* 27*    136 136   K 3.3* 3.5 3.4*   CL 98 98 100   CO2 35* 32 28   AGAP 4* 6* 8   * 130* 118*       LFTS:  No results for input(s): TBILI, ALT, AP, TP, ALB in the last 72 hours.     No lab exists for component: SGOT    Microbiology:     All Micro Results     Procedure Component Value Units Date/Time    CULTURE, BLOOD [[id number]]  (Abnormal) Collected:  07/20/20 1828    Order Status:  Completed Specimen:  Blood Updated:  07/23/20 0737     Special Requests: --        RIGHT  HAND       GRAM STAIN       GRAM POS COCCI IN CLUSTERS                  AEROBIC AND ANAEROBIC BOTTLES                  RESULTS VERIFIED, PHONED TO AND READ BACK BY [employer] [address] [place] [address] ON 7.21.2020 AT 0957,             Culture result: STAPHYLOCOCCUS AUREUS               For Susceptibility Refer to Culture  [id number]      CULTURE, BLOOD [[id number]]  (Abnormal)  (Susceptibility) Collected:  07/20/20 1740    Order Status:  Completed Specimen:  Blood Updated:  07/23/20 0736     Special Requests: --        LEFT  HAND       GRAM STAIN       GRAM POS COCCI IN CLUSTERS                  AEROBIC AND ANAEROBIC BOTTLES                  RESULTS VERIFIED, PHONED TO AND READ BACK BY NADIA [address] [place] [address] , RN ON 7.21.2020 AT 0957,             Culture result: STAPHYLOCOCCUS AUREUS         REFER TO Pj Nicole [id number]     CULTURE, BLOOD [[id number]] Collected:  07/23/20 0353    Order Status:  Completed Specimen:  Blood Updated:  07/23/20 0442    CULTURE, BLOOD [[id number]] Collected:  07/23/20 0359    Order Status:  Completed Specimen:  Blood Updated:  07/23/20 0442    BLOOD CULTURE ID PANEL [[id number]]  (Abnormal) Collected:  07/20/20 1740    Order Status:  Completed Specimen:  Blood Updated:  07/21/20 1121     Acc. no. from Micro Order [id number]     Staphylococcus Detected        Staphylococcus aureus Detected        Comment: RESULTS VERIFIED, PHONED TO AND READ BACK BY  NADIA Her RN ON 7/21/20 @0957,           mecA (Methicillin-Resistance Genes) NOT DETECTED        INTERPRETATION       Gram positive cocci in clusters, identified in realtime PCR as probable MSSA. Comment: Recommend discontinuing IV vancomycin starting cefazolin or nafcillin if patient not on beta-lactam therapy. Infectious Diseases Consult recommended in adult patients. THIS TEST DOES NOT REPLACE SENSITIVITY TESTING.              Imaging:   Reviewed    Signed By: Reji Salter NP     July 24, 2020

## 2020-07-24 NOTE — PROGRESS NOTES
Hospitalist Progress Note     Admit Date:  2020  7:36 AM   Name:  Gilbert Hughes   Age:  64 y.o.  :  1960   MRN:  [id number]   PCP:  Gary Rothman MD  Treatment Team: Attending Provider: Ivette Bianchi DO; Care Manager: Miky Clay, RN; Utilization Review: Edwena Romberg, RN; Consulting Provider: Franco Pinto NP; Consulting Provider: Shiloh Alberto MD    Subjective:   HPI and or CC:  From history and physical HPI:  64 yr old  female patient who follows up with [facility], off her blood pressure medication for past 1 week. Known history of hypertension, morbid obesity, arthritis, sciatica and spine issues.     Pt says she is off blood pressure meds for a week. Since past 4 days started having sharp pain rt shoulder blade , then both shoulder blade region, constant, moderate intensity, not improving even after several doses of nsaid. Says also has sciatica pain more on the rt lower extremity. Started having vomiting, non bilious , about 7 episodes since last night. Dizziness, on and off since past 4 days ,since her blood pressures have been up.     Pt otherwise doesn't c/o head or shortness of breath or any left sided chest pain.     Wbc 19,k 3, bp 219/117  CT OF THE CHEST ABDOMEN AND PELVIS  1) Trace right pleural fusion. 2) Benign fat-containing 5.5 cm right adrenal adenoma.     Pt presently on cardene drip for elevated bp.     Pt will be admitted to ICU for malignant htn, hypokalemia and does meet the criteria for SIRS    :  Chief complaint : Thoracic mid scapular back pain    Patient's musculoskeletal pain has returned and significant. Her white blood count also has increased. Repeat sed rate pending. Discussed MRI, discussed K pad, Toradol and oral muscle relaxers. Minimize narcotics as able. Evaluate for spinal infectious process but no point tenderness is obvious.       Objective:     Patient Vitals for the past 24 hrs:   Temp Pulse Resp BP SpO2   07/24/20 1308 97.9 °F (36.6 °C) 82 18 (!) 163/92 93 %   07/24/20 0859 98.3 °F (36.8 °C) 82 18 (!) 158/100 93 %   07/24/20 0411 97.8 °F (36.6 °C) 91 18 (!) 169/96 93 %   07/24/20 0049 97.7 °F (36.5 °C) 89 16 (!) 154/99 93 %   07/23/20 2213 97.9 °F (36.6 °C) 80 18 165/82 92 %   07/23/20 1716 98.8 °F (37.1 °C) 88 20 146/82 94 %     Oxygen Therapy  O2 Sat (%): 93 % (07/24/20 1308)  Pulse via Oximetry: 88 beats per minute (07/22/20 1559)  O2 Device: Room air (07/24/20 1308)  O2 Flow Rate (L/min): 2 l/min (07/23/20 0400)    Intake/Output Summary (Last 24 hours) at 7/24/2020 1531  Last data filed at 7/24/2020 1415  Gross per 24 hour   Intake 180 ml   Output 1050 ml   Net -870 ml         REVIEW OF SYSTEMS: Comprehensive ROS performed and negative except as stated in HPI. Physical Examination:  Physical Exam  Vitals signs and nursing note reviewed. Constitutional:       Appearance: She is not toxic-appearing or diaphoretic. HENT:      Nose: No rhinorrhea. Mouth/Throat:      Mouth: Mucous membranes are moist.   Eyes:      General: No scleral icterus. Extraocular Movements: Extraocular movements intact. Neck:      Musculoskeletal: Neck supple. No muscular tenderness. Cardiovascular:      Heart sounds: No murmur. No friction rub. Pulmonary:      Effort: Pulmonary effort is normal.      Breath sounds: No rales. Abdominal:      General: There is no distension. Tenderness: There is no abdominal tenderness. Musculoskeletal: Normal range of motion. General: No tenderness. Comments: Mid scapular back pain has recurred no obvious point tenderness over T-spine   Skin:     Capillary Refill: Capillary refill takes less than 2 seconds. Findings: No erythema or rash. Neurological:      Cranial Nerves: No cranial nerve deficit. Motor: No weakness.    Psychiatric:         Behavior: Behavior normal.         Judgment: Judgment normal.         Data Review:  I have reviewed all labs, meds, telemetry events, and studies from the last 24 hours. Recent Results (from the past 24 hour(s))   MAGNESIUM    Collection Time: 07/24/20  3:30 AM   Result Value Ref Range    Magnesium 2.5 (H) 1.8 - 2.4 mg/dL   METABOLIC PANEL, BASIC    Collection Time: 07/24/20  3:30 AM   Result Value Ref Range    Sodium 137 136 - 145 mmol/L    Potassium 3.3 (L) 3.5 - 5.1 mmol/L    Chloride 98 98 - 107 mmol/L    CO2 35 (H) 21 - 32 mmol/L    Anion gap 4 (L) 7 - 16 mmol/L    Glucose 122 (H) 65 - 100 mg/dL    BUN 20 6 - 23 MG/DL    Creatinine 0.85 0.6 - 1.0 MG/DL    GFR est AA >60 >60 ml/min/1.73m2    GFR est non-AA >60 >60 ml/min/1.73m2    Calcium 9.2 8.3 - 10.4 MG/DL   CBC WITH AUTOMATED DIFF    Collection Time: 07/24/20  3:30 AM   Result Value Ref Range    WBC 20.5 (H) 4.3 - 11.1 K/uL    RBC 4.34 M/uL    HGB 12.4 11.7 - 15.4 g/dL    HCT 38.4 35.8 - 46.3 %    MCV 88.5 79.6 - 97.8 FL    MCH 28.6 26.1 - 32.9 PG    MCHC 32.3 31.4 - 35.0 g/dL    RDW 51.0 %    PLATELET 079 K/uL    MPV 13.0 (H) 9.4 - 12.3 FL    DF AUTOMATED      NEUTROPHILS 73 47 - 75 %    BAND NEUTROPHILS 2 0 - 10 %    LYMPHOCYTES 13 (L) 16 - 44 %    MONOCYTES 12 (H) 3 - 9 %    ABS. NEUTROPHILS 15.3 (H) 1.7 - 8.2 K/UL    ABS. LYMPHOCYTES 2.7 0.5 - 4.6 K/UL    ABS.  MONOCYTES 2.5 (H) 0.1 - 1.3 K/UL    RBC COMMENTS NORMOCYTIC/NORMOCHROMIC      PLATELET COMMENTS ADEQUATE     C REACTIVE PROTEIN, QT    Collection Time: 07/24/20  3:30 AM   Result Value Ref Range    C-Reactive protein 19.4 (H) 0.0 - 0.9 mg/dL   SED RATE, AUTOMATED    Collection Time: 07/24/20 10:19 AM   Result Value Ref Range    Sed rate, automated 77 (H) 0 - 30 mm/hr        All Micro Results     Procedure Component Value Units Date/Time    CULTURE, BLOOD [[id number]] Collected:  07/23/20 0353    Order Status:  Completed Specimen:  Blood Updated:  07/24/20 1317     Special Requests: --        LEFT  HAND       Culture result: NO GROWTH 1 DAY       CULTURE, BLOOD [[id number]] Collected:  07/23/20 0359    Order Status:  Completed Specimen:  Blood Updated:  07/24/20 1317     Special Requests: --        RIGHT  HAND       Culture result: NO GROWTH 1 DAY       CULTURE, BLOOD [[id number]]  (Abnormal) Collected:  07/20/20 1828    Order Status:  Completed Specimen:  Blood Updated:  07/23/20 0737     Special Requests: --        RIGHT  HAND       GRAM STAIN       GRAM POS COCCI IN CLUSTERS                  AEROBIC AND ANAEROBIC BOTTLES                  RESULTS VERIFIED, PHONED TO AND READ BACK BY NADIA Her RN ON 7.21.2020 AT 0957,             Culture result: STAPHYLOCOCCUS AUREUS               For Susceptibility Refer to Culture  [id number]      CULTURE, BLOOD [[id number]]  (Abnormal)  (Susceptibility) Collected:  07/20/20 1740    Order Status:  Completed Specimen:  Blood Updated:  07/23/20 0736     Special Requests: --        LEFT  HAND       GRAM STAIN       GRAM POS COCCI IN CLUSTERS                  AEROBIC AND ANAEROBIC BOTTLES                  RESULTS VERIFIED, PHONED TO AND READ BACK BY NADIA Her RN ON 7.21.2020 AT 0957,             Culture result: STAPHYLOCOCCUS AUREUS         REFER TO Saurabh Gonsalez Dr ACCESSION [id number]     BLOOD CULTURE ID PANEL [[id number]]  (Abnormal) Collected:  07/20/20 1740    Order Status:  Completed Specimen:  Blood Updated:  07/21/20 1121     Acc. no. from Micro Order [id number]     Staphylococcus Detected        Staphylococcus aureus Detected        Comment: RESULTS VERIFIED, PHONED TO AND READ BACK BY  NADIA Her RN ON 7/21/20 @0957,           mecA (Methicillin-Resistance Genes) NOT DETECTED        INTERPRETATION       Gram positive cocci in clusters, identified in realtime PCR as probable MSSA. Comment: Recommend discontinuing IV vancomycin starting cefazolin or nafcillin if patient not on beta-lactam therapy. Infectious Diseases Consult recommended in adult patients. THIS TEST DOES NOT REPLACE SENSITIVITY TESTING. Current Meds:  Current Facility-Administered Medications   Medication Dose Route Frequency    0.9% sodium chloride infusion  75 mL/hr IntraVENous CONTINUOUS    ketorolac (TORADOL) injection 30 mg  30 mg IntraVENous Q6H PRN    [START ON 7/25/2020] pantoprazole (PROTONIX) tablet 40 mg  40 mg Oral ACB    orphenadrine citrate (NORFLEX) sr tablet 100 mg  100 mg Oral BID    polyethylene glycol (MIRALAX) packet 17 g  17 g Oral DAILY    polyethylene glycol (MIRALAX) packet 17 g  17 g Oral TID    docusate sodium (COLACE) capsule 100 mg  100 mg Oral DAILY    ceFAZolin (ANCEF) 2 g/20 mL in sterile water IV syringe  2 g IntraVENous Q8H    lidocaine (XYLOCAINE) 2 % viscous solution 15 mL  15 mL Mouth/Throat PRN    traMADoL (ULTRAM) tablet 50 mg  50 mg Oral Q6H PRN    hydrALAZINE (APRESOLINE) tablet 10 mg  10 mg Oral Q6H PRN    potassium chloride (K-DUR, KLOR-CON) SR tablet 20 mEq  20 mEq Oral DAILY    alum-mag hydroxide-simeth (MYLANTA) oral suspension 30 mL  30 mL Oral Q4H PRN    lisinopril-hydroCHLOROthiazide (PRINZIDE, ZESTORETIC) 20-25 mg per tablet 1 Tab  1 Tab Oral DAILY    sodium chloride (NS) flush 5-40 mL  5-40 mL IntraVENous Q8H    sodium chloride (NS) flush 5-40 mL  5-40 mL IntraVENous PRN    acetaminophen (TYLENOL) tablet 650 mg  650 mg Oral Q4H PRN    oxyCODONE-acetaminophen (PERCOCET) 5-325 mg per tablet 1 Tab  1 Tab Oral Q4H PRN    morphine injection 1 mg  1 mg IntraVENous Q4H PRN    naloxone (NARCAN) injection 0.4 mg  0.4 mg IntraVENous PRN    diphenhydrAMINE (BENADRYL) injection 12.5 mg  12.5 mg IntraVENous Q4H PRN    ondansetron (ZOFRAN) injection 4 mg  4 mg IntraVENous Q4H PRN    bisacodyL (DULCOLAX) tablet 5 mg  5 mg Oral DAILY PRN    magnesium oxide (MAG-OX) tablet 400 mg  400 mg Oral BID       Diet:  DIET CARDIAC    Other Studies (last 24 hours):  No results found.     Assessment and Plan:     Hospital Problems as of 7/24/2020 Never Reviewed          Codes Class Noted - Resolved POA    Constipation ICD-10-CM: K59.00  ICD-9-CM: 564.00  7/22/2020 - Present Unknown        Nausea and vomiting ICD-10-CM: R11.2  ICD-9-CM: 787.01  7/22/2020 - Present Unknown        * (Principal) Sepsis due to methicillin susceptible Staphylococcus aureus (MSSA) without acute organ dysfunction (HCC) ICD-10-CM: A41.01  ICD-9-CM: 038.11, 995.91  7/22/2020 - Present Unknown        Bacteremia ICD-10-CM: R78.81  ICD-9-CM: 790.7  7/21/2020 - Present Unknown        Hypokalemia ICD-10-CM: E87.6  ICD-9-CM: 276.8  7/20/2020 - Present Unknown        HTN (hypertension), malignant ICD-10-CM: I10  ICD-9-CM: 401.0  7/20/2020 - Present Unknown        Arthritis ICD-10-CM: M19.90  ICD-9-CM: 716.90  7/20/2020 - Present Unknown        Morbidly obese ([facility] [address] ICD-10-CM: E66.01  ICD-9-CM: 278.01  7/20/2020 - Present Unknown        Adrenal adenoma, right ICD-10-CM: D35.01  ICD-9-CM: 227.0  7/20/2020 - Present Unknown    Overview Signed 7/20/2020  2:53 PM by Masha Panchal MD     Benign fat-containing 5.5 cm right adrenal adenoma             SIRS (systemic inflammatory response syndrome) ([facility] [address] ICD-10-CM: R65.10  ICD-9-CM: 995.90  7/20/2020 - Present Unknown              A/P:      --Sepsis due to MSSA not complicated by organ dysfunctionappreciate ID recommendations regarding duration of therapy and held picc placement until 48hr repeat culture negative. DREA was negative for any vegetations. Likely uncomplicated staph aureus sepsis likely will need 14-day IV antibiotics. PICC line placement once repeat cultures negative at 48 hourstomorrow.   If negative    --- Malignant hypertension--resolved with resumption of home ACE/thiazide diuretic combination with magnesium supplementation.  -Recurrent hypokalemiasupplement and maintain daily potassium for goal of serum potassium 4.0.    -Adrenal adenoma-Outpatient follow-up imagingbenign appearing comment regarding fat-containingunchanged    Scapular painmid scapular thoracic region back painMRI imaging with and without contrast evaluate for abscess/infection regarding unknown source staph aureus bacteremia        DC planning/Dispo: Pending recommended duration IV antibiotics see above. DVT ppx: Subcutaneous heparin    Code status: Full code  Medical decision maker: Selffamily contacts on file      Signed:  Burton BISHOP

## 2020-07-24 NOTE — ROUTINE PROCESS
Bedside and Verbal shift change report given to [address] [place] [address] (oncoming nurse) by Mandy Nicole (offgoing nurse). Report included the following information SBAR, Kardex, Intake/Output, MAR and Recent Results.

## 2020-07-24 NOTE — ROUTINE PROCESS
Bedside and Verbal shift change report received from Pooja Quan (offgoing nurse). Report included the following information SBAR, Kardex, Procedure Summary and Recent Results. Opportunity for questions provided. Pt currently off floor to radiology.

## 2020-07-24 NOTE — PROGRESS NOTES
Spiritual Care Visit, initial visit, Attempted. Staff was with patient. Visit by Rome Marrero, Staff .  Magdalena., Steven.MISHA., B.A.

## 2020-07-24 NOTE — PROGRESS NOTES
Spiritual Care Visit, initial visit. Visited with patient at bedside. Prayed for patient's healing and health. Visit by Jersey Kasper, Staff .  Magdalena., Steven.MISHA., B.A.

## 2020-07-24 NOTE — PROGRESS NOTES
Care Management Interventions  PCP Verified by CM: Yes(Seen at [facility] 3 months ago)  Mode of Transport at Discharge: Other (see comment)(Rajan Nicholson  Other Relative  [phone]  )  Transition of Care Consult (CM Consult): Discharge Planning, [address]: Yes  Discharge Durable Medical Equipment: No  Physical Therapy Consult: No  Occupational Therapy Consult: No  Current Support Network: Other(Her 2 sons live with her in a house)  Confirm Follow Up Transport: Family  The Plan for Transition of Care is Related to the Following Treatment Goals : Home health  The Patient and/or Patient Representative was Provided with a Choice of Provider and Agrees with the Discharge Plan?: Yes  Name of the Patient Representative Who was Provided with a Choice of Provider and Agrees with the Discharge Plan: Patient  Freedom of Choice List was Provided with Basic Dialogue that Supports the Patient's Individualized Plan of Care/Goals, Treatment Preferences and Shares the Quality Data Associated with the Providers?: Yes  Discharge Location  Discharge Placement: Home with home health  CM received [employer] cost analysis for IV ABX for 2 weeks. Case management has accepted financial coverage for the 2 week course. [facility] will be sent referral for PICC . CM reviewed clinical notes regarding pt's current status not progressing to place PICC line. Pt current with [facility] for PCP. CM following.

## 2020-07-25 PROBLEM — M46.20 OSTEOMYELITIS OF SPINE (HCC): Status: ACTIVE | Noted: 2020-07-25

## 2020-07-25 LAB
ANION GAP SERPL CALC-SCNC: 5 MMOL/L (ref 7–16)
BASOPHILS # BLD: 0.1 K/UL (ref 0–0.2)
BASOPHILS NFR BLD: 1 % (ref 0–2)
BUN SERPL-MCNC: 21 MG/DL (ref 6–23)
CALCIUM SERPL-MCNC: 8.4 MG/DL (ref 8.3–10.4)
CHLORIDE SERPL-SCNC: 98 MMOL/L (ref 98–107)
CO2 SERPL-SCNC: 33 MMOL/L (ref 21–32)
CREAT SERPL-MCNC: 0.87 MG/DL (ref 0.6–1)
DIFFERENTIAL METHOD BLD: ABNORMAL
EOSINOPHIL # BLD: 0.3 K/UL (ref 0–0.8)
EOSINOPHIL NFR BLD: 1 % (ref 0.5–7.8)
ERYTHROCYTE [DISTWIDTH] IN BLOOD BY AUTOMATED COUNT: 15.6 % (ref 11.9–14.6)
GLUCOSE SERPL-MCNC: 141 MG/DL (ref 65–100)
HCT VFR BLD AUTO: 34.5 % (ref 35.8–46.3)
HGB BLD-MCNC: 11.4 G/DL (ref 11.7–15.4)
IMM GRANULOCYTES # BLD AUTO: 1.3 K/UL (ref 0–0.5)
IMM GRANULOCYTES NFR BLD AUTO: 6 % (ref 0–5)
LYMPHOCYTES # BLD: 3.6 K/UL (ref 0.5–4.6)
LYMPHOCYTES NFR BLD: 17 % (ref 13–44)
MCH RBC QN AUTO: 28.8 PG (ref 26.1–32.9)
MCHC RBC AUTO-ENTMCNC: 33 G/DL (ref 31.4–35)
MCV RBC AUTO: 87.1 FL (ref 79.6–97.8)
MONOCYTES # BLD: 1.4 K/UL (ref 0.1–1.3)
MONOCYTES NFR BLD: 7 % (ref 4–12)
NEUTS SEG # BLD: 14.1 K/UL (ref 1.7–8.2)
NEUTS SEG NFR BLD: 68 % (ref 43–78)
NRBC # BLD: 0 K/UL (ref 0–0.2)
PLATELET # BLD AUTO: 250 K/UL (ref 150–450)
PMV BLD AUTO: 12.3 FL (ref 9.4–12.3)
POTASSIUM SERPL-SCNC: 3.2 MMOL/L (ref 3.5–5.1)
RBC # BLD AUTO: 3.96 M/UL (ref 4.05–5.2)
SODIUM SERPL-SCNC: 136 MMOL/L (ref 136–145)
WBC # BLD AUTO: 20.8 K/UL (ref 4.3–11.1)

## 2020-07-25 PROCEDURE — 74011250637 HC RX REV CODE- 250/637: Performed by: HOSPITALIST

## 2020-07-25 PROCEDURE — 36415 COLL VENOUS BLD VENIPUNCTURE: CPT

## 2020-07-25 PROCEDURE — 74011250637 HC RX REV CODE- 250/637: Performed by: INTERNAL MEDICINE

## 2020-07-25 PROCEDURE — 36573 INSJ PICC RS&I 5 YR+: CPT | Performed by: INTERNAL MEDICINE

## 2020-07-25 PROCEDURE — 65270000029 HC RM PRIVATE

## 2020-07-25 PROCEDURE — 80048 BASIC METABOLIC PNL TOTAL CA: CPT

## 2020-07-25 PROCEDURE — 85025 COMPLETE CBC W/AUTO DIFF WBC: CPT

## 2020-07-25 PROCEDURE — 74011000250 HC RX REV CODE- 250: Performed by: INTERNAL MEDICINE

## 2020-07-25 PROCEDURE — 02HV33Z INSERTION OF INFUSION DEVICE INTO SUPERIOR VENA CAVA, PERCUTANEOUS APPROACH: ICD-10-PCS | Performed by: INTERNAL MEDICINE

## 2020-07-25 PROCEDURE — C1751 CATH, INF, PER/CENT/MIDLINE: HCPCS

## 2020-07-25 PROCEDURE — 74011250637 HC RX REV CODE- 250/637: Performed by: FAMILY MEDICINE

## 2020-07-25 PROCEDURE — 74011250636 HC RX REV CODE- 250/636: Performed by: INTERNAL MEDICINE

## 2020-07-25 RX ORDER — POTASSIUM CHLORIDE 20 MEQ/1
40 TABLET, EXTENDED RELEASE ORAL
Status: COMPLETED | OUTPATIENT
Start: 2020-07-25 | End: 2020-07-25

## 2020-07-25 RX ORDER — SODIUM CHLORIDE 0.9 % (FLUSH) 0.9 %
10 SYRINGE (ML) INJECTION EVERY 8 HOURS
Status: DISCONTINUED | OUTPATIENT
Start: 2020-07-25 | End: 2020-07-28 | Stop reason: HOSPADM

## 2020-07-25 RX ORDER — SODIUM CHLORIDE 0.9 % (FLUSH) 0.9 %
10 SYRINGE (ML) INJECTION AS NEEDED
Status: DISCONTINUED | OUTPATIENT
Start: 2020-07-25 | End: 2020-07-28 | Stop reason: HOSPADM

## 2020-07-25 RX ADMIN — Medication 10 ML: at 03:18

## 2020-07-25 RX ADMIN — MAGNESIUM GLUCONATE 500 MG ORAL TABLET 400 MG: 500 TABLET ORAL at 17:10

## 2020-07-25 RX ADMIN — ORPHENADRINE CITRATE 100 MG: 100 TABLET, EXTENDED RELEASE ORAL at 21:41

## 2020-07-25 RX ADMIN — Medication 10 ML: at 06:39

## 2020-07-25 RX ADMIN — KETOROLAC TROMETHAMINE 30 MG: 30 INJECTION, SOLUTION INTRAMUSCULAR at 14:57

## 2020-07-25 RX ADMIN — Medication 10 ML: at 21:42

## 2020-07-25 RX ADMIN — ORPHENADRINE CITRATE 100 MG: 100 TABLET, EXTENDED RELEASE ORAL at 08:06

## 2020-07-25 RX ADMIN — SODIUM CHLORIDE 75 ML/HR: 9 INJECTION, SOLUTION INTRAVENOUS at 17:39

## 2020-07-25 RX ADMIN — CEFAZOLIN SODIUM 2 G: 100 INJECTION, POWDER, LYOPHILIZED, FOR SOLUTION INTRAVENOUS at 17:11

## 2020-07-25 RX ADMIN — TRAMADOL HYDROCHLORIDE 50 MG: 50 TABLET, FILM COATED ORAL at 20:00

## 2020-07-25 RX ADMIN — Medication 10 ML: at 14:20

## 2020-07-25 RX ADMIN — POTASSIUM CHLORIDE 40 MEQ: 20 TABLET, EXTENDED RELEASE ORAL at 17:10

## 2020-07-25 RX ADMIN — CEFAZOLIN SODIUM 2 G: 100 INJECTION, POWDER, LYOPHILIZED, FOR SOLUTION INTRAVENOUS at 03:17

## 2020-07-25 RX ADMIN — KETOROLAC TROMETHAMINE 30 MG: 30 INJECTION, SOLUTION INTRAMUSCULAR at 08:06

## 2020-07-25 RX ADMIN — TRAMADOL HYDROCHLORIDE 50 MG: 50 TABLET, FILM COATED ORAL at 12:52

## 2020-07-25 RX ADMIN — PANTOPRAZOLE SODIUM 40 MG: 40 TABLET, DELAYED RELEASE ORAL at 06:39

## 2020-07-25 RX ADMIN — POLYETHYLENE GLYCOL 3350 17 G: 17 POWDER, FOR SOLUTION ORAL at 21:41

## 2020-07-25 RX ADMIN — SODIUM CHLORIDE 75 ML/HR: 9 INJECTION, SOLUTION INTRAVENOUS at 03:21

## 2020-07-25 RX ADMIN — DOCUSATE SODIUM 100 MG: 100 CAPSULE, LIQUID FILLED ORAL at 08:06

## 2020-07-25 RX ADMIN — POTASSIUM CHLORIDE 20 MEQ: 20 TABLET, EXTENDED RELEASE ORAL at 08:06

## 2020-07-25 RX ADMIN — Medication 10 ML: at 17:12

## 2020-07-25 RX ADMIN — CEFAZOLIN SODIUM 2 G: 100 INJECTION, POWDER, LYOPHILIZED, FOR SOLUTION INTRAVENOUS at 09:33

## 2020-07-25 RX ADMIN — LISINOPRIL AND HYDROCHLOROTHIAZIDE 1 TABLET: 25; 20 TABLET ORAL at 08:06

## 2020-07-25 RX ADMIN — TRAMADOL HYDROCHLORIDE 50 MG: 50 TABLET, FILM COATED ORAL at 03:22

## 2020-07-25 RX ADMIN — MAGNESIUM GLUCONATE 500 MG ORAL TABLET 400 MG: 500 TABLET ORAL at 08:06

## 2020-07-25 NOTE — ROUTINE PROCESS
Bedside and Verbal shift change report given to [address] [place] [address] (oncoming nurse) by Rajan Sandoval RN (offgoing nurse). Report included the following information SBAR, Kardex, Intake/Output, MAR and Recent Results.

## 2020-07-25 NOTE — ROUTINE PROCESS
Bedside and Verbal shift change report given to Chuyita Lopez (oncoming nurse) by Mark ROBERTO (offgoing nurse). Report included the following information SBAR, Kardex, Procedure Summary, Intake/Output, MAR and Recent Results.

## 2020-07-25 NOTE — ROUTINE PROCESS
Bedside and Verbal shift change report received from Lorene Wei (offgoing nurse). Report included the following information SBAR, Kardex, Procedure Summary and Recent Results. Opportunity for questions provided.

## 2020-07-25 NOTE — PROGRESS NOTES
Dr. Asmita Putnam called for Neurosurgery consult. He will come see the patient shortly, but at this stage would not recommend any surgery. Will await his rounds to determine any new orders. Addendum [phone]: Called Dr. Asmita Putnam back, he still intends to come see the patient. Will let Dr. Jayme Culver know.

## 2020-07-25 NOTE — PROGRESS NOTES
Hospitalist Progress Note     Admit Date:  2020  7:36 AM   Name:  Kecia Cox   Age:  64 y.o.  :  1960   MRN:  [id number]   PCP:  Chelsea Cason MD  Treatment Team: Attending Provider: Thong Tyler DO; Care Manager: Ginna Mcgarry, RN; Utilization Review: Maritza Brito RN; Consulting Provider: Jori Snyder NP; Consulting Provider: Marlee Garner MD    Subjective:   HPI and or CC:  From history and physical HPI:  64 yr old  female patient who follows up with [facility], off her blood pressure medication for past 1 week. Known history of hypertension, morbid obesity, arthritis, sciatica and spine issues.     Pt says she is off blood pressure meds for a week. Since past 4 days started having sharp pain rt shoulder blade , then both shoulder blade region, constant, moderate intensity, not improving even after several doses of nsaid. Says also has sciatica pain more on the rt lower extremity. Started having vomiting, non bilious , about 7 episodes since last night. Dizziness, on and off since past 4 days ,since her blood pressures have been up.     Pt otherwise doesn't c/o head or shortness of breath or any left sided chest pain.     Wbc 19,k 3, bp 219/117  CT OF THE CHEST ABDOMEN AND PELVIS  1) Trace right pleural fusion. 2) Benign fat-containing 5.5 cm right adrenal adenoma.     Pt presently on cardene drip for elevated bp.     Pt will be admitted to ICU for malignant htn, hypokalemia and does meet the criteria for SIRS    :  Chief complaint : Thoracic mid scapular back pain    Toradol effective for what appears to be osteomyelitis thoracic spinesource of her back pain documented MSSA sepsis. Receiving IV Ancef. She is trying to minimize IV narcoticsher  apparently had a narcotic addiction problem [facility]   Discussed limited dosing Toradol regarding ulcerogenic medication she appears to understand. Will use fixed dose Tylenol, add gabapentin regarding pain. She is aware of delayed benefit. Analgesia T-spine improved today. .      Objective:     Patient Vitals for the past 24 hrs:   Temp Pulse Resp BP SpO2   07/25/20 1254 98 °F (36.7 °C) 85 18 146/89 94 %   07/25/20 0907 98.6 °F (37 °C) 74 17 143/86 92 %   07/25/20 0806  76  169/72    07/25/20 0523 98 °F (36.7 °C) 81 17 135/79 92 %   07/25/20 0036 98.4 °F (36.9 °C) 86 18 127/74 94 %   07/24/20 2005 98.4 °F (36.9 °C) 84 17 (!) 154/92 91 %   07/24/20 1633 97.8 °F (36.6 °C) 80 18 130/82 93 %     Oxygen Therapy  O2 Sat (%): 94 % (07/25/20 1254)  Pulse via Oximetry: 88 beats per minute (07/22/20 1559)  O2 Device: Room air (07/25/20 1254)  O2 Flow Rate (L/min): 2 l/min (07/23/20 0400)    Intake/Output Summary (Last 24 hours) at 7/25/2020 1606  Last data filed at 7/25/2020 1150  Gross per 24 hour   Intake 1717.5 ml   Output 550 ml   Net 1167.5 ml         REVIEW OF SYSTEMS: Comprehensive ROS performed and negative except as stated in HPI. Physical Examination:  Physical Exam  Vitals signs and nursing note reviewed. Constitutional:       General: She is not in acute distress. HENT:      Head: Normocephalic and atraumatic. Nose: Rhinorrhea present. No congestion. Mouth/Throat:      Mouth: Mucous membranes are dry. Pharynx: Oropharynx is clear. Eyes:      Conjunctiva/sclera: Conjunctivae normal.      Pupils: Pupils are equal, round, and reactive to light. Neck:      Musculoskeletal: Normal range of motion. Cardiovascular:      Rate and Rhythm: Normal rate and regular rhythm. Heart sounds: No gallop. Pulmonary:      Effort: No respiratory distress. Breath sounds: No wheezing. Abdominal:      General: Bowel sounds are normal.      Palpations: Abdomen is soft. Musculoskeletal:         General: No deformity. Right lower leg: No edema.       Comments: Mid scapular back pain- Lymphadenopathy:      Cervical: No cervical adenopathy. Skin:     Capillary Refill: Capillary refill takes less than 2 seconds. Findings: No erythema or rash. Neurological:      Mental Status: She is alert and oriented to person, place, and time. Sensory: No sensory deficit. Psychiatric:         Mood and Affect: Mood normal.         Thought Content: Thought content normal.         Data Review:  I have reviewed all labs, meds, telemetry events, and studies from the last 24 hours. Recent Results (from the past 24 hour(s))   METABOLIC PANEL, BASIC    Collection Time: 07/25/20  3:55 AM   Result Value Ref Range    Sodium 136 136 - 145 mmol/L    Potassium 3.2 (L) 3.5 - 5.1 mmol/L    Chloride 98 98 - 107 mmol/L    CO2 33 (H) 21 - 32 mmol/L    Anion gap 5 (L) 7 - 16 mmol/L    Glucose 141 (H) 65 - 100 mg/dL    BUN 21 6 - 23 MG/DL    Creatinine 0.87 0.6 - 1.0 MG/DL    GFR est AA >60 >60 ml/min/1.73m2    GFR est non-AA >60 >60 ml/min/1.73m2    Calcium 8.4 8.3 - 10.4 MG/DL   CBC WITH AUTOMATED DIFF    Collection Time: 07/25/20  3:55 AM   Result Value Ref Range    WBC 20.8 (H) 4.3 - 11.1 K/uL    RBC 3.96 (L) 4.05 - 5.2 M/uL    HGB 11.4 (L) 11.7 - 15.4 g/dL    HCT 34.5 (L) 35.8 - 46.3 %    MCV 87.1 79.6 - 97.8 FL    MCH 28.8 26.1 - 32.9 PG    MCHC 33.0 31.4 - 35.0 g/dL    RDW 15.6 (H) 11.9 - 14.6 %    PLATELET 561 367 - 792 K/uL    MPV 12.3 9.4 - 12.3 FL    ABSOLUTE NRBC 0.00 0.0 - 0.2 K/uL    DF AUTOMATED      NEUTROPHILS 68 43 - 78 %    LYMPHOCYTES 17 13 - 44 %    MONOCYTES 7 4.0 - 12.0 %    EOSINOPHILS 1 0.5 - 7.8 %    BASOPHILS 1 0.0 - 2.0 %    IMMATURE GRANULOCYTES 6 (H) 0.0 - 5.0 %    ABS. NEUTROPHILS 14.1 (H) 1.7 - 8.2 K/UL    ABS. LYMPHOCYTES 3.6 0.5 - 4.6 K/UL    ABS. MONOCYTES 1.4 (H) 0.1 - 1.3 K/UL    ABS. EOSINOPHILS 0.3 0.0 - 0.8 K/UL    ABS. BASOPHILS 0.1 0.0 - 0.2 K/UL    ABS. IMM. GRANS.  1.3 (H) 0.0 - 0.5 K/UL        All Micro Results     Procedure Component Value Units Date/Time    CULTURE, BLOOD [[id number]] Collected:  07/23/20 0353    Order Status:  Completed Specimen:  Blood Updated:  07/25/20 0718     Special Requests: --        LEFT  HAND       Culture result: NO GROWTH 2 DAYS       CULTURE, BLOOD [[id number]] Collected:  07/23/20 0359    Order Status:  Completed Specimen:  Blood Updated:  07/25/20 0718     Special Requests: --        RIGHT  HAND       Culture result: NO GROWTH 2 DAYS       CULTURE, BLOOD [[id number]]  (Abnormal) Collected:  07/20/20 1828    Order Status:  Completed Specimen:  Blood Updated:  07/23/20 0737     Special Requests: --        RIGHT  HAND       GRAM STAIN       GRAM POS COCCI IN CLUSTERS                  AEROBIC AND ANAEROBIC BOTTLES                  RESULTS VERIFIED, PHONED TO AND READ BACK BY NADIA Her RN ON 7.21.2020 AT 09,             Culture result: STAPHYLOCOCCUS AUREUS               For Susceptibility Refer to Culture  [id number]      CULTURE, BLOOD [[id number]]  (Abnormal)  (Susceptibility) Collected:  07/20/20 1740    Order Status:  Completed Specimen:  Blood Updated:  07/23/20 0736     Special Requests: --        LEFT  HAND       GRAM STAIN       GRAM POS COCCI IN CLUSTERS                  AEROBIC AND ANAEROBIC BOTTLES                  RESULTS VERIFIED, PHONED TO AND READ BACK BY NADIA Her RN ON 7.21.2020 AT 0957,             Culture result: STAPHYLOCOCCUS AUREUS         REFER TO Saurabh Gonsalez Dr ACCESSION [id number]     BLOOD CULTURE ID PANEL [[id number]]  (Abnormal) Collected:  07/20/20 1740    Order Status:  Completed Specimen:  Blood Updated:  07/21/20 1121     Acc. no. from Micro Order [id number]     Staphylococcus Detected        Staphylococcus aureus Detected        Comment: RESULTS VERIFIED, PHONED TO AND READ BACK BY  NADIA Her RN ON 7/21/20 @09,           mecA (Methicillin-Resistance Genes) NOT DETECTED        INTERPRETATION       Gram positive cocci in clusters, identified in realtime PCR as probable MSSA.            Comment: Recommend discontinuing IV vancomycin starting cefazolin or nafcillin if patient not on beta-lactam therapy. Infectious Diseases Consult recommended in adult patients. THIS TEST DOES NOT REPLACE SENSITIVITY TESTING.              Current Meds:  Current Facility-Administered Medications   Medication Dose Route Frequency    0.9% sodium chloride infusion  75 mL/hr IntraVENous CONTINUOUS    ketorolac (TORADOL) injection 30 mg  30 mg IntraVENous Q6H PRN    pantoprazole (PROTONIX) tablet 40 mg  40 mg Oral ACB    orphenadrine citrate (NORFLEX) sr tablet 100 mg  100 mg Oral BID    polyethylene glycol (MIRALAX) packet 17 g  17 g Oral DAILY    polyethylene glycol (MIRALAX) packet 17 g  17 g Oral TID    docusate sodium (COLACE) capsule 100 mg  100 mg Oral DAILY    ceFAZolin (ANCEF) 2 g/20 mL in sterile water IV syringe  2 g IntraVENous Q8H    lidocaine (XYLOCAINE) 2 % viscous solution 15 mL  15 mL Mouth/Throat PRN    traMADoL (ULTRAM) tablet 50 mg  50 mg Oral Q6H PRN    hydrALAZINE (APRESOLINE) tablet 10 mg  10 mg Oral Q6H PRN    potassium chloride (K-DUR, KLOR-CON) SR tablet 20 mEq  20 mEq Oral DAILY    alum-mag hydroxide-simeth (MYLANTA) oral suspension 30 mL  30 mL Oral Q4H PRN    lisinopril-hydroCHLOROthiazide (PRINZIDE, ZESTORETIC) 20-25 mg per tablet 1 Tab  1 Tab Oral DAILY    sodium chloride (NS) flush 5-40 mL  5-40 mL IntraVENous Q8H    sodium chloride (NS) flush 5-40 mL  5-40 mL IntraVENous PRN    acetaminophen (TYLENOL) tablet 650 mg  650 mg Oral Q4H PRN    oxyCODONE-acetaminophen (PERCOCET) 5-325 mg per tablet 1 Tab  1 Tab Oral Q4H PRN    morphine injection 1 mg  1 mg IntraVENous Q4H PRN    naloxone (NARCAN) injection 0.4 mg  0.4 mg IntraVENous PRN    diphenhydrAMINE (BENADRYL) injection 12.5 mg  12.5 mg IntraVENous Q4H PRN    ondansetron (ZOFRAN) injection 4 mg  4 mg IntraVENous Q4H PRN    bisacodyL (DULCOLAX) tablet 5 mg  5 mg Oral DAILY PRN    magnesium oxide (MAG-OX) tablet 400 mg  400 mg Oral BID       Diet:  DIET CARDIAC    Other Studies (last 24 hours):  Mri Thorac Spine W Wo Cont    Result Date: 7/25/2020  MRI thoracic and lumbar spine without and with contrast 7/25/2020 CLINICAL HISTORY: Sepsis due to MSSA. Unknown source. Severe thoracic spine pain in mid scapular pain. TECHNIQUE: Axial and sagittal T1 and T2 weighted, and sagittal STIR images of the thoracic and lumbar spine. Additional axial and sagittal fat-saturated T1-weighted images were obtained after the uneventful intravenous administration of 23 mL Dotarem. COMPARISON: None. FINDINGS: MRI thoracic spine: No vertebral body height abnormality is seen. Alignments are maintained without significant listhesis. Abnormal STIR and T1 signal changes are most evident in the T5, T6, and T7 vertebral bodies. The appearance is concerning for osteomyelitis. This is furthermore suggested by abnormal signal changes in the paravertebral soft tissues particularly anterior and lateral to the thoracic vertebral bodies at these levels. It should be noted that assessment at these levels particularly in the axial plane is severely limited by patient motion. However, there is suggested enhancing soft tissue with areas of internal low signal. The possibility of paravertebral abscess is not excluded. These paravertebral soft tissue changes extend down to the T9 level. In addition, abnormal enhancement is felt to occur within the spinal canal about the thoracic cord from the T4 level through the T9 level. Currently, this is favored to represent phlegmonous changes extending into the spinal canal given and no clearly demonstrated enhancing abscess is seen. It should be noted that a subtle abscess could be missed given the significant motion artifact seen at these levels on axial images. No clear abnormal cord signal changes are seen at this time. Small to moderate bilateral dependent pleural effusions are seen.  MRI lumbar spine: Assessment of the lumbar spine is also mildly limited by patient motion although the degree of motion artifact is significantly less than that seen in the thoracic spine. Included portion of the retroperitoneum is remarkable for a 4.9 cm x 3.6 cm right suprarenal, likely adrenal mass. This was described as a benign finding on a prior CT scan dated 7/20/2020. A small benign right renal cortical cyst is seen on axial T2-weighted image 17 measuring 1.5 cm in size. The lowest defined disc will be labeled as L5-S1 for the purposes of this report. Using this labeling system, the conus terminates at T12-L1 disc level without tethering. The conus is normal in signal and morphology. Spinal alignment is maintained. No acute appearing STIR signal changes are seen of lumbar vertebrae or adjacent soft tissues. Heterogeneous marrow signal is seen without abnormal STIR signal changes felt to represent age-related marrow signal changes. L1-L2: Moderate disc degeneration. Annular disc bulge, and moderate redundancy of the ligamentum flavum. These reduce the effective AP spinal canal diameter to 9 mm consistent with mild central canal stenosis. Mild right neural foraminal stenosis is also seen. L2-L3: Mild disc degeneration. Annular disc bulge, severe redundancy of the ligamentum flavum, and moderate bilateral facet hypertrophic degenerative changes. These reduce the effective AP spinal canal diameter to 7 mm consistent with moderate to severe central canal stenosis. Some maintained CSF space is seen about mildly crowded nerve roots at this level. Mild bilateral neural foraminal stenosis is seen. L3-L4: Annular disc bulge, moderate in intensity of the ligamentum flavum, and moderate bilateral facet hypertrophic degenerative changes. These reduce the effective AP spinal canal diameter to 8 mm consistent with moderate central canal stenosis. Mild bilateral neural foraminal stenosis is seen, right greater than left.  A cystic structure extends posteriorly from the right L3-4 facet. This is best demonstrated on sagittal T2-weighted image 8. This demonstrates mild peripheral enhancement. The appearance and location is consistent with a synovial cyst. L4-L5: Annular disc bulge, moderate redundancy of the ligamentum flavum, and moderate to severe bilateral facet hypertrophic degenerative changes. These reduce the effective AP spinal canal diameter to 6 mm consistent with critical central canal stenosis. There is crowding of nerve roots at this level with near complete effacement of CSF space. Mild left, and moderate right neural foraminal stenosis is seen. L5-S1:  Large posterior disc herniation severely stenosing the spinal canal at this level with residual AP spinal canal diameter measuring between 2 and 3 mm in size. Moderate right neural foraminal stenosis is seen. Moderate bilateral facet hypertrophic degenerative changes are seen. No abnormal enhancement is seen following administration of contrast.     IMPRESSION: 1. Abnormal marrow signal changes involving multiple midthoracic vertebral bodies concerning for osteomyelitis. Adjacent paravertebral soft tissue signal changes are seen concerning for paravertebral abscess although characterization is somewhat limited by significant patient motion artifact. Abnormal enhancement extends into the thoracic canal although no clear defined epidural abscess is seen. Once again, assessment is limited due to significant patient motion artifact which could obscure a subtle epidural abscess. 2. No acute osseous abnormality of the lumbar spine. Only multilevel discogenic degenerative changes and relatively advanced degenerative changes are seen as described above. These produce central canal stenoses and neural foraminal stenoses at multiple levels. Mri Lumb Spine W Wo Cont    Result Date: 7/25/2020  MRI thoracic and lumbar spine without and with contrast 7/25/2020 CLINICAL HISTORY: Sepsis due to MSSA. Unknown source.  Severe thoracic spine pain in mid scapular pain. TECHNIQUE: Axial and sagittal T1 and T2 weighted, and sagittal STIR images of the thoracic and lumbar spine. Additional axial and sagittal fat-saturated T1-weighted images were obtained after the uneventful intravenous administration of 23 mL Dotarem. COMPARISON: None. FINDINGS: MRI thoracic spine: No vertebral body height abnormality is seen. Alignments are maintained without significant listhesis. Abnormal STIR and T1 signal changes are most evident in the T5, T6, and T7 vertebral bodies. The appearance is concerning for osteomyelitis. This is furthermore suggested by abnormal signal changes in the paravertebral soft tissues particularly anterior and lateral to the thoracic vertebral bodies at these levels. It should be noted that assessment at these levels particularly in the axial plane is severely limited by patient motion. However, there is suggested enhancing soft tissue with areas of internal low signal. The possibility of paravertebral abscess is not excluded. These paravertebral soft tissue changes extend down to the T9 level. In addition, abnormal enhancement is felt to occur within the spinal canal about the thoracic cord from the T4 level through the T9 level. Currently, this is favored to represent phlegmonous changes extending into the spinal canal given and no clearly demonstrated enhancing abscess is seen. It should be noted that a subtle abscess could be missed given the significant motion artifact seen at these levels on axial images. No clear abnormal cord signal changes are seen at this time. Small to moderate bilateral dependent pleural effusions are seen. MRI lumbar spine: Assessment of the lumbar spine is also mildly limited by patient motion although the degree of motion artifact is significantly less than that seen in the thoracic spine. Included portion of the retroperitoneum is remarkable for a 4.9 cm x 3.6 cm right suprarenal, likely adrenal mass.  This was described as a benign finding on a prior CT scan dated 7/20/2020. A small benign right renal cortical cyst is seen on axial T2-weighted image 17 measuring 1.5 cm in size. The lowest defined disc will be labeled as L5-S1 for the purposes of this report. Using this labeling system, the conus terminates at T12-L1 disc level without tethering. The conus is normal in signal and morphology. Spinal alignment is maintained. No acute appearing STIR signal changes are seen of lumbar vertebrae or adjacent soft tissues. Heterogeneous marrow signal is seen without abnormal STIR signal changes felt to represent age-related marrow signal changes. L1-L2: Moderate disc degeneration. Annular disc bulge, and moderate redundancy of the ligamentum flavum. These reduce the effective AP spinal canal diameter to 9 mm consistent with mild central canal stenosis. Mild right neural foraminal stenosis is also seen. L2-L3: Mild disc degeneration. Annular disc bulge, severe redundancy of the ligamentum flavum, and moderate bilateral facet hypertrophic degenerative changes. These reduce the effective AP spinal canal diameter to 7 mm consistent with moderate to severe central canal stenosis. Some maintained CSF space is seen about mildly crowded nerve roots at this level. Mild bilateral neural foraminal stenosis is seen. L3-L4: Annular disc bulge, moderate in intensity of the ligamentum flavum, and moderate bilateral facet hypertrophic degenerative changes. These reduce the effective AP spinal canal diameter to 8 mm consistent with moderate central canal stenosis. Mild bilateral neural foraminal stenosis is seen, right greater than left. A cystic structure extends posteriorly from the right L3-4 facet. This is best demonstrated on sagittal T2-weighted image 8. This demonstrates mild peripheral enhancement.  The appearance and location is consistent with a synovial cyst. L4-L5: Annular disc bulge, moderate redundancy of the ligamentum flavum, and moderate to severe bilateral facet hypertrophic degenerative changes. These reduce the effective AP spinal canal diameter to 6 mm consistent with critical central canal stenosis. There is crowding of nerve roots at this level with near complete effacement of CSF space. Mild left, and moderate right neural foraminal stenosis is seen. L5-S1:  Large posterior disc herniation severely stenosing the spinal canal at this level with residual AP spinal canal diameter measuring between 2 and 3 mm in size. Moderate right neural foraminal stenosis is seen. Moderate bilateral facet hypertrophic degenerative changes are seen. No abnormal enhancement is seen following administration of contrast.     IMPRESSION: 1. Abnormal marrow signal changes involving multiple midthoracic vertebral bodies concerning for osteomyelitis. Adjacent paravertebral soft tissue signal changes are seen concerning for paravertebral abscess although characterization is somewhat limited by significant patient motion artifact. Abnormal enhancement extends into the thoracic canal although no clear defined epidural abscess is seen. Once again, assessment is limited due to significant patient motion artifact which could obscure a subtle epidural abscess. 2. No acute osseous abnormality of the lumbar spine. Only multilevel discogenic degenerative changes and relatively advanced degenerative changes are seen as described above. These produce central canal stenoses and neural foraminal stenoses at multiple levels.        Assessment and Plan:     Hospital Problems as of 7/25/2020 Never Reviewed          Codes Class Noted - Resolved POA    * (Principal) Osteomyelitis of spine ([facility] [email or web address] ICD-10-CM: M46.20  ICD-9-CM: 730.28  7/25/2020 - Present Unknown        Constipation ICD-10-CM: K59.00  ICD-9-CM: 564.00  7/22/2020 - Present Unknown        Nausea and vomiting ICD-10-CM: R11.2  ICD-9-CM: 787.01  7/22/2020 - Present Unknown        Sepsis due to methicillin susceptible Staphylococcus aureus (MSSA) without acute organ dysfunction (HCC) ICD-10-CM: A41.01  ICD-9-CM: 038.11, 995.91  7/22/2020 - Present Unknown        Bacteremia ICD-10-CM: R78.81  ICD-9-CM: 790.7  7/21/2020 - Present Unknown        Hypokalemia ICD-10-CM: E87.6  ICD-9-CM: 276.8  7/20/2020 - Present Unknown        HTN (hypertension), malignant ICD-10-CM: I10  ICD-9-CM: 401.0  7/20/2020 - Present Unknown        Arthritis ICD-10-CM: M19.90  ICD-9-CM: 716.90  7/20/2020 - Present Unknown        Morbidly obese ([facility] ICD-10-CM: E66.01  ICD-9-CM: 278.01  7/20/2020 - Present Unknown        Adrenal adenoma, right ICD-10-CM: D35.01  ICD-9-CM: 227.0  7/20/2020 - Present Unknown    Overview Signed 7/20/2020  2:53 PM by Louann Marinelli MD     Benign fat-containing 5.5 cm right adrenal adenoma             SIRS (systemic inflammatory response syndrome) ([facility] [address] ICD-10-CM: R65.10  ICD-9-CM: 995.90  7/20/2020 - Present Unknown              A/P:      --Sepsis due to MSSA not complicated by organ dysfunctionappreciate ID recommendations/follow up. Repeat blood cultures negative at 48 hours. PICC line insertion may need 8 weeks IV antibioticsplease see ID recommendationscefazolin    --Thoracic spine osteomyelitis with possible paravertebral abscess multilevel most obvious findings appear to be T4-T8 see report. Neurosurgery has seen patient and recommended intravenous antibiotic management and follow-upas per ID may need \"washout\". --- Malignant hypertension--resolved with resumption of home ACE/thiazide diuretic combination with magnesium supplementation.      -Adrenal adenoma-Outpatient follow-up imagingbenign appearing comment regarding fat-containingunchanged            DC planning/Dispo: Pending recommended duration IV antibiotics see above. DVT ppx: Subcutaneous heparin    Code status: Full code  Medical decision maker: Selffamily contacts on file      Signed:  Burton BISHOP

## 2020-07-25 NOTE — PROGRESS NOTES
Attempted to contact vascular team for PICC line. No answer. Will attempt to call again later. Addendum 11:13   PICC line attempted and unsuccessful. PICC team will retry later today.

## 2020-07-25 NOTE — ROUTINE PROCESS
Verbal bedside report given to Shellie Townsend oncoming RN. Patient's situation, background, assessment and recommendations provided. Opportunity for questions provided. Oncoming RN assumed care of patient.

## 2020-07-25 NOTE — PROGRESS NOTES
Unsuccessful PICC Placement Note  Correct Procedure: yes time out completed assistant Carol Boyce, RN all persons present in agreement with time out. PRE-PROCEDURE VERIFICATION  Correct Site:  Yes  Allergies verfied:  Yes  Temperature: Temp: 98.6 °F (37 °C), Temperature Source: Temp Source: Oral  Recent Labs     07/25/20  0355   BUN 21   CREA 0.87      WBC 20.8*     Allergies: @AL     PROCEDURE DETAIL  A single lumen PICC line was attempted for antibiotic therapy. Complication related to insertion: guidewire would not thread into right basilic vein and brachial vein. Primary nurse aware and will re-attempt left arm later.  Patient in agreement  Lot #: [id number]  Xylocaine used: yes  Mid-Arm Circumference: 41 (cm)

## 2020-07-25 NOTE — PROGRESS NOTES
PICC Placement Note    PRE-PROCEDURE VERIFICATION  Correct Procedure: yes. Time out completed with assistant Dahiana Jacques rn and all persons present in agreement with time out. Correct Site:  yes  Temperature: Temp: 98 °F (36.7 °C), Temperature Source: Temp Source: Oral  Recent Labs     07/25/20  0355   BUN 21   CREA 0.87      WBC 20.8*     Allergies: Lortab [hydrocodone-acetaminophen]  Education materials for [facility] given to patient or family. PROCEDURE DETAIL  A single lumen PICC line was started for antibiotic therapy. The following documentation is in addition to the PICC properties in the lines/airways flowsheet :  Lot #: [id number]  xylocaine used: yes  Mid-Arm Circumference: 42 (cm)  Internal Catheter Length: 55 (cm)  Internal Catheter Total Length: 55 (cm)  Vein Selection for PICC:left cephalic  Central Line Bundle followed yes  Complication Related to Insertion: none  Both the insertion guidewire and ECG guidewire were removed intact all ports have positive blood return and were flush well with normal saline. The location of the tip of the PICC is verified using ECG technology. The tip is in the SVC per ECG reading. See image below.                Line is okay to use: yes

## 2020-07-25 NOTE — PROGRESS NOTES
Neurosurgery consult    63 yo F presenting with generalized fatigue, malaise, weakness \"all over\" with thoracic back pain.  Patient describes balance trouble, sciatica and urge incontinence for \"2 years\" no worse recently  Has felt overall fatigue without specific progession of LE symptoms  (+) Back pain    PMH  Morbid obesity  Sciatica   HTN    Strength 4+/5 BLE pain limited  No clonus 1+ Patellar reflexes  Sensation to LT intact  DF, PF 5/5    MRI with paraspinal abscess with R epidural component with moderate multilevel thoracic stenosis without severe cord compression or sgnal change    63 yo F with paraspinal abscess, epidural component without LE myelopathy    - Bld Cx with staph  - Targeted abx therapy pending susceptibilities  - We did discuss surgery in the setting of neurologic decline or failure of abx therapy

## 2020-07-25 NOTE — PROGRESS NOTES
Chart reviewed, patient NOT seen. Repeat blood cultures negative at 48 hours. Afebrile but still with leukocytosis. MRI of spine with likely OM in midthoracic vertebrae and possible paravertebral abscess though motion limits full evaluation. May also have epidural abscess. Seen by neurosurgery, plan for medial management for now. Will closely monitor as may require intervention for washout. Continue ancef, with MSSA in spine would anticipate 8 weeks from time of culture clearance depending on clinical course. ID will follow up again on Monday unless called.

## 2020-07-26 LAB
ANION GAP SERPL CALC-SCNC: 4 MMOL/L (ref 7–16)
BASOPHILS # BLD: 0.2 K/UL (ref 0–0.2)
BASOPHILS NFR BLD: 1 % (ref 0–2)
BUN SERPL-MCNC: 17 MG/DL (ref 6–23)
CALCIUM SERPL-MCNC: 8.6 MG/DL (ref 8.3–10.4)
CHLORIDE SERPL-SCNC: 103 MMOL/L (ref 98–107)
CO2 SERPL-SCNC: 33 MMOL/L (ref 21–32)
CREAT SERPL-MCNC: 0.93 MG/DL (ref 0.6–1)
DIFFERENTIAL METHOD BLD: ABNORMAL
EOSINOPHIL # BLD: 0.3 K/UL (ref 0–0.8)
EOSINOPHIL NFR BLD: 1 % (ref 0.5–7.8)
ERYTHROCYTE [DISTWIDTH] IN BLOOD BY AUTOMATED COUNT: 16.2 % (ref 11.9–14.6)
GLUCOSE SERPL-MCNC: 92 MG/DL (ref 65–100)
HCT VFR BLD AUTO: 35.8 % (ref 35.8–46.3)
HGB BLD-MCNC: 11.6 G/DL (ref 11.7–15.4)
IMM GRANULOCYTES # BLD AUTO: 1.8 K/UL (ref 0–0.5)
IMM GRANULOCYTES NFR BLD AUTO: 7 % (ref 0–5)
LYMPHOCYTES # BLD: 4.4 K/UL (ref 0.5–4.6)
LYMPHOCYTES NFR BLD: 18 % (ref 13–44)
MCH RBC QN AUTO: 28.9 PG (ref 26.1–32.9)
MCHC RBC AUTO-ENTMCNC: 32.4 G/DL (ref 31.4–35)
MCV RBC AUTO: 89.1 FL (ref 79.6–97.8)
MONOCYTES # BLD: 1.4 K/UL (ref 0.1–1.3)
MONOCYTES NFR BLD: 6 % (ref 4–12)
NEUTS SEG # BLD: 16.3 K/UL (ref 1.7–8.2)
NEUTS SEG NFR BLD: 67 % (ref 43–78)
NRBC # BLD: 0 K/UL (ref 0–0.2)
PLATELET # BLD AUTO: 308 K/UL (ref 150–450)
PMV BLD AUTO: 12.8 FL (ref 9.4–12.3)
POTASSIUM SERPL-SCNC: 4.2 MMOL/L (ref 3.5–5.1)
RBC # BLD AUTO: 4.02 M/UL (ref 4.05–5.2)
SODIUM SERPL-SCNC: 140 MMOL/L (ref 136–145)
WBC # BLD AUTO: 24.4 K/UL (ref 4.3–11.1)

## 2020-07-26 PROCEDURE — 74011250637 HC RX REV CODE- 250/637: Performed by: INTERNAL MEDICINE

## 2020-07-26 PROCEDURE — 85025 COMPLETE CBC W/AUTO DIFF WBC: CPT

## 2020-07-26 PROCEDURE — 74011250637 HC RX REV CODE- 250/637: Performed by: FAMILY MEDICINE

## 2020-07-26 PROCEDURE — 65270000029 HC RM PRIVATE

## 2020-07-26 PROCEDURE — 80048 BASIC METABOLIC PNL TOTAL CA: CPT

## 2020-07-26 PROCEDURE — 94760 N-INVAS EAR/PLS OXIMETRY 1: CPT

## 2020-07-26 PROCEDURE — 74011000250 HC RX REV CODE- 250: Performed by: INTERNAL MEDICINE

## 2020-07-26 PROCEDURE — 36591 DRAW BLOOD OFF VENOUS DEVICE: CPT

## 2020-07-26 PROCEDURE — 74011250637 HC RX REV CODE- 250/637: Performed by: HOSPITALIST

## 2020-07-26 PROCEDURE — 74011250636 HC RX REV CODE- 250/636: Performed by: INTERNAL MEDICINE

## 2020-07-26 RX ORDER — FLUCONAZOLE 100 MG/1
150 TABLET ORAL
Status: COMPLETED | OUTPATIENT
Start: 2020-07-26 | End: 2020-07-26

## 2020-07-26 RX ADMIN — KETOROLAC TROMETHAMINE 30 MG: 30 INJECTION, SOLUTION INTRAMUSCULAR at 22:50

## 2020-07-26 RX ADMIN — SODIUM CHLORIDE 75 ML/HR: 9 INJECTION, SOLUTION INTRAVENOUS at 08:11

## 2020-07-26 RX ADMIN — MAGNESIUM GLUCONATE 500 MG ORAL TABLET 400 MG: 500 TABLET ORAL at 18:13

## 2020-07-26 RX ADMIN — ORPHENADRINE CITRATE 100 MG: 100 TABLET, EXTENDED RELEASE ORAL at 08:09

## 2020-07-26 RX ADMIN — PANTOPRAZOLE SODIUM 40 MG: 40 TABLET, DELAYED RELEASE ORAL at 06:33

## 2020-07-26 RX ADMIN — MAGNESIUM GLUCONATE 500 MG ORAL TABLET 400 MG: 500 TABLET ORAL at 08:09

## 2020-07-26 RX ADMIN — Medication 10 ML: at 02:40

## 2020-07-26 RX ADMIN — TRAMADOL HYDROCHLORIDE 50 MG: 50 TABLET, FILM COATED ORAL at 20:01

## 2020-07-26 RX ADMIN — TRAMADOL HYDROCHLORIDE 50 MG: 50 TABLET, FILM COATED ORAL at 06:33

## 2020-07-26 RX ADMIN — KETOROLAC TROMETHAMINE 30 MG: 30 INJECTION, SOLUTION INTRAMUSCULAR at 00:31

## 2020-07-26 RX ADMIN — CEFAZOLIN SODIUM 2 G: 100 INJECTION, POWDER, LYOPHILIZED, FOR SOLUTION INTRAVENOUS at 18:13

## 2020-07-26 RX ADMIN — ORPHENADRINE CITRATE 100 MG: 100 TABLET, EXTENDED RELEASE ORAL at 21:18

## 2020-07-26 RX ADMIN — DOCUSATE SODIUM 100 MG: 100 CAPSULE, LIQUID FILLED ORAL at 08:09

## 2020-07-26 RX ADMIN — KETOROLAC TROMETHAMINE 30 MG: 30 INJECTION, SOLUTION INTRAMUSCULAR at 12:12

## 2020-07-26 RX ADMIN — CEFAZOLIN SODIUM 2 G: 100 INJECTION, POWDER, LYOPHILIZED, FOR SOLUTION INTRAVENOUS at 10:08

## 2020-07-26 RX ADMIN — LISINOPRIL AND HYDROCHLOROTHIAZIDE 1 TABLET: 25; 20 TABLET ORAL at 08:09

## 2020-07-26 RX ADMIN — FLUCONAZOLE 150 MG: 100 TABLET ORAL at 12:12

## 2020-07-26 RX ADMIN — CEFAZOLIN SODIUM 2 G: 100 INJECTION, POWDER, LYOPHILIZED, FOR SOLUTION INTRAVENOUS at 02:39

## 2020-07-26 RX ADMIN — Medication 10 ML: at 14:12

## 2020-07-26 RX ADMIN — Medication 10 ML: at 21:19

## 2020-07-26 RX ADMIN — POTASSIUM CHLORIDE 20 MEQ: 20 TABLET, EXTENDED RELEASE ORAL at 08:09

## 2020-07-26 RX ADMIN — Medication 10 ML: at 21:20

## 2020-07-26 NOTE — ROUTINE PROCESS
Bedside and Verbal shift change report received from Raven Cooper (offgoing nurse). Report included the following information SBAR, Kardex, Procedure Summary and Recent Results. Opportunity for questions provided. Saline IV drip verified at bedside with oncoming RN.

## 2020-07-26 NOTE — ROUTINE PROCESS
Bedside and Verbal shift change report given to Jensen Lopez (oncoming nurse) by Windy Klein RN (offgoing nurse). Report included the following information SBAR, Kardex, Procedure Summary, Intake/Output, MAR and Recent Results.

## 2020-07-26 NOTE — ROUTINE PROCESS
Bedside and Verbal shift change report given to [address] [place] [address] (oncoming nurse) by Eldonna Moritz RN (offgoing nurse). Report included the following information SBAR, Kardex, Intake/Output, MAR and Recent Results.

## 2020-07-26 NOTE — PROGRESS NOTES
Hospitalist Progress Note     Admit Date:  2020  7:36 AM   Name:  Willard Goltz   Age:  64 y.o.  :  1960   MRN:  [id number]   PCP:  Kilo Zelaya MD  Treatment Team: Attending Provider: Anil Gilliam DO; Care Manager: Juan Martinez RN; Utilization Review: Herminio Mcmahon RN; Consulting Provider: Marcus Colindres NP; Consulting Provider: Opal Cason MD    Subjective:   HPI and or CC:  From history and physical HPI:  64 yr old  female patient who follows up with [facility], off her blood pressure medication for past 1 week. Known history of hypertension, morbid obesity, arthritis, sciatica and spine issues.     Pt says she is off blood pressure meds for a week. Since past 4 days started having sharp pain rt shoulder blade , then both shoulder blade region, constant, moderate intensity, not improving even after several doses of nsaid. Says also has sciatica pain more on the rt lower extremity. Started having vomiting, non bilious , about 7 episodes since last night. Dizziness, on and off since past 4 days ,since her blood pressures have been up.     Pt otherwise doesn't c/o head or shortness of breath or any left sided chest pain.     Wbc 19,k 3, bp 219/117  CT OF THE CHEST ABDOMEN AND PELVIS  1) Trace right pleural fusion. 2) Benign fat-containing 5.5 cm right adrenal adenoma.     Pt presently on cardene drip for elevated bp.     Pt will be admitted to ICU for malignant htn, hypokalemia and does meet the criteria for SIRS    :  Chief complaint : Thoracic mid scapular back pain  Feeling some better today. Neurosurgeon informed her he did not think she was an operative candidate at least for the time being. Leukocytosis worse today. Repeat blood cultures from  no growth to date. PICC line successfully inserted yesterday.   Weakness right upper extremity unable to lift arm past shoulder heightappears to have Right shoulder weakness denies pain with both active and passive range of motion. .  Serum potassium improved creatinine stable. Will likely need 8 weeks IV antibiotic therapy. Current Ancef. Objective:     Patient Vitals for the past 24 hrs:   Temp Pulse Resp BP SpO2   07/26/20 0922 97.9 °F (36.6 °C) 79 18 151/89 92 %   07/26/20 0809  73  131/76    07/26/20 0405 98.2 °F (36.8 °C) 75 16 140/62 94 %   07/26/20 0027 98.3 °F (36.8 °C) 73 18 (!) 162/94 94 %   07/25/20 2043 98.1 °F (36.7 °C) 75 17 147/80 94 %   07/25/20 1703 98.3 °F (36.8 °C) 78 18 142/75 97 %     Oxygen Therapy  O2 Sat (%): 92 % (07/26/20 0922)  Pulse via Oximetry: 88 beats per minute (07/22/20 1559)  O2 Device: Room air (07/26/20 1212)  O2 Flow Rate (L/min): 2 l/min (07/23/20 0400)    Intake/Output Summary (Last 24 hours) at 7/26/2020 1302  Last data filed at 7/26/2020 1212  Gross per 24 hour   Intake 2328.75 ml   Output 1200 ml   Net 1128.75 ml         REVIEW OF SYSTEMS: Comprehensive ROS performed and negative except as stated in HPI. Physical Examination:  Physical Exam  Vitals signs and nursing note reviewed. Constitutional:       General: She is not in acute distress. Appearance: Normal appearance. She is obese. HENT:      Nose: No rhinorrhea. Mouth/Throat:      Mouth: Mucous membranes are moist.      Pharynx: No oropharyngeal exudate. Eyes:      General: No scleral icterus. Extraocular Movements: Extraocular movements intact. Neck:      Musculoskeletal: Neck supple. No neck rigidity. Cardiovascular:      Pulses: Normal pulses. Heart sounds: Normal heart sounds. No friction rub. Pulmonary:      Breath sounds: No stridor. No rhonchi or rales. Abdominal:      General: There is no distension. Tenderness: There is no abdominal tenderness. There is no guarding. Musculoskeletal:         General: No signs of injury. Comments: Right shoulder/right upper extremity weakness without pain.   PICC line left upper extremity. Skin:     General: Skin is warm. Capillary Refill: Capillary refill takes less than 2 seconds. Coloration: Skin is not jaundiced. Neurological:      General: No focal deficit present. Cranial Nerves: No cranial nerve deficit. Psychiatric:         Behavior: Behavior normal.         Judgment: Judgment normal.         Data Review:  I have reviewed all labs, meds, telemetry events, and studies from the last 24 hours. Recent Results (from the past 24 hour(s))   CBC WITH AUTOMATED DIFF    Collection Time: 07/26/20  4:23 AM   Result Value Ref Range    WBC 24.4 (H) 4.3 - 11.1 K/uL    RBC 4.02 (L) 4.05 - 5.2 M/uL    HGB 11.6 (L) 11.7 - 15.4 g/dL    HCT 35.8 35.8 - 46.3 %    MCV 89.1 79.6 - 97.8 FL    MCH 28.9 26.1 - 32.9 PG    MCHC 32.4 31.4 - 35.0 g/dL    RDW 16.2 (H) 11.9 - 14.6 %    PLATELET 474 217 - 513 K/uL    MPV 12.8 (H) 9.4 - 12.3 FL    ABSOLUTE NRBC 0.00 0.0 - 0.2 K/uL    DF AUTOMATED      NEUTROPHILS 67 43 - 78 %    LYMPHOCYTES 18 13 - 44 %    MONOCYTES 6 4.0 - 12.0 %    EOSINOPHILS 1 0.5 - 7.8 %    BASOPHILS 1 0.0 - 2.0 %    IMMATURE GRANULOCYTES 7 (H) 0.0 - 5.0 %    ABS. NEUTROPHILS 16.3 (H) 1.7 - 8.2 K/UL    ABS. LYMPHOCYTES 4.4 0.5 - 4.6 K/UL    ABS. MONOCYTES 1.4 (H) 0.1 - 1.3 K/UL    ABS. EOSINOPHILS 0.3 0.0 - 0.8 K/UL    ABS. BASOPHILS 0.2 0.0 - 0.2 K/UL    ABS. IMM. GRANS.  1.8 (H) 0.0 - 0.5 K/UL   METABOLIC PANEL, BASIC    Collection Time: 07/26/20  4:23 AM   Result Value Ref Range    Sodium 140 136 - 145 mmol/L    Potassium 4.2 3.5 - 5.1 mmol/L    Chloride 103 98 - 107 mmol/L    CO2 33 (H) 21 - 32 mmol/L    Anion gap 4 (L) 7 - 16 mmol/L    Glucose 92 65 - 100 mg/dL    BUN 17 6 - 23 MG/DL    Creatinine 0.93 0.6 - 1.0 MG/DL    GFR est AA >60 >60 ml/min/1.73m2    GFR est non-AA >60 >60 ml/min/1.73m2    Calcium 8.6 8.3 - 10.4 MG/DL        All Micro Results     Procedure Component Value Units Date/Time    CULTURE, BLOOD [[id number]] Collected:  07/23/20 0353    Order Status: Completed Specimen:  Blood Updated:  07/26/20 0645     Special Requests: --        LEFT  HAND       Culture result: NO GROWTH 3 DAYS       CULTURE, BLOOD [[id number]] Collected:  07/23/20 0359    Order Status:  Completed Specimen:  Blood Updated:  07/26/20 0645     Special Requests: --        RIGHT  HAND       Culture result: NO GROWTH 3 DAYS       CULTURE, BLOOD [[id number]]  (Abnormal) Collected:  07/20/20 1828    Order Status:  Completed Specimen:  Blood Updated:  07/23/20 0737     Special Requests: --        RIGHT  HAND       GRAM STAIN       GRAM POS COCCI IN CLUSTERS                  AEROBIC AND ANAEROBIC BOTTLES                  RESULTS VERIFIED, PHONED TO AND READ BACK BY NADIA Her RN ON 7.21.2020 AT 0957,             Culture result: STAPHYLOCOCCUS AUREUS               For Susceptibility Refer to Culture  [id number]      CULTURE, BLOOD [[id number]]  (Abnormal)  (Susceptibility) Collected:  07/20/20 1740    Order Status:  Completed Specimen:  Blood Updated:  07/23/20 0736     Special Requests: --        LEFT  HAND       GRAM STAIN       GRAM POS COCCI IN CLUSTERS                  AEROBIC AND ANAEROBIC BOTTLES                  RESULTS VERIFIED, PHONED TO AND READ BACK BY NADIA Her RN ON 7.21.2020 AT 0957,             Culture result: STAPHYLOCOCCUS AUREUS         REFER TO Saurabh Gonsalez Dr ACCESSION [id number]     BLOOD CULTURE ID PANEL [[id number]]  (Abnormal) Collected:  07/20/20 1740    Order Status:  Completed Specimen:  Blood Updated:  07/21/20 1121     Acc. no. from Micro Order [id number]     Staphylococcus Detected        Staphylococcus aureus Detected        Comment: RESULTS VERIFIED, PHONED TO AND READ BACK BY  NADIA Her RN ON 7/21/20 @0957,           mecA (Methicillin-Resistance Genes) NOT DETECTED        INTERPRETATION       Gram positive cocci in clusters, identified in realtime PCR as probable MSSA.            Comment: Recommend discontinuing IV vancomycin starting cefazolin or nafcillin if patient not on beta-lactam therapy. Infectious Diseases Consult recommended in adult patients. THIS TEST DOES NOT REPLACE SENSITIVITY TESTING.              Current Meds:  Current Facility-Administered Medications   Medication Dose Route Frequency    sodium chloride (NS) flush 10 mL  10 mL InterCATHeter Q8H    sodium chloride (NS) flush 10 mL  10 mL InterCATHeter PRN    0.9% sodium chloride infusion  75 mL/hr IntraVENous CONTINUOUS    ketorolac (TORADOL) injection 30 mg  30 mg IntraVENous Q6H PRN    pantoprazole (PROTONIX) tablet 40 mg  40 mg Oral ACB    orphenadrine citrate (NORFLEX) sr tablet 100 mg  100 mg Oral BID    polyethylene glycol (MIRALAX) packet 17 g  17 g Oral DAILY    polyethylene glycol (MIRALAX) packet 17 g  17 g Oral TID    docusate sodium (COLACE) capsule 100 mg  100 mg Oral DAILY    ceFAZolin (ANCEF) 2 g/20 mL in sterile water IV syringe  2 g IntraVENous Q8H    lidocaine (XYLOCAINE) 2 % viscous solution 15 mL  15 mL Mouth/Throat PRN    traMADoL (ULTRAM) tablet 50 mg  50 mg Oral Q6H PRN    hydrALAZINE (APRESOLINE) tablet 10 mg  10 mg Oral Q6H PRN    potassium chloride (K-DUR, KLOR-CON) SR tablet 20 mEq  20 mEq Oral DAILY    alum-mag hydroxide-simeth (MYLANTA) oral suspension 30 mL  30 mL Oral Q4H PRN    lisinopril-hydroCHLOROthiazide (PRINZIDE, ZESTORETIC) 20-25 mg per tablet 1 Tab  1 Tab Oral DAILY    sodium chloride (NS) flush 5-40 mL  5-40 mL IntraVENous Q8H    sodium chloride (NS) flush 5-40 mL  5-40 mL IntraVENous PRN    acetaminophen (TYLENOL) tablet 650 mg  650 mg Oral Q4H PRN    oxyCODONE-acetaminophen (PERCOCET) 5-325 mg per tablet 1 Tab  1 Tab Oral Q4H PRN    morphine injection 1 mg  1 mg IntraVENous Q4H PRN    naloxone (NARCAN) injection 0.4 mg  0.4 mg IntraVENous PRN    diphenhydrAMINE (BENADRYL) injection 12.5 mg  12.5 mg IntraVENous Q4H PRN    ondansetron (ZOFRAN) injection 4 mg  4 mg IntraVENous Q4H PRN    bisacodyL (DULCOLAX) tablet 5 mg  5 mg Oral DAILY PRN    magnesium oxide (MAG-OX) tablet 400 mg  400 mg Oral BID       Diet:  DIET CARDIAC    Other Studies (last 24 hours):  No results found. Assessment and Plan:     Hospital Problems as of 7/26/2020 Never Reviewed          Codes Class Noted - Resolved POA    * (Principal) Osteomyelitis of spine ([facility] ICD-10-CM: M46.20  ICD-9-CM: 730.28  7/25/2020 - Present Unknown        Constipation ICD-10-CM: K59.00  ICD-9-CM: 564.00  7/22/2020 - Present Unknown        Nausea and vomiting ICD-10-CM: R11.2  ICD-9-CM: 787.01  7/22/2020 - Present Unknown        Sepsis due to methicillin susceptible Staphylococcus aureus (MSSA) without acute organ dysfunction ([facility] ICD-10-CM: A41.01  ICD-9-CM: 038.11, 995.91  7/22/2020 - Present Unknown        Bacteremia ICD-10-CM: R78.81  ICD-9-CM: 790.7  7/21/2020 - Present Unknown        Hypokalemia ICD-10-CM: E87.6  ICD-9-CM: 276.8  7/20/2020 - Present Unknown        HTN (hypertension), malignant ICD-10-CM: I10  ICD-9-CM: 401.0  7/20/2020 - Present Unknown        Arthritis ICD-10-CM: M19.90  ICD-9-CM: 716.90  7/20/2020 - Present Unknown        Morbidly obese ([facility] ICD-10-CM: E66.01  ICD-9-CM: 278.01  7/20/2020 - Present Unknown        Adrenal adenoma, right ICD-10-CM: D35.01  ICD-9-CM: 227.0  7/20/2020 - Present Unknown    Overview Signed 7/20/2020  2:53 PM by Leti Rangel MD     Benign fat-containing 5.5 cm right adrenal adenoma             SIRS (systemic inflammatory response syndrome) ([facility] ICD-10-CM: R65.10  ICD-9-CM: 995.90  7/20/2020 - Present Unknown              A/P:      --Sepsis due to MSSA not complicated by organ dysfunctionappreciate ID recommendations/follow up. Repeat blood cultures negative at 72 hours. PICC line insertion 7/25-- may need 8 weeks IV antibioticsplease see ID recommendationscefazolin. crp and esr performed this hospital stay.  Follow up crp am (less recent)    --Thoracic spine osteomyelitis with possible paravertebral abscess multilevel most obvious findings appear to be T4-T8 see report. Neurosurgery has seen patient and recommended intravenous antibiotic management and follow-upas per ID may need \"washout\". --- Malignant hypertension--resolved with resumption of home ACE/thiazide diuretic combination with magnesium supplementation. -\"incidentaloma\"-Adrenal adenoma-Outpatient follow-up imagingbenign appearing comment regarding fat-containingunchanged      Addendum symptoms of vaginal candidiasissingle dose Diflucan and observe. DC planning/Dispo: Pending recommended duration IV antibiotics see above. DVT ppx: Subcutaneous heparin    Code status: Full code  Medical decision maker: Selffamily contacts on file      Signed:  Burton BISHOP

## 2020-07-26 NOTE — PROGRESS NOTES
Neurosurgery progress note    Patient feels stronger today, OOB to chair  Less fatigue, less back pain  Strength full  No clonus    A/P  Thoracic paraspinal abscess with epidural extension  - There is stenosis on MRI without severe cord compression or signal change  - Would recommend laminectomy for decompression in the setting of failed abx therapy at 4-6 weeks, or development of neurologic symptoms  - Patient understands these signs, knows to return immediately if so, otherwise she agrees in not pursuing surgical intervention at this time. Understands the r/b/a to both surgery and to waiting.  She feels well and would like to go home when able  - Ok to DC , recommend outpatient follow up in 4-6 weeks or sooner if needed per medicine / ID

## 2020-07-26 NOTE — ROUTINE PROCESS
Verbal bedside report given to Mp Sosa oncoming RN. Patient's situation, background, assessment and recommendations provided. Opportunity for questions provided. Oncoming RN assumed care of patient.

## 2020-07-27 LAB
ANION GAP SERPL CALC-SCNC: 6 MMOL/L (ref 7–16)
BASOPHILS # BLD: 0.2 K/UL (ref 0–0.2)
BASOPHILS NFR BLD: 1 % (ref 0–2)
BUN SERPL-MCNC: 14 MG/DL (ref 6–23)
CALCIUM SERPL-MCNC: 8.1 MG/DL (ref 8.3–10.4)
CHLORIDE SERPL-SCNC: 101 MMOL/L (ref 98–107)
CO2 SERPL-SCNC: 31 MMOL/L (ref 21–32)
CREAT SERPL-MCNC: 0.79 MG/DL (ref 0.6–1)
CRP SERPL-MCNC: 9.1 MG/DL (ref 0–0.9)
DIFFERENTIAL METHOD BLD: ABNORMAL
EOSINOPHIL # BLD: 0.4 K/UL (ref 0–0.8)
EOSINOPHIL NFR BLD: 2 % (ref 0.5–7.8)
ERYTHROCYTE [DISTWIDTH] IN BLOOD BY AUTOMATED COUNT: 16.3 % (ref 11.9–14.6)
GLUCOSE SERPL-MCNC: 98 MG/DL (ref 65–100)
HCT VFR BLD AUTO: 32 % (ref 35.8–46.3)
HGB BLD-MCNC: 10.6 G/DL (ref 11.7–15.4)
IMM GRANULOCYTES # BLD AUTO: 1.5 K/UL (ref 0–0.5)
IMM GRANULOCYTES NFR BLD AUTO: 7 % (ref 0–5)
LYMPHOCYTES # BLD: 3.7 K/UL (ref 0.5–4.6)
LYMPHOCYTES NFR BLD: 17 % (ref 13–44)
MCH RBC QN AUTO: 29.4 PG (ref 26.1–32.9)
MCHC RBC AUTO-ENTMCNC: 33.1 G/DL (ref 31.4–35)
MCV RBC AUTO: 88.9 FL (ref 79.6–97.8)
MONOCYTES # BLD: 1.3 K/UL (ref 0.1–1.3)
MONOCYTES NFR BLD: 6 % (ref 4–12)
NEUTS SEG # BLD: 14.9 K/UL (ref 1.7–8.2)
NEUTS SEG NFR BLD: 67 % (ref 43–78)
NRBC # BLD: 0 K/UL (ref 0–0.2)
PLATELET # BLD AUTO: 291 K/UL (ref 150–450)
PLATELET COMMENTS,PCOM: ADEQUATE
PMV BLD AUTO: 12 FL (ref 9.4–12.3)
POTASSIUM SERPL-SCNC: 3.9 MMOL/L (ref 3.5–5.1)
RBC # BLD AUTO: 3.6 M/UL (ref 4.05–5.2)
RBC MORPH BLD: ABNORMAL
RBC MORPH BLD: ABNORMAL
SODIUM SERPL-SCNC: 138 MMOL/L (ref 136–145)
WBC # BLD AUTO: 22 K/UL (ref 4.3–11.1)
WBC MORPH BLD: ABNORMAL

## 2020-07-27 PROCEDURE — 36591 DRAW BLOOD OFF VENOUS DEVICE: CPT

## 2020-07-27 PROCEDURE — 85025 COMPLETE CBC W/AUTO DIFF WBC: CPT

## 2020-07-27 PROCEDURE — 74011250636 HC RX REV CODE- 250/636: Performed by: INTERNAL MEDICINE

## 2020-07-27 PROCEDURE — 80048 BASIC METABOLIC PNL TOTAL CA: CPT

## 2020-07-27 PROCEDURE — 74011000250 HC RX REV CODE- 250: Performed by: INTERNAL MEDICINE

## 2020-07-27 PROCEDURE — 74011250637 HC RX REV CODE- 250/637: Performed by: FAMILY MEDICINE

## 2020-07-27 PROCEDURE — 74011250637 HC RX REV CODE- 250/637: Performed by: HOSPITALIST

## 2020-07-27 PROCEDURE — 86140 C-REACTIVE PROTEIN: CPT

## 2020-07-27 PROCEDURE — 74011250637 HC RX REV CODE- 250/637: Performed by: INTERNAL MEDICINE

## 2020-07-27 PROCEDURE — 74011250636 HC RX REV CODE- 250/636: Performed by: FAMILY MEDICINE

## 2020-07-27 PROCEDURE — 65270000029 HC RM PRIVATE

## 2020-07-27 RX ADMIN — TRAMADOL HYDROCHLORIDE 50 MG: 50 TABLET, FILM COATED ORAL at 08:46

## 2020-07-27 RX ADMIN — DOCUSATE SODIUM 100 MG: 100 CAPSULE, LIQUID FILLED ORAL at 08:32

## 2020-07-27 RX ADMIN — Medication 10 ML: at 02:58

## 2020-07-27 RX ADMIN — Medication 10 ML: at 13:48

## 2020-07-27 RX ADMIN — POTASSIUM CHLORIDE 20 MEQ: 20 TABLET, EXTENDED RELEASE ORAL at 08:32

## 2020-07-27 RX ADMIN — CEFAZOLIN SODIUM 2 G: 100 INJECTION, POWDER, LYOPHILIZED, FOR SOLUTION INTRAVENOUS at 02:57

## 2020-07-27 RX ADMIN — PANTOPRAZOLE SODIUM 40 MG: 40 TABLET, DELAYED RELEASE ORAL at 06:39

## 2020-07-27 RX ADMIN — MORPHINE SULFATE 1 MG: 2 INJECTION, SOLUTION INTRAMUSCULAR; INTRAVENOUS at 17:36

## 2020-07-27 RX ADMIN — Medication 10 ML: at 02:57

## 2020-07-27 RX ADMIN — ORPHENADRINE CITRATE 100 MG: 100 TABLET, EXTENDED RELEASE ORAL at 08:32

## 2020-07-27 RX ADMIN — LISINOPRIL AND HYDROCHLOROTHIAZIDE 1 TABLET: 25; 20 TABLET ORAL at 08:31

## 2020-07-27 RX ADMIN — MAGNESIUM GLUCONATE 500 MG ORAL TABLET 400 MG: 500 TABLET ORAL at 17:36

## 2020-07-27 RX ADMIN — OXYCODONE HYDROCHLORIDE AND ACETAMINOPHEN 1 TABLET: 5; 325 TABLET ORAL at 11:32

## 2020-07-27 RX ADMIN — CEFAZOLIN SODIUM 2 G: 100 INJECTION, POWDER, LYOPHILIZED, FOR SOLUTION INTRAVENOUS at 17:36

## 2020-07-27 RX ADMIN — MAGNESIUM GLUCONATE 500 MG ORAL TABLET 400 MG: 500 TABLET ORAL at 08:31

## 2020-07-27 RX ADMIN — ORPHENADRINE CITRATE 100 MG: 100 TABLET, EXTENDED RELEASE ORAL at 22:18

## 2020-07-27 RX ADMIN — CEFAZOLIN SODIUM 2 G: 100 INJECTION, POWDER, LYOPHILIZED, FOR SOLUTION INTRAVENOUS at 10:00

## 2020-07-27 RX ADMIN — Medication 10 ML: at 22:25

## 2020-07-27 NOTE — PROGRESS NOTES
Infectious Disease Progress Note    Today's Date: 2020   Admit Date: 2020    Impression:   · MSSA bacteremia (), TTE/DREA negative, source not clear, but does have a habit of picking her skin. Repeat [facility]  NG  · T4-T9 OM/disciitis with paravertebral abscess concerns   · Nausea/vomiting, intermittent  · Leukocytosis  · R shoulder decreased active ROM, normal passive ROM. Issue developed after RUE PICC attempt     Plan:     · Continue Cefazolin 2g IV q 8h. Duration 8 weeks, EOT 9/3/20. She is self-pay so CM working on home infusion aide. OPAT orders written. · Dispo home when pain controlled. ID appt in 6 weeks. May be able to end at 6 weeks or transition to oral abx pending labs and clinical status     Anti-infectives:   · Cefazolin -  · vanc -  · CTX -    Subjective:   Afebrile, back pain is better with regular toradol and tramadol    Allergies   Allergen Reactions    Lortab [Hydrocodone-Acetaminophen] Nausea and Vomiting        Review of Systems:  A comprehensive review of systems was negative except for that written in the History of Present Illness. Objective:     Visit Vitals  BP 99/62 (BP 1 Location: Right arm, BP Patient Position: At rest)   Pulse 91   Temp 98.2 °F (36.8 °C)   Resp 18   Ht 5' 4\" (1.626 m)   Wt 92.5 kg (203 lb 14.4 oz)   SpO2 92%   BMI 35.00 kg/m²     Temp (24hrs), Av °F (36.7 °C), Min:97.9 °F (36.6 °C), Max:98.2 °F (36.8 °C)     General:  Alert, cooperative, no acute distress, appears stated age, morbidly obese   Head:  Normocephalic, atraumatic    Eyes:  Anicteric, no drainage, not injected, EOMI   Throat: Mucus membranes moist OP clear   Lungs:   Clear throughout lung fields without increased work of breathing or audible wheezes   Heart:  Regular rate and rhythm, without audible murmur, rub, or gallop   Abdomen:   Soft, non-tender, no guarding, no distention, bowel sounds active   Extremities: Extremities normal, atraumatic, no cyanosis or edema. No obvious outward changes between shoulder blades   Pulses: 2+ and symmetric   Skin: Skin color, texture, turgor normal, no rashes or lesions. Lines/Devices: PIV         Data Review:     CBC:  Recent Labs     07/27/20 0511 07/26/20 0423 07/25/20 0355   WBC 22.0* 24.4* 20.8*   GRANS 67 67 68   MONOS 6 6 7   EOS 2 1 1   ANEU 14.9* 16.3* 14.1*   ABL 3.7 4.4 3.6   HGB 10.6* 11.6* 11.4*   HCT 32.0* 35.8 34.5*    308 250       BMP:  Recent Labs     07/27/20 0511 07/26/20 0423 07/25/20 0355   CREA 0.79 0.93 0.87   BUN 14 17 21    140 136   K 3.9 4.2 3.2*    103 98   CO2 31 33* 33*   AGAP 6* 4* 5*   GLU 98 92 141*       LFTS:  No results for input(s): TBILI, ALT, AP, TP, ALB in the last 72 hours.     No lab exists for component: SGOT    Microbiology:     All Micro Results     Procedure Component Value Units Date/Time    CULTURE, BLOOD [[id number]] Collected:  07/23/20 0353    Order Status:  Completed Specimen:  Blood Updated:  07/26/20 0645     Special Requests: --        LEFT  HAND       Culture result: NO GROWTH 3 DAYS       CULTURE, BLOOD [[id number]] Collected:  07/23/20 0359    Order Status:  Completed Specimen:  Blood Updated:  07/26/20 0645     Special Requests: --        RIGHT  HAND       Culture result: NO GROWTH 3 DAYS       CULTURE, BLOOD [[id number]]  (Abnormal) Collected:  07/20/20 1828    Order Status:  Completed Specimen:  Blood Updated:  07/23/20 0737     Special Requests: --        RIGHT  HAND       GRAM STAIN       GRAM POS COCCI IN CLUSTERS                  AEROBIC AND ANAEROBIC BOTTLES                  RESULTS VERIFIED, PHONED TO AND READ BACK BY [address] ON 7.21.2020 AT 0957,  [facility]           Culture result: STAPHYLOCOCCUS AUREUS               For Susceptibility Refer to Culture  [id number]      CULTURE, BLOOD [[id number]]  (Abnormal)  (Susceptibility) Collected:  07/20/20 1740    Order Status:  Completed Specimen:  Blood Updated:  07/23/20 0736     Special Requests: -- LEFT  HAND       GRAM STAIN       GRAM POS COCCI IN CLUSTERS                  AEROBIC AND ANAEROBIC BOTTLES                  RESULTS VERIFIED, PHONED TO AND READ BACK BY NADIA Her RN ON 7.21.2020 AT 0957,             Culture result: STAPHYLOCOCCUS AUREUS         REFER TO Saurabh Gonsalez Dr ACCESSION [id number]     BLOOD CULTURE ID PANEL [[id number]]  (Abnormal) Collected:  07/20/20 1740    Order Status:  Completed Specimen:  Blood Updated:  07/21/20 1121     Acc. no. from Micro Order [id number]     Staphylococcus Detected        Staphylococcus aureus Detected        Comment: RESULTS VERIFIED, PHONED TO AND READ BACK BY  NADIA Her RN ON 7/21/20 @0957,           mecA (Methicillin-Resistance Genes) NOT DETECTED        INTERPRETATION       Gram positive cocci in clusters, identified in realtime PCR as probable MSSA. Comment: Recommend discontinuing IV vancomycin starting cefazolin or nafcillin if patient not on beta-lactam therapy. Infectious Diseases Consult recommended in adult patients. THIS TEST DOES NOT REPLACE SENSITIVITY TESTING.              Imaging:   Reviewed    Signed By: Tk Soto NP     July 27, 2020

## 2020-07-27 NOTE — ROUTINE PROCESS
Bedside and Verbal report given to self by Miriam Galo RN. Report included SBAR, Kardex, ED Summary, Procedure Summary, Intake and Output and Cardiac Rhythm non-monitored.

## 2020-07-27 NOTE — PROGRESS NOTES
Care Management Interventions  PCP Verified by CM: Yes(Seen at [facility] 3 months ago)  Mode of Transport at Discharge: Other (see comment)(Rajan Nicholson  Other Relative  [phone]  )  Transition of Care Consult (CM Consult): Discharge Planning, [address]: Yes  Discharge Durable Medical Equipment: No  Physical Therapy Consult: No  Occupational Therapy Consult: No  Current Support Network: Other(Her 2 sons live with her in a house)  Confirm Follow Up Transport: Family  The Plan for Transition of Care is Related to the Following Treatment Goals : Home health  The Patient and/or Patient Representative was Provided with a Choice of Provider and Agrees with the Discharge Plan?: Yes  Name of the Patient Representative Who was Provided with a Choice of Provider and Agrees with the Discharge Plan: Patient  Freedom of Choice List was Provided with Basic Dialogue that Supports the Patient's Individualized Plan of Care/Goals, Treatment Preferences and Shares the Quality Data Associated with the Providers?: Yes  Discharge Location  Discharge Placement: Home with home health    CM received referral for IV antibiotic home therapy for 8 weeks. Pt is self pay. Contract for 8 week antibiotic therapy through [facility] being drawn up and reviewed by Case Management for financial coverage. CM to continue to follow.

## 2020-07-27 NOTE — PROGRESS NOTES
Hospitalist Progress Note     Admit Date:  2020  7:36 AM   Name:  Benitez Cai   Age:  64 y.o.  :  1960   MRN:  [id number]   PCP:  Giovanny Ag MD  Treatment Team: Attending Provider: Kilo Foss DO; Care Manager: Oscar Fernandez, RN; Utilization Review: Carolyn Waddell RN; Consulting Provider: Masoud Guerra NP; Consulting Provider: Gisella Ramirez MD    Subjective:   HPI and or CC:  From history and physical HPI:  64 yr old  female patient who follows up with [facility], off her blood pressure medication for past 1 week. Known history of hypertension, morbid obesity, arthritis, sciatica and spine issues.     Pt says she is off blood pressure meds for a week. Since past 4 days started having sharp pain rt shoulder blade , then both shoulder blade region, constant, moderate intensity, not improving even after several doses of nsaid. Says also has sciatica pain more on the rt lower extremity. Started having vomiting, non bilious , about 7 episodes since last night. Dizziness, on and off since past 4 days ,since her blood pressures have been up.     Pt otherwise doesn't c/o head or shortness of breath or any left sided chest pain.     Wbc 19,k 3, bp 219/117  CT OF THE CHEST ABDOMEN AND PELVIS  1) Trace right pleural fusion. 2) Benign fat-containing 5.5 cm right adrenal adenoma.     Pt presently on cardene drip for elevated bp.     Pt will be admitted to ICU for malignant htn, hypokalemia and does meet the criteria for SIRS    2020:  Chief complaint : Thoracic mid scapular back pain    Please see neurosurgery notethey do not feel that she will require surgical intervention. 8 weeks IV antibiotics planned.   She has significant pain today and is going to try to avoid use of IV narcotic but discharge held due to continued IV narcotic need and I suspect in the next 24-48 hours she may be able to be discharged with oral pain medications. Toradol is now off and this was helping her significantly I suspect due to anti-inflammatory effect. She does appear reliable and I think she will be capable of dosing her intravenous Ancef 3 times daily with 8-week therapy planned by infectious disease. She will need follow-up with neurosurgery and infectious disease when discharged. Objective:     Patient Vitals for the past 24 hrs:   Temp Pulse Resp BP SpO2   07/27/20 0831 98.2 °F (36.8 °C) 88 18 (!) 168/93 96 %   07/27/20 0427 98.2 °F (36.8 °C) 91 18 99/62 92 %   07/27/20 0110 97.9 °F (36.6 °C) 76 18 (!) 136/94 93 %   07/26/20 2237     95 %   07/26/20 2101 98.1 °F (36.7 °C) 79 18 178/90 94 %   07/26/20 1730 98 °F (36.7 °C) 91 16 163/72 97 %   07/26/20 1326 98 °F (36.7 °C) 81 16 156/86 91 %     Oxygen Therapy  O2 Sat (%): 96 % (07/27/20 0831)  Pulse via Oximetry: 84 beats per minute (07/26/20 2237)  O2 Device: Room air (07/27/20 1136)  O2 Flow Rate (L/min): 2 l/min (07/23/20 0400)    Intake/Output Summary (Last 24 hours) at 7/27/2020 1153  Last data filed at 7/27/2020 1000  Gross per 24 hour   Intake 2765.84 ml   Output 1600 ml   Net 1165.84 ml         REVIEW OF SYSTEMS: Comprehensive ROS performed and negative except as stated in HPI. Physical Examination:  Physical Exam  Vitals signs and nursing note reviewed. Constitutional:       Appearance: She is not toxic-appearing. Comments: Significant pain today   HENT:      Head: Normocephalic and atraumatic. Nose: No congestion or rhinorrhea. Mouth/Throat:      Mouth: Mucous membranes are moist.      Pharynx: Oropharynx is clear. No oropharyngeal exudate or posterior oropharyngeal erythema. Eyes:      Extraocular Movements: Extraocular movements intact. Conjunctiva/sclera: Conjunctivae normal.      Pupils: Pupils are equal, round, and reactive to light. Neck:      Musculoskeletal: Normal range of motion. No neck rigidity or muscular tenderness.    Cardiovascular: Pulses: Normal pulses. Heart sounds: Normal heart sounds. No friction rub. No gallop. Pulmonary:      Effort: No respiratory distress. Breath sounds: No stridor. No wheezing, rhonchi or rales. Abdominal:      General: Bowel sounds are normal. There is no distension. Palpations: Abdomen is soft. There is no mass. Musculoskeletal:         General: No signs of injury. Comments: Right shoulder/right upper extremity weakness without pain. PICC line left upper extremity. Skin:     General: Skin is dry. Capillary Refill: Capillary refill takes less than 2 seconds. Findings: No erythema. Neurological:      Mental Status: She is alert and oriented to person, place, and time. Sensory: No sensory deficit. Psychiatric:         Behavior: Behavior normal.         Thought Content: Thought content normal.         Data Review:  I have reviewed all labs, meds, telemetry events, and studies from the last 24 hours.     Recent Results (from the past 24 hour(s))   METABOLIC PANEL, BASIC    Collection Time: 07/27/20  5:11 AM   Result Value Ref Range    Sodium 138 136 - 145 mmol/L    Potassium 3.9 3.5 - 5.1 mmol/L    Chloride 101 98 - 107 mmol/L    CO2 31 21 - 32 mmol/L    Anion gap 6 (L) 7 - 16 mmol/L    Glucose 98 65 - 100 mg/dL    BUN 14 6 - 23 MG/DL    Creatinine 0.79 0.6 - 1.0 MG/DL    GFR est AA >60 >60 ml/min/1.73m2    GFR est non-AA >60 >60 ml/min/1.73m2    Calcium 8.1 (L) 8.3 - 10.4 MG/DL   CBC WITH AUTOMATED DIFF    Collection Time: 07/27/20  5:11 AM   Result Value Ref Range    WBC 22.0 (H) 4.3 - 11.1 K/uL    RBC 3.60 (L) 4.05 - 5.2 M/uL    HGB 10.6 (L) 11.7 - 15.4 g/dL    HCT 32.0 (L) 35.8 - 46.3 %    MCV 88.9 79.6 - 97.8 FL    MCH 29.4 26.1 - 32.9 PG    MCHC 33.1 31.4 - 35.0 g/dL    RDW 16.3 (H) 11.9 - 14.6 %    PLATELET 952 962 - 605 K/uL    MPV 12.0 9.4 - 12.3 FL    ABSOLUTE NRBC 0.00 0.0 - 0.2 K/uL    NEUTROPHILS 67 43 - 78 %    LYMPHOCYTES 17 13 - 44 %    MONOCYTES 6 4.0 - 12.0 %    EOSINOPHILS 2 0.5 - 7.8 %    BASOPHILS 1 0.0 - 2.0 %    IMMATURE GRANULOCYTES 7 (H) 0.0 - 5.0 %    ABS. NEUTROPHILS 14.9 (H) 1.7 - 8.2 K/UL    ABS. LYMPHOCYTES 3.7 0.5 - 4.6 K/UL    ABS. MONOCYTES 1.3 0.1 - 1.3 K/UL    ABS. EOSINOPHILS 0.4 0.0 - 0.8 K/UL    ABS. BASOPHILS 0.2 0.0 - 0.2 K/UL    ABS. IMM. GRANS.  1.5 (H) 0.0 - 0.5 K/UL    RBC COMMENTS MODERATE  TARGET CELLS        RBC COMMENTS SLIGHT  ANISOCYTOSIS + POIKILOCYTOSIS        WBC COMMENTS Result Confirmed By Smear      PLATELET COMMENTS ADEQUATE      DF AUTOMATED     C REACTIVE PROTEIN, QT    Collection Time: 07/27/20  5:11 AM   Result Value Ref Range    C-Reactive protein 9.1 (H) 0.0 - 0.9 mg/dL        All Micro Results     Procedure Component Value Units Date/Time    CULTURE, BLOOD [[id number]] Collected:  07/23/20 0353    Order Status:  Completed Specimen:  Blood Updated:  07/27/20 0959     Special Requests: --        LEFT  HAND       Culture result: NO GROWTH 4 DAYS       CULTURE, BLOOD [[id number]] Collected:  07/23/20 0359    Order Status:  Completed Specimen:  Blood Updated:  07/27/20 0959     Special Requests: --        RIGHT  HAND       Culture result: NO GROWTH 4 DAYS       CULTURE, BLOOD [[id number]]  (Abnormal) Collected:  07/20/20 1828    Order Status:  Completed Specimen:  Blood Updated:  07/23/20 0737     Special Requests: --        RIGHT  HAND       GRAM STAIN       GRAM POS COCCI IN CLUSTERS                  AEROBIC AND ANAEROBIC BOTTLES                  RESULTS VERIFIED, PHONED TO AND READ BACK BY NADIA [address] [place] [address] ON 7.21.2020 AT 0957,             Culture result: STAPHYLOCOCCUS AUREUS               For Susceptibility Refer to Culture  [id number]      CULTURE, BLOOD [[id number]]  (Abnormal)  (Susceptibility) Collected:  07/20/20 1740    Order Status:  Completed Specimen:  Blood Updated:  07/23/20 0736     Special Requests: --        LEFT  HAND       GRAM STAIN       GRAM POS COCCI IN CLUSTERS                  AEROBIC AND ANAEROBIC BOTTLES                  RESULTS VERIFIED, PHONED TO AND READ BACK BY NADIA Her RN ON 7.21.2020 AT 0957,             Culture result: STAPHYLOCOCCUS AUREUS         REFER TO [id number] [id number]     BLOOD CULTURE ID PANEL [[id number]]  (Abnormal) Collected:  07/20/20 1740    Order Status:  Completed Specimen:  Blood Updated:  07/21/20 1121     Acc. no. from Micro Order [id number]     Staphylococcus Detected        Staphylococcus aureus Detected        Comment: RESULTS VERIFIED, PHONED TO AND READ BACK BY  NADIA Her RN ON 7/21/20 @0957,           mecA (Methicillin-Resistance Genes) NOT DETECTED        INTERPRETATION       Gram positive cocci in clusters, identified in realtime PCR as probable MSSA. Comment: Recommend discontinuing IV vancomycin starting cefazolin or nafcillin if patient not on beta-lactam therapy. Infectious Diseases Consult recommended in adult patients. THIS TEST DOES NOT REPLACE SENSITIVITY TESTING.              Current Meds:  Current Facility-Administered Medications   Medication Dose Route Frequency    sodium chloride (NS) flush 10 mL  10 mL InterCATHeter Q8H    sodium chloride (NS) flush 10 mL  10 mL InterCATHeter PRN    pantoprazole (PROTONIX) tablet 40 mg  40 mg Oral ACB    orphenadrine citrate (NORFLEX) sr tablet 100 mg  100 mg Oral BID    polyethylene glycol (MIRALAX) packet 17 g  17 g Oral DAILY    polyethylene glycol (MIRALAX) packet 17 g  17 g Oral TID    docusate sodium (COLACE) capsule 100 mg  100 mg Oral DAILY    ceFAZolin (ANCEF) 2 g/20 mL in sterile water IV syringe  2 g IntraVENous Q8H    lidocaine (XYLOCAINE) 2 % viscous solution 15 mL  15 mL Mouth/Throat PRN    traMADoL (ULTRAM) tablet 50 mg  50 mg Oral Q6H PRN    hydrALAZINE (APRESOLINE) tablet 10 mg  10 mg Oral Q6H PRN    potassium chloride (K-DUR, KLOR-CON) SR tablet 20 mEq  20 mEq Oral DAILY    alum-mag hydroxide-simeth (MYLANTA) oral suspension 30 mL  30 mL Oral Q4H PRN  lisinopril-hydroCHLOROthiazide (PRINZIDE, ZESTORETIC) 20-25 mg per tablet 1 Tab  1 Tab Oral DAILY    sodium chloride (NS) flush 5-40 mL  5-40 mL IntraVENous Q8H    sodium chloride (NS) flush 5-40 mL  5-40 mL IntraVENous PRN    acetaminophen (TYLENOL) tablet 650 mg  650 mg Oral Q4H PRN    oxyCODONE-acetaminophen (PERCOCET) 5-325 mg per tablet 1 Tab  1 Tab Oral Q4H PRN    morphine injection 1 mg  1 mg IntraVENous Q4H PRN    naloxone (NARCAN) injection 0.4 mg  0.4 mg IntraVENous PRN    diphenhydrAMINE (BENADRYL) injection 12.5 mg  12.5 mg IntraVENous Q4H PRN    ondansetron (ZOFRAN) injection 4 mg  4 mg IntraVENous Q4H PRN    bisacodyL (DULCOLAX) tablet 5 mg  5 mg Oral DAILY PRN    magnesium oxide (MAG-OX) tablet 400 mg  400 mg Oral BID       Diet:  DIET CARDIAC    Other Studies (last 24 hours):  No results found.     Assessment and Plan:     Hospital Problems as of 7/27/2020 Never Reviewed          Codes Class Noted - Resolved POA    * (Principal) Osteomyelitis of spine ([facility] ICD-10-CM: M46.20  ICD-9-CM: 730.28  7/25/2020 - Present Unknown        Constipation ICD-10-CM: K59.00  ICD-9-CM: 564.00  7/22/2020 - Present Unknown        Nausea and vomiting ICD-10-CM: R11.2  ICD-9-CM: 787.01  7/22/2020 - Present Unknown        Sepsis due to methicillin susceptible Staphylococcus aureus (MSSA) without acute organ dysfunction ([facility] ICD-10-CM: A41.01  ICD-9-CM: 038.11, 995.91  7/22/2020 - Present Unknown        Bacteremia ICD-10-CM: R78.81  ICD-9-CM: 790.7  7/21/2020 - Present Unknown        Hypokalemia ICD-10-CM: E87.6  ICD-9-CM: 276.8  7/20/2020 - Present Unknown        HTN (hypertension), malignant ICD-10-CM: I10  ICD-9-CM: 401.0  7/20/2020 - Present Unknown        Arthritis ICD-10-CM: M19.90  ICD-9-CM: 716.90  7/20/2020 - Present Unknown        Morbidly obese ([facility] ICD-10-CM: E66.01  ICD-9-CM: 278.01  7/20/2020 - Present Unknown        Adrenal adenoma, right ICD-10-CM: D35.01  ICD-9-CM: 227.0  7/20/2020 - Present Unknown    Overview Signed 7/20/2020  2:53 PM by Benny Guadarrama MD     Benign fat-containing 5.5 cm right adrenal adenoma             SIRS (systemic inflammatory response syndrome) ([facility] ICD-10-CM: R65.10  ICD-9-CM: 995.90  7/20/2020 - Present Unknown              A/P:      --Sepsis due to MSSA not complicated by organ dysfunctionappreciate ID recommendations/follow up. Probable discharge home with home health, home IV antibiotic therapy for a total course of IV Ancef 8 weeks. Discharge held today secondary to her intractable thoracic pain requiring IV narcoticsdiscussed need to have analgesia effective without need for IV narcotics prior to discharge. Hopefully as soon as tomorrow. Titrated oral meds. --Thoracic spine osteomyelitis with possible paravertebral abscess multilevel most obvious findings appear to be T4-T8 see report. Neurosurgery has seen patient and recommended intravenous antibiotic management and follow-up as outpatient. Do not feel surgical invention will be required. --- Malignant hypertension--resolved with resumption of home ACE/thiazide diuretic combination with magnesium supplementation. This is unchanged      -\"incidentaloma\"-Adrenal adenoma-Outpatient follow-up imagingbenign appearing comment regarding fat-containingunchanged      Addendum symptoms of vaginal candidiasissingle dose Diflucan and observe. Clinically improved. DC planning/Dispo: Pending recommended duration IV antibiotics see above. DVT ppx: Subcutaneous heparin    Code status: Full code  Medical decision maker: Selffamily contacts on file      Signed:  Burton BISHOP

## 2020-07-27 NOTE — PROGRESS NOTES
Comprehensive Nutrition Assessment    Type and Reason for Visit: Initial, RD nutrition re-screen/LOS    Nutrition Assessment:  Pt presented with shoulder pain and nausea and vomiting. She is found to have osteomyelitis of spine, adrenal adenoma, SIRS, HTN and bacterium. She has been recommended IV antibiotic and follow outpatient with neurosurgery. Pt reports she is eating although not the hospital food. She really dislikes it. Her kids are bringing in meals and snack options such as yogurts, puddings. She says she is eating well and her N/V has resolved.     Estimated Daily Nutrient Needs:  Energy (kcal):  3081-0986 (MSJ*1.2+/-10%)  Protein (g):   (20% kcals)       Fluid (ml/day):  1 ml/kcal    Nutrition Related Findings:  Edema: BLE 1+      Wounds:    None       Current Nutrition Therapies:   DIET CARDIAC Regular    Anthropometric Measures:  · Height:  5' 4\" (162.6 cm)  · Current Body Wt:  116 kg (255 lb 11.7 oz)     · Ideal Body Wt:  120:  213.1 %   · BMI Categories:  Obese class 3 (BMI 40.0 or greater)       Nutrition Diagnosis:   No nutritional diagnosis at this time     Nutrition Interventions:   Food and/or Nutrient Delivery: Continue current diet  Nutrition Education and Counseling: No recommendations at this time  Coordination of Nutrition Care: No recommendation at this time    Goals:  Pt continues to meet 75% of nutritional needs over 7 days       Nutrition Monitoring and Evaluation:   Food/Nutrient Intake Outcomes: Food and nutrient intake    Discharge Planning:    No discharge needs at this time     Electronically signed by Jos Vasques MS, RD, LD on 7/27/2020 at 12:08 PM.  Contact: [phone]

## 2020-07-27 NOTE — ROUTINE PROCESS
Verbal bedside report given to Tania Koo onckrupa RN. Patient's situation, background, assessment and recommendations provided. Opportunity for questions provided. Oncoming RN assumed care of patient. Saline IV drip verified at bedside with oncoming RN.

## 2020-07-28 ENCOUNTER — HOME HEALTH ADMISSION (OUTPATIENT)
Dept: HOME HEALTH SERVICES | Facility: HOME HEALTH | Age: 60
End: 2020-07-28
Payer: SUBSIDIZED

## 2020-07-28 VITALS
SYSTOLIC BLOOD PRESSURE: 141 MMHG | TEMPERATURE: 97.9 F | WEIGHT: 261.7 LBS | BODY MASS INDEX: 44.68 KG/M2 | DIASTOLIC BLOOD PRESSURE: 86 MMHG | RESPIRATION RATE: 18 BRPM | OXYGEN SATURATION: 94 % | HEIGHT: 64 IN | HEART RATE: 76 BPM

## 2020-07-28 PROBLEM — R78.81 BACTEREMIA: Status: RESOLVED | Noted: 2020-07-21 | Resolved: 2020-07-28

## 2020-07-28 PROBLEM — A41.01 SEPSIS DUE TO METHICILLIN SUSCEPTIBLE STAPHYLOCOCCUS AUREUS (MSSA) WITHOUT ACUTE ORGAN DYSFUNCTION (HCC): Status: RESOLVED | Noted: 2020-07-22 | Resolved: 2020-07-28

## 2020-07-28 LAB
ANION GAP SERPL CALC-SCNC: 6 MMOL/L (ref 7–16)
BACTERIA SPEC CULT: NORMAL
BACTERIA SPEC CULT: NORMAL
BASOPHILS # BLD: 0.1 K/UL (ref 0–0.2)
BASOPHILS NFR BLD: 0 % (ref 0–2)
BUN SERPL-MCNC: 9 MG/DL (ref 6–23)
CALCIUM SERPL-MCNC: 8.7 MG/DL (ref 8.3–10.4)
CHLORIDE SERPL-SCNC: 100 MMOL/L (ref 98–107)
CO2 SERPL-SCNC: 31 MMOL/L (ref 21–32)
CREAT SERPL-MCNC: 0.85 MG/DL (ref 0.6–1)
DIFFERENTIAL METHOD BLD: ABNORMAL
EOSINOPHIL # BLD: 0.2 K/UL (ref 0–0.8)
EOSINOPHIL NFR BLD: 1 % (ref 0.5–7.8)
ERYTHROCYTE [DISTWIDTH] IN BLOOD BY AUTOMATED COUNT: 15.9 % (ref 11.9–14.6)
GLUCOSE SERPL-MCNC: 97 MG/DL (ref 65–100)
HCT VFR BLD AUTO: 33.8 % (ref 35.8–46.3)
HGB BLD-MCNC: 10.8 G/DL (ref 11.7–15.4)
IMM GRANULOCYTES # BLD AUTO: 1.3 K/UL (ref 0–0.5)
IMM GRANULOCYTES NFR BLD AUTO: 5 % (ref 0–5)
LYMPHOCYTES # BLD: 3.3 K/UL (ref 0.5–4.6)
LYMPHOCYTES NFR BLD: 13 % (ref 13–44)
MCH RBC QN AUTO: 28.5 PG (ref 26.1–32.9)
MCHC RBC AUTO-ENTMCNC: 32 G/DL (ref 31.4–35)
MCV RBC AUTO: 89.2 FL (ref 79.6–97.8)
MONOCYTES # BLD: 1.5 K/UL (ref 0.1–1.3)
MONOCYTES NFR BLD: 6 % (ref 4–12)
NEUTS SEG # BLD: 19.1 K/UL (ref 1.7–8.2)
NEUTS SEG NFR BLD: 75 % (ref 43–78)
NRBC # BLD: 0 K/UL (ref 0–0.2)
PLATELET # BLD AUTO: 342 K/UL (ref 150–450)
PMV BLD AUTO: 12 FL (ref 9.4–12.3)
POTASSIUM SERPL-SCNC: 3.9 MMOL/L (ref 3.5–5.1)
RBC # BLD AUTO: 3.79 M/UL (ref 4.05–5.2)
SERVICE CMNT-IMP: NORMAL
SERVICE CMNT-IMP: NORMAL
SODIUM SERPL-SCNC: 137 MMOL/L (ref 136–145)
WBC # BLD AUTO: 25.5 K/UL (ref 4.3–11.1)

## 2020-07-28 PROCEDURE — 74011250636 HC RX REV CODE- 250/636: Performed by: INTERNAL MEDICINE

## 2020-07-28 PROCEDURE — 80048 BASIC METABOLIC PNL TOTAL CA: CPT

## 2020-07-28 PROCEDURE — 74011250637 HC RX REV CODE- 250/637: Performed by: HOSPITALIST

## 2020-07-28 PROCEDURE — 74011250637 HC RX REV CODE- 250/637: Performed by: INTERNAL MEDICINE

## 2020-07-28 PROCEDURE — C1751 CATH, INF, PER/CENT/MIDLINE: HCPCS

## 2020-07-28 PROCEDURE — 36592 COLLECT BLOOD FROM PICC: CPT

## 2020-07-28 PROCEDURE — 74011000250 HC RX REV CODE- 250: Performed by: INTERNAL MEDICINE

## 2020-07-28 PROCEDURE — 74011250636 HC RX REV CODE- 250/636: Performed by: FAMILY MEDICINE

## 2020-07-28 PROCEDURE — 85025 COMPLETE CBC W/AUTO DIFF WBC: CPT

## 2020-07-28 PROCEDURE — 74011250637 HC RX REV CODE- 250/637: Performed by: FAMILY MEDICINE

## 2020-07-28 RX ORDER — OXYCODONE AND ACETAMINOPHEN 5; 325 MG/1; MG/1
1 TABLET ORAL
Qty: 18 TAB | Refills: 0 | Status: SHIPPED | OUTPATIENT
Start: 2020-07-28 | End: 2020-07-31

## 2020-07-28 RX ORDER — POLYETHYLENE GLYCOL 3350 17 G/17G
17 POWDER, FOR SOLUTION ORAL DAILY PRN
Status: DISCONTINUED | OUTPATIENT
Start: 2020-07-28 | End: 2020-07-28 | Stop reason: HOSPADM

## 2020-07-28 RX ORDER — CEFAZOLIN SODIUM/WATER 2 G/20 ML
2 SYRINGE (ML) INTRAVENOUS EVERY 8 HOURS
Qty: 1 ML | Refills: 0 | Status: SHIPPED
Start: 2020-07-28 | End: 2021-04-13

## 2020-07-28 RX ORDER — POLYETHYLENE GLYCOL 3350 17 G/17G
17 POWDER, FOR SOLUTION ORAL DAILY
Qty: 30 PACKET | Refills: 0 | Status: SHIPPED | OUTPATIENT
Start: 2020-07-28 | End: 2021-04-13

## 2020-07-28 RX ADMIN — LISINOPRIL AND HYDROCHLOROTHIAZIDE 1 TABLET: 25; 20 TABLET ORAL at 08:37

## 2020-07-28 RX ADMIN — MORPHINE SULFATE 1 MG: 2 INJECTION, SOLUTION INTRAMUSCULAR; INTRAVENOUS at 02:57

## 2020-07-28 RX ADMIN — MAGNESIUM GLUCONATE 500 MG ORAL TABLET 400 MG: 500 TABLET ORAL at 08:37

## 2020-07-28 RX ADMIN — PANTOPRAZOLE SODIUM 40 MG: 40 TABLET, DELAYED RELEASE ORAL at 05:20

## 2020-07-28 RX ADMIN — CEFAZOLIN SODIUM 2 G: 100 INJECTION, POWDER, LYOPHILIZED, FOR SOLUTION INTRAVENOUS at 03:03

## 2020-07-28 RX ADMIN — POLYETHYLENE GLYCOL 3350 17 G: 17 POWDER, FOR SOLUTION ORAL at 08:40

## 2020-07-28 RX ADMIN — Medication 10 ML: at 05:14

## 2020-07-28 RX ADMIN — CEFAZOLIN SODIUM 2 G: 100 INJECTION, POWDER, LYOPHILIZED, FOR SOLUTION INTRAVENOUS at 09:25

## 2020-07-28 RX ADMIN — DOCUSATE SODIUM 100 MG: 100 CAPSULE, LIQUID FILLED ORAL at 08:37

## 2020-07-28 RX ADMIN — TRAMADOL HYDROCHLORIDE 50 MG: 50 TABLET, FILM COATED ORAL at 14:51

## 2020-07-28 RX ADMIN — ORPHENADRINE CITRATE 100 MG: 100 TABLET, EXTENDED RELEASE ORAL at 08:37

## 2020-07-28 RX ADMIN — TRAMADOL HYDROCHLORIDE 50 MG: 50 TABLET, FILM COATED ORAL at 08:40

## 2020-07-28 RX ADMIN — POTASSIUM CHLORIDE 20 MEQ: 20 TABLET, EXTENDED RELEASE ORAL at 08:37

## 2020-07-28 RX ADMIN — TRAMADOL HYDROCHLORIDE 50 MG: 50 TABLET, FILM COATED ORAL at 00:53

## 2020-07-28 RX ADMIN — Medication 10 ML: at 13:41

## 2020-07-28 NOTE — ROUTINE PROCESS
Bedside and Verbal report given to Karla Montgomery RN and Odalis Anderson RN by self. Report included SBAR, Kardex, ED summary, procedure summary, recent results and cardiac rhythm non-monitored.

## 2020-07-28 NOTE — DISCHARGE SUMMARY
Hospitalist Discharge Summary     Admit Date:  2020  7:36 AM   Name:  Willard Goltz   Age:  64 y.o.  :  1960   MRN:  [id number]   PCP:  Kilo Zelaya MD  Treatment Team: Attending Provider: Biju Quezada MD; Care Manager: Juan Martinez RN; Utilization Review: Herminio Mcmahon RN; Consulting Provider: Marcus Colindres NP; Consulting Provider: Opal Cason MD    Problem List for this Hospitalization:  Hospital Problems as of 2020 Never Reviewed          Codes Class Noted - Resolved POA    * (Principal) Osteomyelitis of spine ([facility] ICD-10-CM: M46.20  ICD-9-CM: 730.28  2020 - Present Yes        Constipation ICD-10-CM: K59.00  ICD-9-CM: 564.00  2020 - Present Yes        Nausea and vomiting ICD-10-CM: R11.2  ICD-9-CM: 787.01  2020 - Present Yes        Hypokalemia ICD-10-CM: E87.6  ICD-9-CM: 276.8  2020 - Present Yes        HTN (hypertension), malignant ICD-10-CM: I10  ICD-9-CM: 401.0  2020 - Present Yes        Arthritis ICD-10-CM: M19.90  ICD-9-CM: 716.90  2020 - Present Yes        Morbidly obese ([facility] ICD-10-CM: E66.01  ICD-9-CM: 278.01  2020 - Present Yes        Adrenal adenoma, right ICD-10-CM: D35.01  ICD-9-CM: 227.0  2020 - Present Yes    Overview Signed 2020  2:53 PM by Sophie Tang MD     Benign fat-containing 5.5 cm right adrenal adenoma             SIRS (systemic inflammatory response syndrome) ([facility] ICD-10-CM: R65.10  ICD-9-CM: 995.90  2020 - Present Yes        RESOLVED: Sepsis due to methicillin susceptible Staphylococcus aureus (MSSA) without acute organ dysfunction ([facility] ICD-10-CM: A41.01  ICD-9-CM: 038.11, 995.91  2020 - 2020 Yes        RESOLVED: Bacteremia ICD-10-CM: R78.81  ICD-9-CM: 790.7  2020 - 2020 Yes              Hospital Course:  Ms. Mariela Gonzalez is a nice 63 y/o WF with a h/o HTN, morbid obesity, OA and sciatica who was admitted to our service on 2020 with HTN urgency and scapular pain. CT c/a/p was negative for dissection but showed adrenal incidentaloma with a benign appearance. She was admitted to ICU for Cardene drip for BP control. She was on empiric Ceftriaxone because she met SIRS criteria. She improved. Blood cultures grew MSSA so ID was consulted. TTE and DREA were negative for evidence of vegetation. Her back pain worsened so T/L-spine MRIs were done which showed T4-9 OM with possible paravertebral abscess. Neurosurgery consulted and given her improvement it was decided to pursue antibiotics for 8 weeks (EOT 9/3) and surgical intervention if this fails or she develops neurological complications. She will do Ancef 2g every 8 hours until 9/3. She was planned for discharge on 7/27 but had significant pain requiring IV medications so this was held. Today she is ambulating independently, has been to BR sevreal times and now up to the chair with no assistance. Her pain has improved and she feels stronger and better than yesterday. She is requesting to discharge home today with IV antibiotics. Advised on risks of narcotics and even NSAIDs, but recognize she does some level of pain control. Also needs bowel regimen. D/w CM and antibiotics/HH are being arranged. She needs PCP f/u within 1 week for BP check and hospital follow up. She has ID follow up scheduled on 9/3/2020 at 10:40am.  Her hospital course was otherwise unremarkable and she is medically stable for discharge. Disposition: Home Health Care Svc  Activity: Activity as tolerated  Diet: DIET CARDIAC Regular  Code Status: Full Code    Follow Up Orders: Follow-up Appointments   Procedures    FOLLOW UP VISIT Appointment in: Anastacia Mccabe PCP -- 1 week, hospital f/u Infectious Diseases -- 9/3/2020 @ 10:40am     PCP -- 1 week, hospital f/u  Infectious Diseases -- 9/3/2020 @ 10:40am     Standing Status:   Standing     Number of Occurrences:   1     Order Specific Question:   Appointment in     Answer:    Other (Specify) Follow-up Information     Follow up With Specialties Details Why Contact Info    Eric Worthington MD Family Medicine In 1 week Hospital follow up. BP check, spinal osteomyelitis/MSSA bacteremia [address]  Randolph Gant, [employer] Nurse Practitioner Go on 9/3/2020 Hospital follow up. Spinal OM, MSSA bacteremia. [address] [place]            Discharge meds at bottom of this note. Plan was discussed with patient, nursing, CM. All questions answered. Patient was stable at time of discharge. Given instructions to call a physician or return if any concerns. Discharge summary and encounter summary was sent to PCP electronically via \"Comm Mgt\" link in luma-id, if possible. Diagnostic Imaging/Tests:   Xr Chest Pa Lat    Result Date: 7/20/2020  CHEST X-RAY, 2 views. HISTORY:  Upper back pain. TECHNIQUE: PA and lateral views. COMPARISON: None. FINDINGS: Patient is rotated rightward slightly. -Lungs: are clear. -Costophrenic angles: Minimally blunted. There is a small amount of fluid in the minor fissure. -Heart size: is normal. Questionable widening of the upper mediastinum. -Pulmonary vasculature: is unremarkable. -Included portion of the upper abdomen: is unremarkable. -Bones: No gross bony lesions. -Other: None. IMPRESSION: Questionable widening upper mediastinum although this may be due to rotation. CT should be considered if clinically indicated. Mri Thorac Spine W Wo Cont    Result Date: 7/25/2020  MRI thoracic and lumbar spine without and with contrast 7/25/2020 CLINICAL HISTORY: Sepsis due to MSSA. Unknown source. Severe thoracic spine pain in mid scapular pain. TECHNIQUE: Axial and sagittal T1 and T2 weighted, and sagittal STIR images of the thoracic and lumbar spine.  Additional axial and sagittal fat-saturated T1-weighted images were obtained after the uneventful intravenous administration of 23 mL Dotarem. COMPARISON: None. FINDINGS: MRI thoracic spine: No vertebral body height abnormality is seen. Alignments are maintained without significant listhesis. Abnormal STIR and T1 signal changes are most evident in the T5, T6, and T7 vertebral bodies. The appearance is concerning for osteomyelitis. This is furthermore suggested by abnormal signal changes in the paravertebral soft tissues particularly anterior and lateral to the thoracic vertebral bodies at these levels. It should be noted that assessment at these levels particularly in the axial plane is severely limited by patient motion. However, there is suggested enhancing soft tissue with areas of internal low signal. The possibility of paravertebral abscess is not excluded. These paravertebral soft tissue changes extend down to the T9 level. In addition, abnormal enhancement is felt to occur within the spinal canal about the thoracic cord from the T4 level through the T9 level. Currently, this is favored to represent phlegmonous changes extending into the spinal canal given and no clearly demonstrated enhancing abscess is seen. It should be noted that a subtle abscess could be missed given the significant motion artifact seen at these levels on axial images. No clear abnormal cord signal changes are seen at this time. Small to moderate bilateral dependent pleural effusions are seen. MRI lumbar spine: Assessment of the lumbar spine is also mildly limited by patient motion although the degree of motion artifact is significantly less than that seen in the thoracic spine. Included portion of the retroperitoneum is remarkable for a 4.9 cm x 3.6 cm right suprarenal, likely adrenal mass. This was described as a benign finding on a prior CT scan dated 7/20/2020. A small benign right renal cortical cyst is seen on axial T2-weighted image 17 measuring 1.5 cm in size. The lowest defined disc will be labeled as L5-S1 for the purposes of this report.   Using this labeling system, the conus terminates at T12-L1 disc level without tethering. The conus is normal in signal and morphology. Spinal alignment is maintained. No acute appearing STIR signal changes are seen of lumbar vertebrae or adjacent soft tissues. Heterogeneous marrow signal is seen without abnormal STIR signal changes felt to represent age-related marrow signal changes. L1-L2: Moderate disc degeneration. Annular disc bulge, and moderate redundancy of the ligamentum flavum. These reduce the effective AP spinal canal diameter to 9 mm consistent with mild central canal stenosis. Mild right neural foraminal stenosis is also seen. L2-L3: Mild disc degeneration. Annular disc bulge, severe redundancy of the ligamentum flavum, and moderate bilateral facet hypertrophic degenerative changes. These reduce the effective AP spinal canal diameter to 7 mm consistent with moderate to severe central canal stenosis. Some maintained CSF space is seen about mildly crowded nerve roots at this level. Mild bilateral neural foraminal stenosis is seen. L3-L4: Annular disc bulge, moderate in intensity of the ligamentum flavum, and moderate bilateral facet hypertrophic degenerative changes. These reduce the effective AP spinal canal diameter to 8 mm consistent with moderate central canal stenosis. Mild bilateral neural foraminal stenosis is seen, right greater than left. A cystic structure extends posteriorly from the right L3-4 facet. This is best demonstrated on sagittal T2-weighted image 8. This demonstrates mild peripheral enhancement. The appearance and location is consistent with a synovial cyst. L4-L5: Annular disc bulge, moderate redundancy of the ligamentum flavum, and moderate to severe bilateral facet hypertrophic degenerative changes. These reduce the effective AP spinal canal diameter to 6 mm consistent with critical central canal stenosis.  There is crowding of nerve roots at this level with near complete effacement of CSF space. Mild left, and moderate right neural foraminal stenosis is seen. L5-S1:  Large posterior disc herniation severely stenosing the spinal canal at this level with residual AP spinal canal diameter measuring between 2 and 3 mm in size. Moderate right neural foraminal stenosis is seen. Moderate bilateral facet hypertrophic degenerative changes are seen. No abnormal enhancement is seen following administration of contrast.     IMPRESSION: 1. Abnormal marrow signal changes involving multiple midthoracic vertebral bodies concerning for osteomyelitis. Adjacent paravertebral soft tissue signal changes are seen concerning for paravertebral abscess although characterization is somewhat limited by significant patient motion artifact. Abnormal enhancement extends into the thoracic canal although no clear defined epidural abscess is seen. Once again, assessment is limited due to significant patient motion artifact which could obscure a subtle epidural abscess. 2. No acute osseous abnormality of the lumbar spine. Only multilevel discogenic degenerative changes and relatively advanced degenerative changes are seen as described above. These produce central canal stenoses and neural foraminal stenoses at multiple levels. Mri Lumb Spine W Wo Cont    Result Date: 7/25/2020  MRI thoracic and lumbar spine without and with contrast 7/25/2020 CLINICAL HISTORY: Sepsis due to MSSA. Unknown source. Severe thoracic spine pain in mid scapular pain. TECHNIQUE: Axial and sagittal T1 and T2 weighted, and sagittal STIR images of the thoracic and lumbar spine. Additional axial and sagittal fat-saturated T1-weighted images were obtained after the uneventful intravenous administration of 23 mL Dotarem. COMPARISON: None. FINDINGS: MRI thoracic spine: No vertebral body height abnormality is seen. Alignments are maintained without significant listhesis.  Abnormal STIR and T1 signal changes are most evident in the T5, T6, and T7 vertebral bodies. The appearance is concerning for osteomyelitis. This is furthermore suggested by abnormal signal changes in the paravertebral soft tissues particularly anterior and lateral to the thoracic vertebral bodies at these levels. It should be noted that assessment at these levels particularly in the axial plane is severely limited by patient motion. However, there is suggested enhancing soft tissue with areas of internal low signal. The possibility of paravertebral abscess is not excluded. These paravertebral soft tissue changes extend down to the T9 level. In addition, abnormal enhancement is felt to occur within the spinal canal about the thoracic cord from the T4 level through the T9 level. Currently, this is favored to represent phlegmonous changes extending into the spinal canal given and no clearly demonstrated enhancing abscess is seen. It should be noted that a subtle abscess could be missed given the significant motion artifact seen at these levels on axial images. No clear abnormal cord signal changes are seen at this time. Small to moderate bilateral dependent pleural effusions are seen. MRI lumbar spine: Assessment of the lumbar spine is also mildly limited by patient motion although the degree of motion artifact is significantly less than that seen in the thoracic spine. Included portion of the retroperitoneum is remarkable for a 4.9 cm x 3.6 cm right suprarenal, likely adrenal mass. This was described as a benign finding on a prior CT scan dated 7/20/2020. A small benign right renal cortical cyst is seen on axial T2-weighted image 17 measuring 1.5 cm in size. The lowest defined disc will be labeled as L5-S1 for the purposes of this report. Using this labeling system, the conus terminates at T12-L1 disc level without tethering. The conus is normal in signal and morphology. Spinal alignment is maintained. No acute appearing STIR signal changes are seen of lumbar vertebrae or adjacent soft tissues. Heterogeneous marrow signal is seen without abnormal STIR signal changes felt to represent age-related marrow signal changes. L1-L2: Moderate disc degeneration. Annular disc bulge, and moderate redundancy of the ligamentum flavum. These reduce the effective AP spinal canal diameter to 9 mm consistent with mild central canal stenosis. Mild right neural foraminal stenosis is also seen. L2-L3: Mild disc degeneration. Annular disc bulge, severe redundancy of the ligamentum flavum, and moderate bilateral facet hypertrophic degenerative changes. These reduce the effective AP spinal canal diameter to 7 mm consistent with moderate to severe central canal stenosis. Some maintained CSF space is seen about mildly crowded nerve roots at this level. Mild bilateral neural foraminal stenosis is seen. L3-L4: Annular disc bulge, moderate in intensity of the ligamentum flavum, and moderate bilateral facet hypertrophic degenerative changes. These reduce the effective AP spinal canal diameter to 8 mm consistent with moderate central canal stenosis. Mild bilateral neural foraminal stenosis is seen, right greater than left. A cystic structure extends posteriorly from the right L3-4 facet. This is best demonstrated on sagittal T2-weighted image 8. This demonstrates mild peripheral enhancement. The appearance and location is consistent with a synovial cyst. L4-L5: Annular disc bulge, moderate redundancy of the ligamentum flavum, and moderate to severe bilateral facet hypertrophic degenerative changes. These reduce the effective AP spinal canal diameter to 6 mm consistent with critical central canal stenosis. There is crowding of nerve roots at this level with near complete effacement of CSF space. Mild left, and moderate right neural foraminal stenosis is seen. L5-S1:  Large posterior disc herniation severely stenosing the spinal canal at this level with residual AP spinal canal diameter measuring between 2 and 3 mm in size.  Moderate right neural foraminal stenosis is seen. Moderate bilateral facet hypertrophic degenerative changes are seen. No abnormal enhancement is seen following administration of contrast.     IMPRESSION: 1. Abnormal marrow signal changes involving multiple midthoracic vertebral bodies concerning for osteomyelitis. Adjacent paravertebral soft tissue signal changes are seen concerning for paravertebral abscess although characterization is somewhat limited by significant patient motion artifact. Abnormal enhancement extends into the thoracic canal although no clear defined epidural abscess is seen. Once again, assessment is limited due to significant patient motion artifact which could obscure a subtle epidural abscess. 2. No acute osseous abnormality of the lumbar spine. Only multilevel discogenic degenerative changes and relatively advanced degenerative changes are seen as described above. These produce central canal stenoses and neural foraminal stenoses at multiple levels. Ct Chest Abd Pelv W Wo Cont    Result Date: 7/20/2020  CT OF THE CHEST ABDOMEN AND PELVIS INDICATION: Right upper quadrant pain and pain between shoulder blades and mass on abdominal ultrasound. TECHNIQUE:  Multiple axial images were obtained through the chest, abdomen and pelvis. Oral contrast was used for bowel opacification. 100mL of Isovue 370 intravenous contrast was used for better evaluation of solid organs and vascular structures. Radiation dose reduction techniques were used for this study. All CT scans performed at this facility use one or all of the following: Automated exposure control, adjustment of the mA and/or kVp according to patient's size, iterative reconstruction. COMPARISON: None FINDINGS: -LUNGS: Small right pleural effusion. Some bands of atelectasis right lung. -MEDIASTINUM/AXILLA: Unremarkable. No adenopathy. -HEART/VESSELS: Great vessels unremarkable opacified.  -CHEST WALL/BONES: Thoracic spine demonstrates degenerative changes. Mildly prominent soft tissue and fat attenuation just anterior to the T6 and T7 bodies. Underlying bone demonstrates significant spondylosis and osteophytes. -LIVER: Small cyst right lobe. -GALLBLADDER/BILE DUCTS: No gallstones or bile duct dilation. -PANCREAS: Unremarkable. -SPLEEN: Normal size and uniform -ADRENALS: A 5.5 cm right adrenal mass measures 10 Hounsfield units consistent with fat and benignity. -KIDNEYS/URETERS: Small cyst right kidney. Kidneys enhance symmetrically. No hydronephrosis. No radiopaque calculi. -BLADDER: Normal. -REPRODUCTIVE ORGANS: Uterus and adnexa unremarkable. -BOWEL: Normal caliber. No inflammatory changes. -LYMPH NODES: No significant retroperitoneal, mesenteric, or pelvic adenopathy. -BONES: No fracture or significant bone lesion. Degenerative changes lumbar spine with spinal stenosis at L2-3 L3-4 L4-5 and L5-S1. -VASCULATURE: Normal -OTHER: No ascites. This case was reviewed in consultation with colleagues. IMPRESSION: 1) Trace right pleural fusion. 2) Benign fat-containing 5.5 cm right adrenal adenoma. Us Abd Ltd    Result Date: 7/20/2020  RIGHT UPPER QUADRANT ULTRASOUND. HISTORY: Right upper quadrant abdominal pain and shoulder pain. Some nausea. COMPARISON: No relevant comparison studies available. FINDINGS: Ultrasonographic Whyte's sign: is reported as positive. Gallstones: No . Gallbladder Wall: not thickened. Common Bile Duct: is not dilated, 1-2 mm. Intrahepatic Biliary Tree: is not dilated. Liver: Uniform parenchyma Included portion of the pancreas and right kidney: are unremarkable. There is a 3.0 x 4.9 cm mass, nearly isoechoic to liver adjacent to the kidney. IMPRESSION: Negative for gallstones or biliary tree obstruction. Patient was tender over the gallbladder. There is a 4.9 cm mass in the right upper quadrant, may be unusual. Coarsened liver with possible adrenal mass. CT scan or MRI would better evaluate this.        Echocardiogram results:  Results for orders placed or performed during the hospital encounter of 20   2D ECHO COMPLETE ADULT (TTE) W OR [address] [facility] [address] [facility], [address] [facility]  [phone]    Transthoracic Echocardiogram  2D, M-mode, Doppler, and Color Doppler    Patient: Temo Martinez  MR #: [id number]  : 1960  Age: 64 years  Gender: Female  Study date: 2020  Account #: [de-identified]  Height: 64 in  Weight: 259.4 lb  BSA: 2.19 mï¾²  Status:Routine  Location: [facility]  BP: 115/ 68    Allergies: HYDROCODONE-ACETAMINOPHEN    Sonographer:  Jed Santiago  Group:  [id number] [facility]  Referring Physician:  Isaias Starks MD  Reading Physician:  Lazarus Bolder, MD    INDICATIONS: malignant htn  **Technically difficult due to pt body habitus and being supine    PROCEDURE: This was a routine study. A transthoracic echocardiogram was  performed. The study included complete 2D imaging, M-mode, complete spectral  Doppler, and color Doppler. Intravenous contrast (Definity) was administered. This was a technically difficult study. LEFT VENTRICLE: Size was normal. Systolic function was normal. Ejection  fraction was estimated in the range of 55 % to 60 %. There were no regional  wall motion abnormalities. There was mild concentric hypertrophy. Normal left  ventricular diastolic function E/e' 1.04    RIGHT VENTRICLE: Systolic function was normal. Not well visualized. The  tricuspid jet envelope definition was inadequate for estimation of RV   systolic  pressure. LEFT ATRIUM: Size was normal.    RIGHT ATRIUM: Size was normal.    SYSTEMIC VEINS: IVC: The inferior vena cava was normal in size and course. AORTIC VALVE: The valve was not well visualized. There was no evidence for  stenosis. There was no insufficiency. MITRAL VALVE: Valve structure was normal. There was no evidence for stenosis. There was no regurgitation.     TRICUSPID VALVE: The valve structure was normal. There was no evidence for  stenosis. There was trivial regurgitation. PULMONIC VALVE: The valve structure was normal. There was no evidence for  stenosis. There was no insufficiency. PERICARDIUM: There was no pericardial effusion. AORTA: The root exhibited normal size. SUMMARY:    -  Left ventricle: Systolic function was normal. Ejection fraction was  estimated in the range of 55 % to 60 %. There were no regional wall motion  abnormalities. There was mild concentric hypertrophy. SYSTEM MEASUREMENT TABLES    2D mode  Left Atrium Systolic Volume Index; Method of Disks, Biplane; 2D mode;: 23.6  ml/m2  IVS/LVPW (2D): 1  IVSd (2D): 1.3 cm  LVIDd (2D): 4.6 cm  LVIDs (2D): 3 cm  LVPWd (2D): 1.3 cm  RVIDd (2D): 1.9 cm    Unspecified Scan Mode  Peak Grad; Mean; Antegrade Flow: 10 mm[Hg]  Vmax;  Antegrade Flow: 162 cm/s    Prepared and signed by    Brittani Butler MD  Signed 21-Jul-2020 11:15:37         Procedures done this admission:  * No surgery found *    All Micro Results     Procedure Component Value Units Date/Time    CULTURE, BLOOD [[id number]] Collected:  07/23/20 0353    Order Status:  Completed Specimen:  Blood Updated:  07/28/20 0858     Special Requests: --        LEFT  HAND       Culture result: NO GROWTH 5 DAYS       CULTURE, BLOOD [[id number]] Collected:  07/23/20 0359    Order Status:  Completed Specimen:  Blood Updated:  07/28/20 0858     Special Requests: --        RIGHT  HAND       Culture result: NO GROWTH 5 DAYS       CULTURE, BLOOD [[id number]]  (Abnormal) Collected:  07/20/20 1828    Order Status:  Completed Specimen:  Blood Updated:  07/23/20 0737     Special Requests: --        RIGHT  HAND       GRAM STAIN       GRAM POS COCCI IN CLUSTERS                  AEROBIC AND ANAEROBIC BOTTLES                  RESULTS VERIFIED, PHONED TO AND READ BACK BY NADIA Her RN ON 7.21.2020 AT 46,  AH           Culture result: STAPHYLOCOCCUS AUREUS               For Susceptibility Refer to Culture  [id number]      CULTURE, BLOOD [[id number]]  (Abnormal)  (Susceptibility) Collected:  07/20/20 1740    Order Status:  Completed Specimen:  Blood Updated:  07/23/20 0736     Special Requests: --        LEFT  HAND       GRAM STAIN       GRAM POS COCCI IN CLUSTERS                  AEROBIC AND ANAEROBIC BOTTLES                  RESULTS VERIFIED, PHONED TO AND READ BACK BY NADIA Her RN ON 7.21.2020 AT 0957,             Culture result: STAPHYLOCOCCUS AUREUS         REFER TO Yuri Hargrove ACCESSION [id number]     BLOOD CULTURE ID PANEL [[id number]]  (Abnormal) Collected:  07/20/20 1740    Order Status:  Completed Specimen:  Blood Updated:  07/21/20 1121     Acc. no. from Micro Order [id number]     Staphylococcus Detected        Staphylococcus aureus Detected        Comment: RESULTS VERIFIED, PHONED TO AND READ BACK BY  NADIA Her RN ON 7/21/20 @0957,           mecA (Methicillin-Resistance Genes) NOT DETECTED        INTERPRETATION       Gram positive cocci in clusters, identified in realtime PCR as probable MSSA. Comment: Recommend discontinuing IV vancomycin starting cefazolin or nafcillin if patient not on beta-lactam therapy. Infectious Diseases Consult recommended in adult patients. THIS TEST DOES NOT REPLACE SENSITIVITY TESTING.              SARS-CoV-2 Lab Results  \"Novel Coronavirus\" Test: No results found for: COV2NT   \"Emergent Disease\" Test: No results found for: EDPR  \"SARS-COV-2\" Test: No results found for: XGCOVT  \"Precision Labs\" Test: No results found for: RSLT  Rapid Test: No results found for: COVR         Labs: Results:       BMP, Mg, Phos Recent Labs     07/28/20 0514 07/27/20  0511 07/26/20  0423    138 140   K 3.9 3.9 4.2    101 103   CO2 31 31 33*   AGAP 6* 6* 4*   BUN 9 14 17   CREA 0.85 0.79 0.93   CA 8.7 8.1* 8.6   GLU 97 98 92      CBC Recent Labs     07/28/20 0514 07/27/20  0511 07/26/20  0423   WBC 25.5* 22.0* 24.4*   RBC 3.79* 3.60* 4.02*   HGB 10.8* 10.6* 11.6*   HCT 33.8* 32.0* 35.8    291 308   GRANS 75 67 67   LYMPH 13 17 18   EOS 1 2 1   MONOS 6 6 6   BASOS 0 1 1   IG 5 7* 7*   ANEU 19.1* 14.9* 16.3*   ABL 3.3 3.7 4.4   JASON 0.2 0.4 0.3   ABM 1.5* 1.3 1.4*   ABB 0.1 0.2 0.2   AIG 1.3* 1.5* 1.8*      LFT No results for input(s): ALT, TBIL, AP, TP, ALB, GLOB, AGRAT in the last 72 hours.     No lab exists for component: SGOT, GPT   Cardiac Testing No results found for: BNPP, BNP, CPK, RCK1, RCK2, RCK3, RCK4, CKMB, CKNDX, CKND1, TROPT, TROIQ   Coagulation Tests No results found for: PTP, INR, APTT, INREXT   A1c Lab Results   Component Value Date/Time    Hemoglobin A1c 6.2 (H) 07/20/2020 07:47 AM      Lipid Panel No results found for: CHOL, CHOLPOCT, CHOLX, CHLST, CHOLV, 212782, HDL, HDLP, LDL, LDLC, DLDLP, 824444, VLDLC, VLDL, TGLX, TRIGL, TRIGP, TGLPOCT, CHHD, CHHDX   Thyroid Panel No results found for: TSH, T4, FT4, TT3, T3U, TSHEXT     Most Recent UA Lab Results   Component Value Date/Time    WBC 0-3 07/20/2020 02:14 PM    RBC 5-10 07/20/2020 02:14 PM    Epithelial cells 0-3 07/20/2020 02:14 PM    Bacteria 0 07/20/2020 02:14 PM    Casts 5-10 07/20/2020 02:14 PM        Allergies   Allergen Reactions    Lortab [Hydrocodone-Acetaminophen] Nausea and Vomiting     Immunization History   Administered Date(s) Administered    TD Vaccine 08/16/1993       All Labs from Last 24 Hrs:  Recent Results (from the past 24 hour(s))   METABOLIC PANEL, BASIC    Collection Time: 07/28/20  5:14 AM   Result Value Ref Range    Sodium 137 136 - 145 mmol/L    Potassium 3.9 3.5 - 5.1 mmol/L    Chloride 100 98 - 107 mmol/L    CO2 31 21 - 32 mmol/L    Anion gap 6 (L) 7 - 16 mmol/L    Glucose 97 65 - 100 mg/dL    BUN 9 6 - 23 MG/DL    Creatinine 0.85 0.6 - 1.0 MG/DL    GFR est AA >60 >60 ml/min/1.73m2    GFR est non-AA >60 >60 ml/min/1.73m2    Calcium 8.7 8.3 - 10.4 MG/DL   CBC WITH AUTOMATED DIFF    Collection Time: 07/28/20  5:14 AM   Result Value Ref Range WBC 25.5 (H) 4.3 - 11.1 K/uL    RBC 3.79 (L) 4.05 - 5.2 M/uL    HGB 10.8 (L) 11.7 - 15.4 g/dL    HCT 33.8 (L) 35.8 - 46.3 %    MCV 89.2 79.6 - 97.8 FL    MCH 28.5 26.1 - 32.9 PG    MCHC 32.0 31.4 - 35.0 g/dL    RDW 15.9 (H) 11.9 - 14.6 %    PLATELET 190 864 - 368 K/uL    MPV 12.0 9.4 - 12.3 FL    ABSOLUTE NRBC 0.00 0.0 - 0.2 K/uL    DF AUTOMATED      NEUTROPHILS 75 43 - 78 %    LYMPHOCYTES 13 13 - 44 %    MONOCYTES 6 4.0 - 12.0 %    EOSINOPHILS 1 0.5 - 7.8 %    BASOPHILS 0 0.0 - 2.0 %    IMMATURE GRANULOCYTES 5 0.0 - 5.0 %    ABS. NEUTROPHILS 19.1 (H) 1.7 - 8.2 K/UL    ABS. LYMPHOCYTES 3.3 0.5 - 4.6 K/UL    ABS. MONOCYTES 1.5 (H) 0.1 - 1.3 K/UL    ABS. EOSINOPHILS 0.2 0.0 - 0.8 K/UL    ABS. BASOPHILS 0.1 0.0 - 0.2 K/UL    ABS. IMM. GRANS. 1.3 (H) 0.0 - 0.5 K/UL       Discharge Exam:  Patient Vitals for the past 24 hrs:   Temp Pulse Resp BP SpO2   07/28/20 0914 98.3 °F (36.8 °C) 86 19 (!) 169/97 95 %   07/28/20 0427 97.9 °F (36.6 °C) 83 18 (!) 153/99 94 %   07/28/20 0145 98.2 °F (36.8 °C) 85 18 (!) 160/100 94 %   07/27/20 2117 98.3 °F (36.8 °C) 83 18 153/90 95 %   07/27/20 1710 98 °F (36.7 °C) 84 18 165/88 94 %   07/27/20 1325 98.1 °F (36.7 °C) 73 18 (!) 169/98 95 %     Oxygen Therapy  O2 Sat (%): 95 % (07/28/20 0914)  Pulse via Oximetry: 84 beats per minute (07/26/20 2237)  O2 Device: Room air (07/28/20 0914)  O2 Flow Rate (L/min): 2 l/min (07/23/20 0400)    Estimated body mass index is 44.92 kg/m² as calculated from the following:    Height as of this encounter: 5' 4\" (1.626 m). Weight as of this encounter: 118.7 kg (261 lb 11.2 oz). Intake/Output Summary (Last 24 hours) at 7/28/2020 1055  Last data filed at 7/28/2020 0515  Gross per 24 hour   Intake 490 ml   Output 1100 ml   Net -610 ml       *Note that automatically entered I/Os may not be accurate; dependent on patient compliance with collection and accurate  by assistants. General:    Well nourished. Alert. Obese. Eyes:   Normal sclerae. Extraocular movements intact. ENT:  Normocephalic, atraumatic. Moist mucous membranes  CV:   Regular rate and rhythm. No murmur, rub, or gallop. Lungs:  Clear to auscultation bilaterally. No wheezing, rhonchi, or rales. Abdomen: Soft, nontender, nondistended. Extremities: Warm and dry. No cyanosis or edema. Neurologic: CN II-XII grossly intact. No gross focal deficits. Ambulating independently, no gait or strength issues. MSK:  T-spine paravertebral tenderness to palpation. Skin:     No rashes or jaundice. Psych:  Normal mood and affect.     Current Med List in Hospital:   Current Facility-Administered Medications   Medication Dose Route Frequency    polyethylene glycol (MIRALAX) packet 17 g  17 g Oral DAILY PRN    sodium chloride (NS) flush 10 mL  10 mL InterCATHeter Q8H    sodium chloride (NS) flush 10 mL  10 mL InterCATHeter PRN    pantoprazole (PROTONIX) tablet 40 mg  40 mg Oral ACB    orphenadrine citrate (NORFLEX) sr tablet 100 mg  100 mg Oral BID    polyethylene glycol (MIRALAX) packet 17 g  17 g Oral TID    docusate sodium (COLACE) capsule 100 mg  100 mg Oral DAILY    ceFAZolin (ANCEF) 2 g/20 mL in sterile water IV syringe  2 g IntraVENous Q8H    lidocaine (XYLOCAINE) 2 % viscous solution 15 mL  15 mL Mouth/Throat PRN    traMADoL (ULTRAM) tablet 50 mg  50 mg Oral Q6H PRN    hydrALAZINE (APRESOLINE) tablet 10 mg  10 mg Oral Q6H PRN    potassium chloride (K-DUR, KLOR-CON) SR tablet 20 mEq  20 mEq Oral DAILY    alum-mag hydroxide-simeth (MYLANTA) oral suspension 30 mL  30 mL Oral Q4H PRN    lisinopril-hydroCHLOROthiazide (PRINZIDE, ZESTORETIC) 20-25 mg per tablet 1 Tab  1 Tab Oral DAILY    sodium chloride (NS) flush 5-40 mL  5-40 mL IntraVENous Q8H    sodium chloride (NS) flush 5-40 mL  5-40 mL IntraVENous PRN    acetaminophen (TYLENOL) tablet 650 mg  650 mg Oral Q4H PRN    oxyCODONE-acetaminophen (PERCOCET) 5-325 mg per tablet 1 Tab  1 Tab Oral Q4H PRN    morphine injection 1 mg  1 mg IntraVENous Q4H PRN    naloxone (NARCAN) injection 0.4 mg  0.4 mg IntraVENous PRN    diphenhydrAMINE (BENADRYL) injection 12.5 mg  12.5 mg IntraVENous Q4H PRN    ondansetron (ZOFRAN) injection 4 mg  4 mg IntraVENous Q4H PRN    bisacodyL (DULCOLAX) tablet 5 mg  5 mg Oral DAILY PRN    magnesium oxide (MAG-OX) tablet 400 mg  400 mg Oral BID       Discharge Info:   Current Discharge Medication List      START taking these medications    Details   ceFAZolin (ANCEF) in sterile water 2 gram/20 mL 2 g IV syringe 20 mL by IntraVENous route every eight (8) hours. Qty: 1 mL, Refills: 0      oxyCODONE-acetaminophen (PERCOCET) 5-325 mg per tablet Take 1 Tab by mouth every four (4) hours as needed for Pain for up to 3 days. Max Daily Amount: 6 Tabs. Qty: 18 Tab, Refills: 0    Associated Diagnoses: Osteomyelitis of spine (HCC)      polyethylene glycol (MIRALAX) 17 gram packet Take 1 Packet by mouth daily. Qty: 30 Packet, Refills: 0         CONTINUE these medications which have NOT CHANGED    Details   lisinopril-hydroCHLOROthiazide (PRINZIDE, ZESTORETIC) 20-25 mg per tablet Take 1 Tab by mouth daily. Qty: 30 Tab, Refills: 0         STOP taking these medications       predniSONE (DELTASONE) 20 mg tablet Comments:   Reason for Stopping:                 Time spent in patient discharge planning and coordination 35 minutes.     Signed:  Doreen Alvarado MD

## 2020-07-28 NOTE — DISCHARGE INSTRUCTIONS
Patient Education      Patient Education        Peripherally Inserted Central Catheter [facility]): Care Instructions  Your Care Instructions     A peripherally inserted central catheter (PICC) is a soft, flexible tube that runs under your skin from a vein in your arm to a large vein near your heart. One end of the catheter stays outside your body. It is a type of central venous catheter, or central venous line. You may have it for weeks or months. A PICC is used to give you medicine, blood products, nutrients, or fluids. A PICC makes doing these things more comfortable for you because they are put directly into the catheter. So you will not be stuck with a needle every time. A PICC may be used to draw blood for tests only if another vein, such as in the hand or arm, can't be used. The end of the PICC sometimes has two or three openings so that you can get more than one type of fluid or medicine at a time. Your doctor may give you medicine to make you feel relaxed. You may feel a little pain when your doctor numbs your arm. Your doctor will then thread the catheter up a vein in your arm to a larger vein. You will not feel any pain. The doctor may use stitches or other devices to hold the catheter in place where it exits your arm. After the procedure, the site may be sore for a day or two. Follow-up care is a key part of your treatment and safety. Be sure to make and go to all appointments, and call your doctor if you are having problems. It's also a good idea to know your test results and keep a list of the medicines you take. How can you care for yourself at home? · Do not wear jewelry, such as necklaces, that can catch on the catheter. · If the catheter breaks, follow the instructions your doctor gave you. If you have no instructions, clamp or tie off the catheter. Then see a doctor as soon as possible. · To help prevent infection, take a shower instead of a bath. Do not go swimming with the catheter.   · Try to keep the area dry. When you shower, cover the area with waterproof material, such as plastic wrap. · Never touch the open end of the catheter if the cap is off. · Never use scissors, knives, pins, or other sharp objects near the catheter or other tubing. · If your catheter has a clamp, keep it clamped when you are not using it. · Fasten or tape the catheter to your body to prevent pulling or dangling. · Avoid clothing that rubs or pulls on your catheter. · Avoid bending or crimping your catheter. · Always wash your hands before you touch your catheter. · Wear loose clothing over the catheter for the first 10 to 14 days. When getting dressed, be careful not to pull on the catheter. How to change the dressing  Since the PICC is in one of your arms, you will not be able to change the dressing on your own. You will need someone to help you change the dressing using the same instructions that your doctor or nurse gave you. Your PICC dressing should be changed at least once a week. If the dressing becomes loose, wet, or dirty, it must be changed more often to prevent infection. Your doctor may also give you instructions for when to change the dressing. Be sure you have all your supplies ready. These include medical tape, a surgical mask, sterile gloves, and your dressing kit. The names and brands of the items will vary. Your doctor or nurse may give you specific instructions for changing the dressing. Here are basic tips for how to change the dressing. You will need help changing it. 1. Wash your hands with soap and water for 15 seconds. Dry your hands with paper towels. 2. Put on the surgical mask. 3. Loosen and remove your old dressing. Peel the dressing toward the PICC, not away from it. You may need to use an adhesive remover if your dressing does not come off easily. 4. Look at the site carefully for redness, swelling, drainage, tenderness, or warmth.  If you notice any of these, call your doctor. 5. Wash your hands again, and open your dressing kit. Put on the sterile gloves. 6. Clean the site with the supplies in the dressing kit. 7. Use the dressing that your doctor gave you, and place it over the site. 8. Tape the PICC tubing to your skin so that it does not dangle or pull. When should you call for help? [id number] anytime you think you may need emergency care. For example, call if:  · You passed out (lost consciousness). · You have severe trouble breathing. · You have sudden chest pain and shortness of breath, or you cough up blood. · You have a fast or uneven pulse. Call your doctor now or seek immediate medical care if:  · You have signs of infection, such as:  ? Increased pain, swelling, warmth, or redness. ? Red streaks leading from the area. ? Pus or blood draining from the area. ? A fever. · You have swelling in your face, chest, neck, or arm on the side where the catheter is. · You have signs of a blood clot, such as bulging veins near the catheter. · Your catheter is leaking, cracked, or clogged. · You feel resistance when you inject medicine or fluids into your catheter. · Your catheter is out of place. This may happen after severe coughing or vomiting, or if you pull on the catheter. · You have chest pain or shortness of breath. Watch closely for changes in your health, and be sure to contact your doctor if:  · You have any concerns about your catheter. Where can you learn more? Go to http://deborah-kasia.info/  Enter L935 in the search box to learn more about \"Peripherally Inserted Central Catheter (PICC): Care Instructions. \"  Current as of: June 26, 2019               Content Version: 12.5  © 0986-7800 [employer]. Care instructions adapted under license by [employer] (which disclaims liability or warranty for this information).  If you have questions about a medical condition or this instruction, always ask your healthcare professional. [address] any warranty or liability for your use of this information. Back Pain, Emergency or Urgent Symptoms: Care Instructions  Your Care Instructions     Many people have back pain at one time or another. In most cases, pain gets better with self-care that includes over-the-counter pain medicine, ice, heat, and exercises. Unless you have symptoms of a severe injury or heart attack, you may be able to give yourself a few days before you call a doctor. But some back problems are very serious. Do not ignore symptoms that need to be checked right away. Follow-up care is a key part of your treatment and safety. Be sure to make and go to all appointments, and call your doctor if you are having problems. It's also a good idea to know your test results and keep a list of the medicines you take. How can you care for yourself at home? · Sit or lie in positions that are most comfortable and that reduce your pain. Try one of these positions when you lie down:  ? Lie on your back with your knees bent and supported by large pillows. ? Lie on the floor with your legs on the seat of a sofa or chair. ? Lie on your side with your knees and hips bent and a pillow between your legs. ? Lie on your stomach if it does not make pain worse. · Do not sit up in bed, and avoid soft couches and twisted positions. Bed rest can help relieve pain at first, but it delays healing. Avoid bed rest after the first day. · Change positions every 30 minutes. If you must sit for long periods of time, take breaks from sitting. Get up and walk around, or lie flat. · Try using a heating pad on a low or medium setting, for 15 to 20 minutes every 2 or 3 hours. Try a warm shower in place of one session with the heating pad. You can also buy single-use heat wraps that last up to 8 hours. You can also try ice or cold packs on your back for 10 to 20 minutes at a time, several times a day.  (Put a thin cloth between the ice pack and your skin.) This reduces pain and makes it easier to be active and exercise. · Take pain medicines exactly as directed. ? If the doctor gave you a prescription medicine for pain, take it as prescribed. ? If you are not taking a prescription pain medicine, ask your doctor if you can take an over-the-counter medicine. When should you call for help? [id number] anytime you think you may need emergency care. For example, call if:  · You are unable to move a leg at all. · You have back pain with severe belly pain. · You have symptoms of a heart attack. These may include:  ? Chest pain or pressure, or a strange feeling in the chest.  ? Sweating. ? Shortness of breath. ? Nausea or vomiting. ? Pain, pressure, or a strange feeling in the back, neck, jaw, or upper belly or in one or both shoulders or arms. ? Lightheadedness or sudden weakness. ? A fast or irregular heartbeat. After you call 911, the  may tell you to chew 1 adult-strength or 2 to 4 low-dose aspirin. Wait for an ambulance. Do not try to drive yourself. Call your doctor now or seek immediate medical care if:  · You have new or worse symptoms in your arms, legs, chest, belly, or buttocks. Symptoms may include:  ? Numbness or tingling. ? Weakness. ? Pain. · You lose bladder or bowel control. · You have back pain and:  ? You have injured your back while lifting or doing some other activity. Call if the pain is severe, has not gone away after 1 or 2 days, and you cannot do your normal daily activities. ? You have had a back injury before that needed treatment. ? Your pain has lasted longer than 4 weeks. ? You have had weight loss you cannot explain. ? You have a fever. ? You are age 48 or older. ? You have cancer now or have had it before. Watch closely for changes in your health, and be sure to contact your doctor if you are not getting better as expected. Where can you learn more?   Go to http://deborah-kasia.info/  Enter V172 in the search box to learn more about \"Back Pain, Emergency or Urgent Symptoms: Care Instructions. \"  Current as of: June 26, 2019               Content Version: 12.5  © 0471-4337 [employer]. Care instructions adapted under license by [employer] (which disclaims liability or warranty for this information). If you have questions about a medical condition or this instruction, always ask your healthcare professional. [address] any warranty or liability for your use of this information. Patient Education        Constipation: Care Instructions  Your Care Instructions     Constipation means that you have a hard time passing stools (bowel movements). People pass stools from 3 times a day to once every 3 days. What is normal for you may be different. Constipation may occur with pain in the rectum and cramping. The pain may get worse when you try to pass stools. Sometimes there are small amounts of bright red blood on toilet paper or the surface of stools. This is because of enlarged veins near the rectum (hemorrhoids). A few changes in your diet and lifestyle may help you avoid ongoing constipation. Your doctor may also prescribe medicine to help loosen your stool. Some medicines can cause constipation. These include pain medicines and antidepressants. Tell your doctor about all the medicines you take. Your doctor may want to make a medicine change to ease your symptoms. Follow-up care is a key part of your treatment and safety. Be sure to make and go to all appointments, and call your doctor if you are having problems. It's also a good idea to know your test results and keep a list of the medicines you take. How can you care for yourself at home? · Drink plenty of fluids, enough so that your urine is light yellow or clear like water.  If you have kidney, heart, or liver disease and have to limit fluids, talk with your doctor before you increase the amount of fluids you drink. · Include high-fiber foods in your diet each day. These include fruits, vegetables, beans, and whole grains. · Get at least 30 minutes of exercise on most days of the week. Walking is a good choice. You also may want to do other activities, such as running, swimming, cycling, or playing tennis or team sports. · Take a fiber supplement, such as Citrucel or Metamucil, every day. Read and follow all instructions on the label. · Schedule time each day for a bowel movement. A daily routine may help. Take your time having your bowel movement. · Support your feet with a small step stool when you sit on the toilet. This helps flex your hips and places your pelvis in a squatting position. · Your doctor may recommend an over-the-counter laxative to relieve your constipation. Examples are Milk of Magnesia and MiraLax. Read and follow all instructions on the label. Do not use laxatives on a long-term basis. When should you call for help? Call your doctor now or seek immediate medical care if:  · You have new or worse belly pain. · You have new or worse nausea or vomiting. · You have blood in your stools. Watch closely for changes in your health, and be sure to contact your doctor if:  · Your constipation is getting worse. · You do not get better as expected. Where can you learn more? Go to http://deborah-kasia.info/  Enter P343 in the search box to learn more about \"Constipation: Care Instructions. \"  Current as of: June 26, 2019               Content Version: 12.5  © 9315-5919 [employer]. Care instructions adapted under license by [employer] (which disclaims liability or warranty for this information).  If you have questions about a medical condition or this instruction, always ask your healthcare professional. [place] [address] any warranty or liability for your use of this information. Patient Education        The Spine: Anatomy Sketch    Current as of: March 2, 2020               Content Version: 12.5  © 4848-5258 [employer]. Care instructions adapted under license by [employer] (which disclaims liability or warranty for this information). If you have questions about a medical condition or this instruction, always ask your healthcare professional. [place] [address] any warranty or liability for your use of this information.

## 2020-07-28 NOTE — PROGRESS NOTES
Bedside and Verbal shift change report given to self (oncoming nurse) by PARDEEP Becerra (offgoing nurse). Report included the following information SBAR, Kardex, Intake/Output, MAR and Recent Results.

## 2020-07-28 NOTE — PROGRESS NOTES
Care Management Interventions  PCP Verified by CM: Yes(Seen at [facility] 3 months ago)  Mode of Transport at Discharge: Other (see comment)(Rajan Nicholson  Other Relative  [phone]  )  Transition of Care Consult (CM Consult): Discharge Planning, [address]: Yes  Discharge Durable Medical Equipment: No  Physical Therapy Consult: No  Occupational Therapy Consult: No  Current Support Network: Other(Her 2 sons live with her in a house)  Confirm Follow Up Transport: Family  The Plan for Transition of Care is Related to the Following Treatment Goals : Home health  The Patient and/or Patient Representative was Provided with a Choice of Provider and Agrees with the Discharge Plan?: Yes  Name of the Patient Representative Who was Provided with a Choice of Provider and Agrees with the Discharge Plan: Patient  Freedom of Choice List was Provided with Basic Dialogue that Supports the Patient's Individualized Plan of Care/Goals, Treatment Preferences and Shares the Quality Data Associated with the Providers?: Yes  Discharge Location  Discharge Placement: Home with home health    CM met with pt. To discuss discharge. Pt agreeable to being taught by Roque for IV ABX for 8 weeks. [facility] to provide maintenance for PICC line and lab work. No further CM needs.

## 2020-07-28 NOTE — PROGRESS NOTES
Infectious Disease Progress Note    Today's Date: 2020   Admit Date: 2020    Impression:   · MSSA bacteremia (), TTE/DREA negative, source not clear, but does have a habit of picking her skin. Repeat [facility]  NG  · T4-T9 OM/disciitis with paravertebral abscess concerns   · Nausea/vomiting, intermittent  · Leukocytosis  · R shoulder decreased active ROM, normal passive ROM. Issue developed after RUE PICC attempt     Plan:     · Continue Cefazolin 2g IV q 8h. Duration 8 weeks, EOT 9/3/20. She is self-pay so CM working on home infusion aide. OPAT orders written. · Dispo home today. ID appt 9/3 at 1040AM. May be able to end at 6 weeks or transition to oral abx pending labs and clinical status     Anti-infectives:   · Cefazolin -  · vanc -  · CTX -    Subjective:   Sitting up in chair, ready to go home. Appt info given. Allergies   Allergen Reactions    Lortab [Hydrocodone-Acetaminophen] Nausea and Vomiting        Review of Systems:  A comprehensive review of systems was negative except for that written in the History of Present Illness. Objective:     Visit Vitals  BP (!) 169/97   Pulse 86   Temp 98.3 °F (36.8 °C)   Resp 19   Ht 5' 4\" (1.626 m)   Wt 118.7 kg (261 lb 11.2 oz)   SpO2 95%   BMI 44.92 kg/m²     Temp (24hrs), Av.1 °F (36.7 °C), Min:97.9 °F (36.6 °C), Max:98.3 °F (36.8 °C)   No changes from my previous exam     General:  Alert, cooperative, no acute distress, appears stated age, morbidly obese   Head:  Normocephalic, atraumatic    Eyes:  Anicteric, no drainage, not injected, EOMI   Throat: Mucus membranes moist OP clear   Lungs:   Clear throughout lung fields without increased work of breathing or audible wheezes   Heart:  Regular rate and rhythm, without audible murmur, rub, or gallop   Abdomen:   Soft, non-tender, no guarding, no distention, bowel sounds active   Extremities: Extremities normal, atraumatic, no cyanosis or edema.  No obvious outward changes between shoulder blades   Pulses: 2+ and symmetric   Skin: Skin color, texture, turgor normal, no rashes or lesions. Lines/Devices: PIV         Data Review:     CBC:  Recent Labs     07/28/20 0514 07/27/20  0511 07/26/20 0423   WBC 25.5* 22.0* 24.4*   GRANS 75 67 67   MONOS 6 6 6   EOS 1 2 1   ANEU 19.1* 14.9* 16.3*   ABL 3.3 3.7 4.4   HGB 10.8* 10.6* 11.6*   HCT 33.8* 32.0* 35.8    291 308       BMP:  Recent Labs     07/28/20 0514 07/27/20 0511 07/26/20 0423   CREA 0.85 0.79 0.93   BUN 9 14 17    138 140   K 3.9 3.9 4.2    101 103   CO2 31 31 33*   AGAP 6* 6* 4*   GLU 97 98 92       LFTS:  No results for input(s): TBILI, ALT, AP, TP, ALB in the last 72 hours.     No lab exists for component: SGOT    Microbiology:     All Micro Results     Procedure Component Value Units Date/Time    CULTURE, BLOOD [[id number]] Collected:  07/23/20 0353    Order Status:  Completed Specimen:  Blood Updated:  07/28/20 0858     Special Requests: --        LEFT  HAND       Culture result: NO GROWTH 5 DAYS       CULTURE, BLOOD [[id number]] Collected:  07/23/20 0359    Order Status:  Completed Specimen:  Blood Updated:  07/28/20 0858     Special Requests: --        RIGHT  HAND       Culture result: NO GROWTH 5 DAYS       CULTURE, BLOOD [[id number]]  (Abnormal) Collected:  07/20/20 1828    Order Status:  Completed Specimen:  Blood Updated:  07/23/20 0737     Special Requests: --        RIGHT  HAND       GRAM STAIN       GRAM POS COCCI IN CLUSTERS                  AEROBIC AND ANAEROBIC BOTTLES                  RESULTS VERIFIED, PHONED TO AND READ BACK BY NADIA [address] [place] [address] ON 7.21.2020 AT 0957,  [facility]           Culture result: STAPHYLOCOCCUS AUREUS               For Susceptibility Refer to Culture  [id number]      CULTURE, BLOOD [[id number]]  (Abnormal)  (Susceptibility) Collected:  07/20/20 1740    Order Status:  Completed Specimen:  Blood Updated:  07/23/20 0736     Special Requests: --        LEFT  HAND       Casey Darby STAIN       GRAM POS COCCI IN CLUSTERS                  AEROBIC AND ANAEROBIC BOTTLES                  RESULTS VERIFIED, PHONED TO AND READ BACK BY NADIA Her RN ON 7.21.2020 AT 0957,             Culture result: STAPHYLOCOCCUS AUREUS         REFER TO [address]aurabh Gonsalez Dr ACCESSION [id number]     BLOOD CULTURE ID PANEL [[id number]]  (Abnormal) Collected:  07/20/20 1740    Order Status:  Completed Specimen:  Blood Updated:  07/21/20 1121     Acc. no. from Micro Order [id number]     Staphylococcus Detected        Staphylococcus aureus Detected        Comment: RESULTS VERIFIED, PHONED TO AND READ BACK BY  NADIA Her RN ON 7/21/20 @0957,           mecA (Methicillin-Resistance Genes) NOT DETECTED        INTERPRETATION       Gram positive cocci in clusters, identified in realtime PCR as probable MSSA. Comment: Recommend discontinuing IV vancomycin starting cefazolin or nafcillin if patient not on beta-lactam therapy. Infectious Diseases Consult recommended in adult patients. THIS TEST DOES NOT REPLACE SENSITIVITY TESTING.              Imaging:   Reviewed    Signed By: Tricia Prado NP     July 28, 2020

## 2020-07-28 NOTE — PROGRESS NOTES
Care Management Interventions  PCP Verified by CM: Yes(Seen at [facility] 3 months ago)  Mode of Transport at Discharge: Other (see comment)(Rajan Nicholson  Other Relative  [phone]  )  Transition of Care Consult (CM Consult): Discharge Planning, [address]: Yes  Discharge Durable Medical Equipment: No  Physical Therapy Consult: No  Occupational Therapy Consult: No  Current Support Network: Other(Her 2 sons live with her in a house)  Confirm Follow Up Transport: Family  The Plan for Transition of Care is Related to the Following Treatment Goals : Home health  The Patient and/or Patient Representative was Provided with a Choice of Provider and Agrees with the Discharge Plan?: Yes  Name of the Patient Representative Who was Provided with a Choice of Provider and Agrees with the Discharge Plan: Patient  Freedom of Choice List was Provided with Basic Dialogue that Supports the Patient's Individualized Plan of Care/Goals, Treatment Preferences and Shares the Quality Data Associated with the Providers?: Yes  Discharge Location  Discharge Placement: Home with home health    CM informed by MD of pt discharge today. CM sent order/referral to [facility] for RN to maintenance PICC line. Intramed infusion will be at [facility] at 1430 to teach pt. CM to continue to follow to discharge.

## 2020-07-29 ENCOUNTER — HOME CARE VISIT (OUTPATIENT)
Dept: SCHEDULING | Facility: HOME HEALTH | Age: 60
End: 2020-07-29
Payer: SUBSIDIZED

## 2020-07-29 VITALS
HEART RATE: 88 BPM | DIASTOLIC BLOOD PRESSURE: 80 MMHG | OXYGEN SATURATION: 96 % | SYSTOLIC BLOOD PRESSURE: 142 MMHG | RESPIRATION RATE: 16 BRPM | TEMPERATURE: 98.2 F

## 2020-07-29 PROCEDURE — G0299 HHS/HOSPICE OF RN EA 15 MIN: HCPCS

## 2020-07-29 PROCEDURE — 400013 HH SOC

## 2020-07-31 ENCOUNTER — HOME CARE VISIT (OUTPATIENT)
Dept: SCHEDULING | Facility: HOME HEALTH | Age: 60
End: 2020-07-31
Payer: SUBSIDIZED

## 2020-07-31 VITALS
RESPIRATION RATE: 20 BRPM | DIASTOLIC BLOOD PRESSURE: 76 MMHG | HEART RATE: 83 BPM | SYSTOLIC BLOOD PRESSURE: 138 MMHG | TEMPERATURE: 97.3 F | OXYGEN SATURATION: 95 %

## 2020-07-31 PROCEDURE — G0299 HHS/HOSPICE OF RN EA 15 MIN: HCPCS

## 2020-07-31 PROCEDURE — G0155 HHCP-SVS OF CSW,EA 15 MIN: HCPCS

## 2020-08-03 ENCOUNTER — HOSPITAL ENCOUNTER (OUTPATIENT)
Dept: LAB | Age: 60
Discharge: HOME | End: 2020-08-03
Payer: SUBSIDIZED

## 2020-08-03 ENCOUNTER — HOME CARE VISIT (OUTPATIENT)
Dept: SCHEDULING | Facility: HOME HEALTH | Age: 60
End: 2020-08-03
Payer: SUBSIDIZED

## 2020-08-03 VITALS
RESPIRATION RATE: 20 BRPM | OXYGEN SATURATION: 97 % | TEMPERATURE: 97.4 F | HEART RATE: 82 BPM | DIASTOLIC BLOOD PRESSURE: 70 MMHG | SYSTOLIC BLOOD PRESSURE: 128 MMHG

## 2020-08-03 LAB
ALBUMIN SERPL-MCNC: 2.3 G/DL (ref 3.5–5)
ALBUMIN/GLOB SERPL: 0.4 {RATIO} (ref 1.2–3.5)
ALP SERPL-CCNC: 103 U/L (ref 50–136)
ALT SERPL-CCNC: 11 U/L (ref 12–65)
AST SERPL-CCNC: 15 U/L (ref 15–37)
BASOPHILS # BLD: 0.1 K/UL (ref 0–0.2)
BASOPHILS NFR BLD: 1 % (ref 0–2)
BILIRUB DIRECT SERPL-MCNC: 0.1 MG/DL
BILIRUB SERPL-MCNC: 0.2 MG/DL (ref 0.2–1.1)
CREAT SERPL-MCNC: 0.74 MG/DL (ref 0.6–1)
CRP SERPL-MCNC: 6.3 MG/DL (ref 0–0.9)
DIFFERENTIAL METHOD BLD: ABNORMAL
EOSINOPHIL # BLD: 0.1 K/UL (ref 0–0.8)
EOSINOPHIL NFR BLD: 1 % (ref 0.5–7.8)
ERYTHROCYTE [DISTWIDTH] IN BLOOD BY AUTOMATED COUNT: 15.8 % (ref 11.9–14.6)
GLOBULIN SER CALC-MCNC: 5.6 G/DL (ref 2.3–3.5)
HCT VFR BLD AUTO: 33.1 % (ref 35.8–46.3)
HGB BLD-MCNC: 10.1 G/DL (ref 11.7–15.4)
IMM GRANULOCYTES # BLD AUTO: 0.1 K/UL (ref 0–0.5)
IMM GRANULOCYTES NFR BLD AUTO: 1 % (ref 0–5)
LYMPHOCYTES # BLD: 2.6 K/UL (ref 0.5–4.6)
LYMPHOCYTES NFR BLD: 22 % (ref 13–44)
MCH RBC QN AUTO: 28.5 PG (ref 26.1–32.9)
MCHC RBC AUTO-ENTMCNC: 30.5 G/DL (ref 31.4–35)
MCV RBC AUTO: 93.5 FL (ref 79.6–97.8)
MONOCYTES # BLD: 0.7 K/UL (ref 0.1–1.3)
MONOCYTES NFR BLD: 6 % (ref 4–12)
NEUTS SEG # BLD: 8 K/UL (ref 1.7–8.2)
NEUTS SEG NFR BLD: 70 % (ref 43–78)
NRBC # BLD: 0 K/UL (ref 0–0.2)
PLATELET # BLD AUTO: 369 K/UL (ref 150–450)
PMV BLD AUTO: 12.2 FL (ref 9.4–12.3)
PROT SERPL-MCNC: 7.9 G/DL (ref 6.3–8.2)
RBC # BLD AUTO: 3.54 M/UL (ref 4.05–5.2)
WBC # BLD AUTO: 11.6 K/UL (ref 4.3–11.1)

## 2020-08-03 PROCEDURE — 85025 COMPLETE CBC W/AUTO DIFF WBC: CPT

## 2020-08-03 PROCEDURE — G0299 HHS/HOSPICE OF RN EA 15 MIN: HCPCS

## 2020-08-03 PROCEDURE — 82565 ASSAY OF CREATININE: CPT

## 2020-08-03 PROCEDURE — 86140 C-REACTIVE PROTEIN: CPT

## 2020-08-03 PROCEDURE — 80076 HEPATIC FUNCTION PANEL: CPT

## 2020-08-04 VITALS
OXYGEN SATURATION: 98 % | HEART RATE: 88 BPM | RESPIRATION RATE: 18 BRPM | DIASTOLIC BLOOD PRESSURE: 67 MMHG | SYSTOLIC BLOOD PRESSURE: 128 MMHG | TEMPERATURE: 97.8 F

## 2020-08-06 ENCOUNTER — HOME CARE VISIT (OUTPATIENT)
Dept: SCHEDULING | Facility: HOME HEALTH | Age: 60
End: 2020-08-06
Payer: SUBSIDIZED

## 2020-08-06 PROCEDURE — G0299 HHS/HOSPICE OF RN EA 15 MIN: HCPCS

## 2020-08-07 VITALS
DIASTOLIC BLOOD PRESSURE: 100 MMHG | OXYGEN SATURATION: 97 % | TEMPERATURE: 97.2 F | RESPIRATION RATE: 18 BRPM | HEART RATE: 94 BPM | SYSTOLIC BLOOD PRESSURE: 140 MMHG

## 2020-08-10 ENCOUNTER — HOME CARE VISIT (OUTPATIENT)
Dept: SCHEDULING | Facility: HOME HEALTH | Age: 60
End: 2020-08-10
Payer: SUBSIDIZED

## 2020-08-10 ENCOUNTER — HOSPITAL ENCOUNTER (OUTPATIENT)
Dept: LAB | Age: 60
Discharge: HOME | End: 2020-08-10
Payer: SUBSIDIZED

## 2020-08-10 VITALS
RESPIRATION RATE: 20 BRPM | HEART RATE: 93 BPM | DIASTOLIC BLOOD PRESSURE: 78 MMHG | OXYGEN SATURATION: 97 % | TEMPERATURE: 97.4 F | SYSTOLIC BLOOD PRESSURE: 128 MMHG

## 2020-08-10 LAB
ALBUMIN SERPL-MCNC: 2.7 G/DL (ref 3.5–5)
ALBUMIN/GLOB SERPL: 0.5 {RATIO} (ref 1.2–3.5)
ALP SERPL-CCNC: 113 U/L (ref 50–136)
ALT SERPL-CCNC: 12 U/L (ref 12–65)
AST SERPL-CCNC: 12 U/L (ref 15–37)
BASOPHILS # BLD: 0.1 K/UL (ref 0–0.2)
BASOPHILS NFR BLD: 1 % (ref 0–2)
BILIRUB DIRECT SERPL-MCNC: <0.1 MG/DL
BILIRUB SERPL-MCNC: 0.2 MG/DL (ref 0.2–1.1)
CREAT SERPL-MCNC: 0.75 MG/DL (ref 0.6–1)
CRP SERPL-MCNC: 2.9 MG/DL (ref 0–0.9)
DIFFERENTIAL METHOD BLD: ABNORMAL
EOSINOPHIL # BLD: 0.2 K/UL (ref 0–0.8)
EOSINOPHIL NFR BLD: 3 % (ref 0.5–7.8)
ERYTHROCYTE [DISTWIDTH] IN BLOOD BY AUTOMATED COUNT: 15.6 % (ref 11.9–14.6)
GLOBULIN SER CALC-MCNC: 5.9 G/DL (ref 2.3–3.5)
HCT VFR BLD AUTO: 36.5 % (ref 35.8–46.3)
HGB BLD-MCNC: 11.5 G/DL (ref 11.7–15.4)
IMM GRANULOCYTES # BLD AUTO: 0.1 K/UL (ref 0–0.5)
IMM GRANULOCYTES NFR BLD AUTO: 1 % (ref 0–5)
LYMPHOCYTES # BLD: 1.9 K/UL (ref 0.5–4.6)
LYMPHOCYTES NFR BLD: 24 % (ref 13–44)
MCH RBC QN AUTO: 29.6 PG (ref 26.1–32.9)
MCHC RBC AUTO-ENTMCNC: 31.5 G/DL (ref 31.4–35)
MCV RBC AUTO: 94.1 FL (ref 79.6–97.8)
MONOCYTES # BLD: 0.6 K/UL (ref 0.1–1.3)
MONOCYTES NFR BLD: 7 % (ref 4–12)
NEUTS SEG # BLD: 5.4 K/UL (ref 1.7–8.2)
NEUTS SEG NFR BLD: 65 % (ref 43–78)
NRBC # BLD: 0 K/UL (ref 0–0.2)
PLATELET # BLD AUTO: 333 K/UL (ref 150–450)
PMV BLD AUTO: 11.5 FL (ref 9.4–12.3)
PROT SERPL-MCNC: 8.6 G/DL (ref 6.3–8.2)
RBC # BLD AUTO: 3.88 M/UL (ref 4.05–5.2)
WBC # BLD AUTO: 8.2 K/UL (ref 4.3–11.1)

## 2020-08-10 PROCEDURE — G0299 HHS/HOSPICE OF RN EA 15 MIN: HCPCS

## 2020-08-10 PROCEDURE — 85025 COMPLETE CBC W/AUTO DIFF WBC: CPT

## 2020-08-10 PROCEDURE — 82565 ASSAY OF CREATININE: CPT

## 2020-08-10 PROCEDURE — 86140 C-REACTIVE PROTEIN: CPT

## 2020-08-10 PROCEDURE — 80076 HEPATIC FUNCTION PANEL: CPT

## 2020-08-11 ENCOUNTER — HOME CARE VISIT (OUTPATIENT)
Dept: SCHEDULING | Facility: HOME HEALTH | Age: 60
End: 2020-08-11
Payer: SUBSIDIZED

## 2020-08-11 PROCEDURE — G0155 HHCP-SVS OF CSW,EA 15 MIN: HCPCS

## 2020-08-13 ENCOUNTER — HOME CARE VISIT (OUTPATIENT)
Dept: SCHEDULING | Facility: HOME HEALTH | Age: 60
End: 2020-08-13
Payer: SUBSIDIZED

## 2020-08-13 VITALS
DIASTOLIC BLOOD PRESSURE: 95 MMHG | RESPIRATION RATE: 16 BRPM | HEART RATE: 92 BPM | OXYGEN SATURATION: 97 % | TEMPERATURE: 98.6 F | SYSTOLIC BLOOD PRESSURE: 130 MMHG

## 2020-08-13 PROCEDURE — G0299 HHS/HOSPICE OF RN EA 15 MIN: HCPCS

## 2020-08-17 ENCOUNTER — HOME CARE VISIT (OUTPATIENT)
Dept: SCHEDULING | Facility: HOME HEALTH | Age: 60
End: 2020-08-17
Payer: SUBSIDIZED

## 2020-08-17 ENCOUNTER — HOSPITAL ENCOUNTER (OUTPATIENT)
Dept: LAB | Age: 60
Discharge: HOME | End: 2020-08-17
Payer: SUBSIDIZED

## 2020-08-17 VITALS
OXYGEN SATURATION: 97 % | HEART RATE: 70 BPM | RESPIRATION RATE: 16 BRPM | TEMPERATURE: 98.7 F | SYSTOLIC BLOOD PRESSURE: 138 MMHG | DIASTOLIC BLOOD PRESSURE: 82 MMHG

## 2020-08-17 LAB
ALBUMIN SERPL-MCNC: 2.9 G/DL (ref 3.5–5)
ALBUMIN/GLOB SERPL: 0.5 {RATIO} (ref 1.2–3.5)
ALP SERPL-CCNC: 122 U/L (ref 50–136)
ALT SERPL-CCNC: 9 U/L (ref 12–65)
AST SERPL-CCNC: 12 U/L (ref 15–37)
BASOPHILS # BLD: 0.1 K/UL (ref 0–0.2)
BASOPHILS NFR BLD: 1 % (ref 0–2)
BILIRUB DIRECT SERPL-MCNC: <0.1 MG/DL
BILIRUB SERPL-MCNC: 0.2 MG/DL (ref 0.2–1.1)
CREAT SERPL-MCNC: 0.77 MG/DL (ref 0.6–1)
CRP SERPL-MCNC: 3.7 MG/DL (ref 0–0.9)
DIFFERENTIAL METHOD BLD: ABNORMAL
EOSINOPHIL # BLD: 0.3 K/UL (ref 0–0.8)
EOSINOPHIL NFR BLD: 3 % (ref 0.5–7.8)
ERYTHROCYTE [DISTWIDTH] IN BLOOD BY AUTOMATED COUNT: 15.4 % (ref 11.9–14.6)
GLOBULIN SER CALC-MCNC: 5.7 G/DL (ref 2.3–3.5)
HCT VFR BLD AUTO: 36.9 % (ref 35.8–46.3)
HGB BLD-MCNC: 11.4 G/DL (ref 11.7–15.4)
IMM GRANULOCYTES # BLD AUTO: 0.1 K/UL (ref 0–0.5)
IMM GRANULOCYTES NFR BLD AUTO: 1 % (ref 0–5)
LYMPHOCYTES # BLD: 2.5 K/UL (ref 0.5–4.6)
LYMPHOCYTES NFR BLD: 24 % (ref 13–44)
MCH RBC QN AUTO: 28.9 PG (ref 26.1–32.9)
MCHC RBC AUTO-ENTMCNC: 30.9 G/DL (ref 31.4–35)
MCV RBC AUTO: 93.4 FL (ref 79.6–97.8)
MONOCYTES # BLD: 0.6 K/UL (ref 0.1–1.3)
MONOCYTES NFR BLD: 6 % (ref 4–12)
NEUTS SEG # BLD: 6.9 K/UL (ref 1.7–8.2)
NEUTS SEG NFR BLD: 66 % (ref 43–78)
NRBC # BLD: 0 K/UL (ref 0–0.2)
PLATELET # BLD AUTO: 322 K/UL (ref 150–450)
PMV BLD AUTO: 11.3 FL (ref 9.4–12.3)
PROT SERPL-MCNC: 8.6 G/DL (ref 6.3–8.2)
RBC # BLD AUTO: 3.95 M/UL (ref 4.05–5.2)
WBC # BLD AUTO: 10.5 K/UL (ref 4.3–11.1)

## 2020-08-17 PROCEDURE — G0299 HHS/HOSPICE OF RN EA 15 MIN: HCPCS

## 2020-08-17 PROCEDURE — 82565 ASSAY OF CREATININE: CPT

## 2020-08-17 PROCEDURE — 85025 COMPLETE CBC W/AUTO DIFF WBC: CPT

## 2020-08-17 PROCEDURE — 86140 C-REACTIVE PROTEIN: CPT

## 2020-08-17 PROCEDURE — 80076 HEPATIC FUNCTION PANEL: CPT

## 2020-08-20 ENCOUNTER — HOME CARE VISIT (OUTPATIENT)
Dept: SCHEDULING | Facility: HOME HEALTH | Age: 60
End: 2020-08-20
Payer: SUBSIDIZED

## 2020-08-20 VITALS
DIASTOLIC BLOOD PRESSURE: 80 MMHG | SYSTOLIC BLOOD PRESSURE: 120 MMHG | OXYGEN SATURATION: 98 % | HEART RATE: 82 BPM | RESPIRATION RATE: 16 BRPM

## 2020-08-20 PROCEDURE — G0299 HHS/HOSPICE OF RN EA 15 MIN: HCPCS

## 2020-08-21 ENCOUNTER — HOME CARE VISIT (OUTPATIENT)
Dept: SCHEDULING | Facility: HOME HEALTH | Age: 60
End: 2020-08-21
Payer: SUBSIDIZED

## 2020-08-21 PROCEDURE — G0152 HHCP-SERV OF OT,EA 15 MIN: HCPCS

## 2020-08-23 VITALS
SYSTOLIC BLOOD PRESSURE: 136 MMHG | RESPIRATION RATE: 16 BRPM | DIASTOLIC BLOOD PRESSURE: 88 MMHG | TEMPERATURE: 98.1 F | HEART RATE: 76 BPM

## 2020-08-24 ENCOUNTER — HOSPITAL ENCOUNTER (OUTPATIENT)
Dept: LAB | Age: 60
Discharge: HOME | End: 2020-08-24
Payer: SUBSIDIZED

## 2020-08-24 ENCOUNTER — HOME CARE VISIT (OUTPATIENT)
Dept: SCHEDULING | Facility: HOME HEALTH | Age: 60
End: 2020-08-24
Payer: SUBSIDIZED

## 2020-08-24 VITALS
SYSTOLIC BLOOD PRESSURE: 118 MMHG | DIASTOLIC BLOOD PRESSURE: 72 MMHG | OXYGEN SATURATION: 97 % | TEMPERATURE: 97.8 F | HEART RATE: 78 BPM | RESPIRATION RATE: 16 BRPM

## 2020-08-24 LAB
ALBUMIN SERPL-MCNC: 2.9 G/DL (ref 3.5–5)
ALBUMIN/GLOB SERPL: 0.5 {RATIO} (ref 1.2–3.5)
ALP SERPL-CCNC: 117 U/L (ref 50–136)
ALT SERPL-CCNC: 16 U/L (ref 12–65)
AST SERPL-CCNC: 12 U/L (ref 15–37)
BASOPHILS # BLD: 0.1 K/UL (ref 0–0.2)
BASOPHILS NFR BLD: 1 % (ref 0–2)
BILIRUB DIRECT SERPL-MCNC: <0.1 MG/DL
BILIRUB SERPL-MCNC: 0.2 MG/DL (ref 0.2–1.1)
CREAT SERPL-MCNC: 0.75 MG/DL (ref 0.6–1)
CRP SERPL-MCNC: 2.5 MG/DL (ref 0–0.9)
DIFFERENTIAL METHOD BLD: ABNORMAL
EOSINOPHIL # BLD: 0.4 K/UL (ref 0–0.8)
EOSINOPHIL NFR BLD: 4 % (ref 0.5–7.8)
ERYTHROCYTE [DISTWIDTH] IN BLOOD BY AUTOMATED COUNT: 15.2 % (ref 11.9–14.6)
GLOBULIN SER CALC-MCNC: 5.3 G/DL (ref 2.3–3.5)
HCT VFR BLD AUTO: 36.8 % (ref 35.8–46.3)
HGB BLD-MCNC: 11.7 G/DL (ref 11.7–15.4)
IMM GRANULOCYTES # BLD AUTO: 0.1 K/UL (ref 0–0.5)
IMM GRANULOCYTES NFR BLD AUTO: 1 % (ref 0–5)
LYMPHOCYTES # BLD: 2.3 K/UL (ref 0.5–4.6)
LYMPHOCYTES NFR BLD: 25 % (ref 13–44)
MCH RBC QN AUTO: 29.9 PG (ref 26.1–32.9)
MCHC RBC AUTO-ENTMCNC: 31.8 G/DL (ref 31.4–35)
MCV RBC AUTO: 94.1 FL (ref 79.6–97.8)
MONOCYTES # BLD: 0.7 K/UL (ref 0.1–1.3)
MONOCYTES NFR BLD: 7 % (ref 4–12)
NEUTS SEG # BLD: 5.9 K/UL (ref 1.7–8.2)
NEUTS SEG NFR BLD: 63 % (ref 43–78)
NRBC # BLD: 0 K/UL (ref 0–0.2)
PLATELET # BLD AUTO: 300 K/UL (ref 150–450)
PMV BLD AUTO: 11.8 FL (ref 9.4–12.3)
PROT SERPL-MCNC: 8.2 G/DL (ref 6.3–8.2)
RBC # BLD AUTO: 3.91 M/UL (ref 4.05–5.2)
WBC # BLD AUTO: 9.3 K/UL (ref 4.3–11.1)

## 2020-08-24 PROCEDURE — 86140 C-REACTIVE PROTEIN: CPT

## 2020-08-24 PROCEDURE — G0299 HHS/HOSPICE OF RN EA 15 MIN: HCPCS

## 2020-08-24 PROCEDURE — 80076 HEPATIC FUNCTION PANEL: CPT

## 2020-08-24 PROCEDURE — 85025 COMPLETE CBC W/AUTO DIFF WBC: CPT

## 2020-08-24 PROCEDURE — 82565 ASSAY OF CREATININE: CPT

## 2020-08-27 ENCOUNTER — HOME CARE VISIT (OUTPATIENT)
Dept: SCHEDULING | Facility: HOME HEALTH | Age: 60
End: 2020-08-27
Payer: SUBSIDIZED

## 2020-08-27 VITALS
DIASTOLIC BLOOD PRESSURE: 94 MMHG | SYSTOLIC BLOOD PRESSURE: 131 MMHG | TEMPERATURE: 98 F | RESPIRATION RATE: 16 BRPM | HEART RATE: 67 BPM

## 2020-08-27 PROCEDURE — G0158 HHC OT ASSISTANT EA 15: HCPCS

## 2020-08-28 ENCOUNTER — HOME CARE VISIT (OUTPATIENT)
Dept: SCHEDULING | Facility: HOME HEALTH | Age: 60
End: 2020-08-28
Payer: SUBSIDIZED

## 2020-08-28 VITALS
HEART RATE: 90 BPM | TEMPERATURE: 97.7 F | RESPIRATION RATE: 16 BRPM | DIASTOLIC BLOOD PRESSURE: 90 MMHG | SYSTOLIC BLOOD PRESSURE: 145 MMHG

## 2020-08-28 PROCEDURE — 400013 HH SOC

## 2020-08-28 PROCEDURE — G0158 HHC OT ASSISTANT EA 15: HCPCS

## 2020-08-31 ENCOUNTER — HOME CARE VISIT (OUTPATIENT)
Dept: SCHEDULING | Facility: HOME HEALTH | Age: 60
End: 2020-08-31
Payer: SUBSIDIZED

## 2020-08-31 ENCOUNTER — HOSPITAL ENCOUNTER (OUTPATIENT)
Dept: LAB | Age: 60
Discharge: HOME | End: 2020-08-31
Payer: SUBSIDIZED

## 2020-08-31 VITALS
TEMPERATURE: 98.5 F | OXYGEN SATURATION: 98 % | DIASTOLIC BLOOD PRESSURE: 84 MMHG | SYSTOLIC BLOOD PRESSURE: 132 MMHG | RESPIRATION RATE: 16 BRPM | HEART RATE: 85 BPM

## 2020-08-31 LAB
ALBUMIN SERPL-MCNC: 2.8 G/DL (ref 3.5–5)
ALBUMIN/GLOB SERPL: 0.5 {RATIO} (ref 1.2–3.5)
ALP SERPL-CCNC: 126 U/L (ref 50–136)
ALT SERPL-CCNC: 11 U/L (ref 12–65)
AST SERPL-CCNC: 17 U/L (ref 15–37)
BASOPHILS # BLD: 0 K/UL (ref 0–0.2)
BASOPHILS NFR BLD: 1 % (ref 0–2)
BILIRUB DIRECT SERPL-MCNC: 0.2 MG/DL
BILIRUB SERPL-MCNC: 0.3 MG/DL (ref 0.2–1.1)
CREAT SERPL-MCNC: 0.67 MG/DL (ref 0.6–1)
CRP SERPL-MCNC: 2.5 MG/DL (ref 0–0.9)
DIFFERENTIAL METHOD BLD: ABNORMAL
EOSINOPHIL # BLD: 0.2 K/UL (ref 0–0.8)
EOSINOPHIL NFR BLD: 2 % (ref 0.5–7.8)
ERYTHROCYTE [DISTWIDTH] IN BLOOD BY AUTOMATED COUNT: 14.8 % (ref 11.9–14.6)
GLOBULIN SER CALC-MCNC: 5.3 G/DL (ref 2.3–3.5)
HCT VFR BLD AUTO: 36.6 % (ref 35.8–46.3)
HGB BLD-MCNC: 11.4 G/DL (ref 11.7–15.4)
IMM GRANULOCYTES # BLD AUTO: 0.1 K/UL (ref 0–0.5)
IMM GRANULOCYTES NFR BLD AUTO: 1 % (ref 0–5)
LYMPHOCYTES # BLD: 1.7 K/UL (ref 0.5–4.6)
LYMPHOCYTES NFR BLD: 22 % (ref 13–44)
MCH RBC QN AUTO: 28.6 PG (ref 26.1–32.9)
MCHC RBC AUTO-ENTMCNC: 31.1 G/DL (ref 31.4–35)
MCV RBC AUTO: 92 FL (ref 79.6–97.8)
MONOCYTES # BLD: 0.4 K/UL (ref 0.1–1.3)
MONOCYTES NFR BLD: 5 % (ref 4–12)
NEUTS SEG # BLD: 5.5 K/UL (ref 1.7–8.2)
NEUTS SEG NFR BLD: 70 % (ref 43–78)
NRBC # BLD: 0 K/UL (ref 0–0.2)
PLATELET # BLD AUTO: 280 K/UL (ref 150–450)
PMV BLD AUTO: 12.1 FL (ref 9.4–12.3)
PROT SERPL-MCNC: 8.1 G/DL (ref 6.3–8.2)
RBC # BLD AUTO: 3.98 M/UL (ref 4.05–5.2)
WBC # BLD AUTO: 7.9 K/UL (ref 4.3–11.1)

## 2020-08-31 PROCEDURE — 86140 C-REACTIVE PROTEIN: CPT

## 2020-08-31 PROCEDURE — 85025 COMPLETE CBC W/AUTO DIFF WBC: CPT

## 2020-08-31 PROCEDURE — G0299 HHS/HOSPICE OF RN EA 15 MIN: HCPCS

## 2020-08-31 PROCEDURE — 82565 ASSAY OF CREATININE: CPT

## 2020-08-31 PROCEDURE — 80076 HEPATIC FUNCTION PANEL: CPT

## 2020-09-02 ENCOUNTER — HOME CARE VISIT (OUTPATIENT)
Dept: SCHEDULING | Facility: HOME HEALTH | Age: 60
End: 2020-09-02
Payer: SUBSIDIZED

## 2020-09-02 VITALS
RESPIRATION RATE: 17 BRPM | DIASTOLIC BLOOD PRESSURE: 89 MMHG | HEART RATE: 75 BPM | SYSTOLIC BLOOD PRESSURE: 137 MMHG | TEMPERATURE: 97.7 F

## 2020-09-02 PROCEDURE — G0158 HHC OT ASSISTANT EA 15: HCPCS

## 2020-09-04 ENCOUNTER — HOME CARE VISIT (OUTPATIENT)
Dept: SCHEDULING | Facility: HOME HEALTH | Age: 60
End: 2020-09-04
Payer: SUBSIDIZED

## 2020-09-11 ENCOUNTER — HOME CARE VISIT (OUTPATIENT)
Dept: SCHEDULING | Facility: HOME HEALTH | Age: 60
End: 2020-09-11
Payer: SUBSIDIZED

## 2020-09-11 PROCEDURE — G0158 HHC OT ASSISTANT EA 15: HCPCS

## 2020-09-13 VITALS
TEMPERATURE: 97.6 F | HEART RATE: 90 BPM | DIASTOLIC BLOOD PRESSURE: 89 MMHG | RESPIRATION RATE: 16 BRPM | SYSTOLIC BLOOD PRESSURE: 140 MMHG

## 2020-09-15 ENCOUNTER — HOME CARE VISIT (OUTPATIENT)
Dept: SCHEDULING | Facility: HOME HEALTH | Age: 60
End: 2020-09-15
Payer: SUBSIDIZED

## 2020-09-15 VITALS
SYSTOLIC BLOOD PRESSURE: 152 MMHG | TEMPERATURE: 97.7 F | HEART RATE: 76 BPM | DIASTOLIC BLOOD PRESSURE: 82 MMHG | RESPIRATION RATE: 16 BRPM

## 2020-09-15 PROCEDURE — G0152 HHCP-SERV OF OT,EA 15 MIN: HCPCS

## 2021-04-05 ENCOUNTER — APPOINTMENT (OUTPATIENT)
Dept: GENERAL RADIOLOGY | Age: 61
DRG: 481 | End: 2021-04-05
Attending: EMERGENCY MEDICINE

## 2021-04-05 ENCOUNTER — HOSPITAL ENCOUNTER (INPATIENT)
Age: 61
LOS: 7 days | Discharge: HOME HEALTH | DRG: 481 | End: 2021-04-13
Attending: EMERGENCY MEDICINE | Admitting: ORTHOPAEDIC SURGERY

## 2021-04-05 DIAGNOSIS — S72.442A CLOSED DISPLACED FRACTURE OF DISTAL EPIPHYSIS OF LEFT FEMUR, INITIAL ENCOUNTER (HCC): Primary | ICD-10-CM
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
## 2021-04-05 PROCEDURE — 93005 ELECTROCARDIOGRAM TRACING: CPT | Performed by: EMERGENCY MEDICINE

## 2021-04-05 PROCEDURE — 99284 EMERGENCY DEPT VISIT MOD MDM: CPT

## 2021-04-05 PROCEDURE — 73562 X-RAY EXAM OF KNEE 3: CPT

## 2021-04-05 PROCEDURE — 71045 X-RAY EXAM CHEST 1 VIEW: CPT

## 2021-04-05 PROCEDURE — 73552 X-RAY EXAM OF FEMUR 2/>: CPT

## 2021-04-05 RX ORDER — FENTANYL CITRATE 50 UG/ML
50 INJECTION, SOLUTION INTRAMUSCULAR; INTRAVENOUS ONCE
Status: COMPLETED | OUTPATIENT
Start: 2021-04-05 | End: 2021-04-06

## 2021-04-05 RX ORDER — ONDANSETRON 2 MG/ML
8 INJECTION INTRAMUSCULAR; INTRAVENOUS
Status: COMPLETED | OUTPATIENT
Start: 2021-04-05 | End: 2021-04-06

## 2021-04-06 ENCOUNTER — APPOINTMENT (OUTPATIENT)
Dept: GENERAL RADIOLOGY | Age: 61
DRG: 481 | End: 2021-04-06
Attending: ORTHOPAEDIC SURGERY

## 2021-04-06 ENCOUNTER — ANESTHESIA (OUTPATIENT)
Dept: SURGERY | Age: 61
DRG: 481 | End: 2021-04-06

## 2021-04-06 ENCOUNTER — ANESTHESIA EVENT (OUTPATIENT)
Dept: SURGERY | Age: 61
DRG: 481 | End: 2021-04-06

## 2021-04-06 PROBLEM — S72.92XA FEMUR FRACTURE, LEFT (HCC): Status: ACTIVE | Noted: 2021-04-06

## 2021-04-06 PROBLEM — S72.402A CLOSED FRACTURE OF LEFT DISTAL FEMUR (HCC): Status: ACTIVE | Noted: 2021-04-06

## 2021-04-06 LAB
ABO + RH BLD: NORMAL
ALBUMIN SERPL-MCNC: 3.2 G/DL (ref 3.5–5)
ALBUMIN/GLOB SERPL: 0.8 {RATIO} (ref 1.2–3.5)
ALP SERPL-CCNC: 136 U/L (ref 50–136)
ALT SERPL-CCNC: 22 U/L (ref 12–65)
ANION GAP SERPL CALC-SCNC: 6 MMOL/L (ref 7–16)
APPEARANCE UR: CLEAR
AST SERPL-CCNC: 13 U/L (ref 15–37)
ATRIAL RATE: 89 BPM
BASOPHILS # BLD: 0.1 K/UL (ref 0–0.2)
BASOPHILS NFR BLD: 0 % (ref 0–2)
BILIRUB SERPL-MCNC: 0.4 MG/DL (ref 0.2–1.1)
BILIRUB UR QL: NEGATIVE
BLOOD GROUP ANTIBODIES SERPL: NORMAL
BUN SERPL-MCNC: 20 MG/DL (ref 6–23)
CALCIUM SERPL-MCNC: 9.3 MG/DL (ref 8.3–10.4)
CALCULATED P AXIS, ECG09: 73 DEGREES
CALCULATED R AXIS, ECG10: 20 DEGREES
CALCULATED T AXIS, ECG11: 23 DEGREES
CHLORIDE SERPL-SCNC: 103 MMOL/L (ref 98–107)
CO2 SERPL-SCNC: 30 MMOL/L (ref 21–32)
COLOR UR: YELLOW
CREAT SERPL-MCNC: 0.7 MG/DL (ref 0.6–1)
DIAGNOSIS, 93000: NORMAL
DIFFERENTIAL METHOD BLD: ABNORMAL
EOSINOPHIL # BLD: 0.1 K/UL (ref 0–0.8)
EOSINOPHIL NFR BLD: 0 % (ref 0.5–7.8)
ERYTHROCYTE [DISTWIDTH] IN BLOOD BY AUTOMATED COUNT: 14.4 % (ref 11.9–14.6)
GLOBULIN SER CALC-MCNC: 4 G/DL (ref 2.3–3.5)
GLUCOSE SERPL-MCNC: 135 MG/DL (ref 65–100)
GLUCOSE UR STRIP.AUTO-MCNC: NEGATIVE MG/DL
HCT VFR BLD AUTO: 41.1 % (ref 35.8–46.3)
HGB BLD-MCNC: 12.7 G/DL (ref 11.7–15.4)
HGB UR QL STRIP: NEGATIVE
IMM GRANULOCYTES # BLD AUTO: 0.2 K/UL (ref 0–0.5)
IMM GRANULOCYTES NFR BLD AUTO: 1 % (ref 0–5)
INR PPP: 1
KETONES UR QL STRIP.AUTO: NEGATIVE MG/DL
LEUKOCYTE ESTERASE UR QL STRIP.AUTO: NEGATIVE
LYMPHOCYTES # BLD: 1.5 K/UL (ref 0.5–4.6)
LYMPHOCYTES NFR BLD: 8 % (ref 13–44)
MAGNESIUM SERPL-MCNC: 2.2 MG/DL (ref 1.8–2.4)
MCH RBC QN AUTO: 28.2 PG (ref 26.1–32.9)
MCHC RBC AUTO-ENTMCNC: 30.9 G/DL (ref 31.4–35)
MCV RBC AUTO: 91.3 FL (ref 79.6–97.8)
MONOCYTES # BLD: 0.6 K/UL (ref 0.1–1.3)
MONOCYTES NFR BLD: 3 % (ref 4–12)
NEUTS SEG # BLD: 17.2 K/UL (ref 1.7–8.2)
NEUTS SEG NFR BLD: 88 % (ref 43–78)
NITRITE UR QL STRIP.AUTO: NEGATIVE
NRBC # BLD: 0 K/UL (ref 0–0.2)
P-R INTERVAL, ECG05: 144 MS
PH UR STRIP: 6.5 [PH] (ref 5–9)
PLATELET # BLD AUTO: 243 K/UL (ref 150–450)
PMV BLD AUTO: 12 FL (ref 9.4–12.3)
POTASSIUM SERPL-SCNC: 3.5 MMOL/L (ref 3.5–5.1)
PROT SERPL-MCNC: 7.2 G/DL (ref 6.3–8.2)
PROT UR STRIP-MCNC: NEGATIVE MG/DL
PROTHROMBIN TIME: 13 SEC (ref 12.5–14.7)
Q-T INTERVAL, ECG07: 330 MS
QRS DURATION, ECG06: 94 MS
QTC CALCULATION (BEZET), ECG08: 401 MS
RBC # BLD AUTO: 4.5 M/UL (ref 4.05–5.2)
SODIUM SERPL-SCNC: 139 MMOL/L (ref 136–145)
SP GR UR REFRACTOMETRY: 1.02 (ref 1–1.02)
SPECIMEN EXP DATE BLD: NORMAL
UROBILINOGEN UR QL STRIP.AUTO: 0.2 EU/DL (ref 0.2–1)
VENTRICULAR RATE, ECG03: 89 BPM
WBC # BLD AUTO: 19.6 K/UL (ref 4.3–11.1)

## 2021-04-06 PROCEDURE — 85610 PROTHROMBIN TIME: CPT

## 2021-04-06 PROCEDURE — 2709999900 HC NON-CHARGEABLE SUPPLY: Performed by: ORTHOPAEDIC SURGERY

## 2021-04-06 PROCEDURE — 86901 BLOOD TYPING SEROLOGIC RH(D): CPT

## 2021-04-06 PROCEDURE — 77030037088 HC TUBE ENDOTRACH ORAL NSL COVD-A: Performed by: ANESTHESIOLOGY

## 2021-04-06 PROCEDURE — 2709999900 HC NON-CHARGEABLE SUPPLY

## 2021-04-06 PROCEDURE — 36415 COLL VENOUS BLD VENIPUNCTURE: CPT

## 2021-04-06 PROCEDURE — 94760 N-INVAS EAR/PLS OXIMETRY 1: CPT

## 2021-04-06 PROCEDURE — 77030020274 HC MISC IMPL ORTHOPEDIC: Performed by: ORTHOPAEDIC SURGERY

## 2021-04-06 PROCEDURE — 77010033678 HC OXYGEN DAILY

## 2021-04-06 PROCEDURE — 77030020275 HC MISC ORTHOPEDIC: Performed by: ORTHOPAEDIC SURGERY

## 2021-04-06 PROCEDURE — 96374 THER/PROPH/DIAG INJ IV PUSH: CPT

## 2021-04-06 PROCEDURE — 74011000250 HC RX REV CODE- 250: Performed by: ANESTHESIOLOGY

## 2021-04-06 PROCEDURE — 77030018673: Performed by: ORTHOPAEDIC SURGERY

## 2021-04-06 PROCEDURE — 83735 ASSAY OF MAGNESIUM: CPT

## 2021-04-06 PROCEDURE — 76010000161 HC OR TIME 1 TO 1.5 HR INTENSV-TIER 1: Performed by: ORTHOPAEDIC SURGERY

## 2021-04-06 PROCEDURE — 76210000016 HC OR PH I REC 1 TO 1.5 HR: Performed by: ORTHOPAEDIC SURGERY

## 2021-04-06 PROCEDURE — 27506 TREATMENT OF THIGH FRACTURE: CPT | Performed by: ORTHOPAEDIC SURGERY

## 2021-04-06 PROCEDURE — 74011000250 HC RX REV CODE- 250: Performed by: NURSE ANESTHETIST, CERTIFIED REGISTERED

## 2021-04-06 PROCEDURE — 77030014405 HC GD ROD RMR SYNT -C: Performed by: ORTHOPAEDIC SURGERY

## 2021-04-06 PROCEDURE — 85025 COMPLETE CBC W/AUTO DIFF WBC: CPT

## 2021-04-06 PROCEDURE — 77030014383 HC ENDCAP SUPCNDYL STRY -C: Performed by: ORTHOPAEDIC SURGERY

## 2021-04-06 PROCEDURE — 77030002933 HC SUT MCRYL J&J -A: Performed by: ORTHOPAEDIC SURGERY

## 2021-04-06 PROCEDURE — 74011250637 HC RX REV CODE- 250/637: Performed by: NURSE PRACTITIONER

## 2021-04-06 PROCEDURE — 77030003746: Performed by: ORTHOPAEDIC SURGERY

## 2021-04-06 PROCEDURE — C1769 GUIDE WIRE: HCPCS | Performed by: ORTHOPAEDIC SURGERY

## 2021-04-06 PROCEDURE — 74011250636 HC RX REV CODE- 250/636: Performed by: ANESTHESIOLOGY

## 2021-04-06 PROCEDURE — 80053 COMPREHEN METABOLIC PANEL: CPT

## 2021-04-06 PROCEDURE — 74011250636 HC RX REV CODE- 250/636

## 2021-04-06 PROCEDURE — 77030021678 HC GLIDESCP STAT DISP VERT -B: Performed by: ANESTHESIOLOGY

## 2021-04-06 PROCEDURE — 96376 TX/PRO/DX INJ SAME DRUG ADON: CPT

## 2021-04-06 PROCEDURE — 76060000033 HC ANESTHESIA 1 TO 1.5 HR: Performed by: ORTHOPAEDIC SURGERY

## 2021-04-06 PROCEDURE — 74011000302 HC RX REV CODE- 302: Performed by: ORTHOPAEDIC SURGERY

## 2021-04-06 PROCEDURE — 96375 TX/PRO/DX INJ NEW DRUG ADDON: CPT

## 2021-04-06 PROCEDURE — 86580 TB INTRADERMAL TEST: CPT | Performed by: ORTHOPAEDIC SURGERY

## 2021-04-06 PROCEDURE — 81003 URINALYSIS AUTO W/O SCOPE: CPT

## 2021-04-06 PROCEDURE — 77030019908 HC STETH ESOPH SIMS -A: Performed by: ANESTHESIOLOGY

## 2021-04-06 PROCEDURE — 74011250636 HC RX REV CODE- 250/636: Performed by: NURSE ANESTHETIST, CERTIFIED REGISTERED

## 2021-04-06 PROCEDURE — 74011250636 HC RX REV CODE- 250/636: Performed by: EMERGENCY MEDICINE

## 2021-04-06 PROCEDURE — 77030016449 HC BIT DRL AO1 STRY -C: Performed by: ORTHOPAEDIC SURGERY

## 2021-04-06 PROCEDURE — 74011250636 HC RX REV CODE- 250/636: Performed by: ORTHOPAEDIC SURGERY

## 2021-04-06 PROCEDURE — 74011250637 HC RX REV CODE- 250/637: Performed by: ORTHOPAEDIC SURGERY

## 2021-04-06 PROCEDURE — 74011000258 HC RX REV CODE- 258: Performed by: ORTHOPAEDIC SURGERY

## 2021-04-06 PROCEDURE — 77030021107 HC SHFT RMR MOD DISP STRY -D: Performed by: ORTHOPAEDIC SURGERY

## 2021-04-06 PROCEDURE — 65270000029 HC RM PRIVATE

## 2021-04-06 PROCEDURE — 77030008462 HC STPLR SKN PROX J&J -A: Performed by: ORTHOPAEDIC SURGERY

## 2021-04-06 PROCEDURE — 73552 X-RAY EXAM OF FEMUR 2/>: CPT

## 2021-04-06 PROCEDURE — C1713 ANCHOR/SCREW BN/BN,TIS/BN: HCPCS | Performed by: ORTHOPAEDIC SURGERY

## 2021-04-06 PROCEDURE — 77030017016 HC DSG ANTIMIC BARR2 S&N -B: Performed by: ORTHOPAEDIC SURGERY

## 2021-04-06 PROCEDURE — 99222 1ST HOSP IP/OBS MODERATE 55: CPT | Performed by: ORTHOPAEDIC SURGERY

## 2021-04-06 PROCEDURE — 77030039425 HC BLD LARYNG TRULITE DISP TELE -A: Performed by: ANESTHESIOLOGY

## 2021-04-06 PROCEDURE — 99218 HC RM OBSERVATION: CPT

## 2021-04-06 PROCEDURE — 0QS906Z REPOSITION LEFT FEMORAL SHAFT WITH INTRAMEDULLARY INTERNAL FIXATION DEVICE, OPEN APPROACH: ICD-10-PCS | Performed by: ORTHOPAEDIC SURGERY

## 2021-04-06 DEVICE — END CAP
Type: IMPLANTABLE DEVICE | Site: FEMUR | Status: FUNCTIONAL
Brand: T2

## 2021-04-06 DEVICE — LOCKING SCREW, FULLY THREADED: Type: IMPLANTABLE DEVICE | Site: FEMUR | Status: FUNCTIONAL

## 2021-04-06 DEVICE — FEMORAL NAIL, A,R, R1500
Type: IMPLANTABLE DEVICE | Site: FEMUR | Status: FUNCTIONAL
Brand: T2

## 2021-04-06 RX ORDER — POLYETHYLENE GLYCOL 3350 17 G/17G
17 POWDER, FOR SOLUTION ORAL DAILY PRN
Status: DISCONTINUED | OUTPATIENT
Start: 2021-04-06 | End: 2021-04-13 | Stop reason: HOSPADM

## 2021-04-06 RX ORDER — ACETAMINOPHEN 325 MG/1
650 TABLET ORAL
Status: DISCONTINUED | OUTPATIENT
Start: 2021-04-06 | End: 2021-04-06 | Stop reason: SDUPTHER

## 2021-04-06 RX ORDER — NEOSTIGMINE METHYLSULFATE 1 MG/ML
INJECTION, SOLUTION INTRAVENOUS AS NEEDED
Status: DISCONTINUED | OUTPATIENT
Start: 2021-04-06 | End: 2021-04-06 | Stop reason: HOSPADM

## 2021-04-06 RX ORDER — ONDANSETRON 2 MG/ML
4 INJECTION INTRAMUSCULAR; INTRAVENOUS
Status: DISCONTINUED | OUTPATIENT
Start: 2021-04-06 | End: 2021-04-13 | Stop reason: HOSPADM

## 2021-04-06 RX ORDER — OXYCODONE HYDROCHLORIDE 5 MG/1
5-10 TABLET ORAL
Status: DISCONTINUED | OUTPATIENT
Start: 2021-04-06 | End: 2021-04-13 | Stop reason: HOSPADM

## 2021-04-06 RX ORDER — SODIUM CHLORIDE 0.9 % (FLUSH) 0.9 %
5-40 SYRINGE (ML) INJECTION EVERY 8 HOURS
Status: DISCONTINUED | OUTPATIENT
Start: 2021-04-06 | End: 2021-04-13 | Stop reason: HOSPADM

## 2021-04-06 RX ORDER — HYDROMORPHONE HYDROCHLORIDE 1 MG/ML
INJECTION, SOLUTION INTRAMUSCULAR; INTRAVENOUS; SUBCUTANEOUS
Status: COMPLETED
Start: 2021-04-06 | End: 2021-04-06

## 2021-04-06 RX ORDER — SODIUM CHLORIDE, SODIUM LACTATE, POTASSIUM CHLORIDE, CALCIUM CHLORIDE 600; 310; 30; 20 MG/100ML; MG/100ML; MG/100ML; MG/100ML
75 INJECTION, SOLUTION INTRAVENOUS CONTINUOUS
Status: DISCONTINUED | OUTPATIENT
Start: 2021-04-06 | End: 2021-04-06 | Stop reason: HOSPADM

## 2021-04-06 RX ORDER — SODIUM CHLORIDE 0.9 % (FLUSH) 0.9 %
5-40 SYRINGE (ML) INJECTION AS NEEDED
Status: DISCONTINUED | OUTPATIENT
Start: 2021-04-06 | End: 2021-04-13 | Stop reason: HOSPADM

## 2021-04-06 RX ORDER — LISINOPRIL AND HYDROCHLOROTHIAZIDE 10; 12.5 MG/1; MG/1
2 TABLET ORAL DAILY
Status: DISCONTINUED | OUTPATIENT
Start: 2021-04-06 | End: 2021-04-13 | Stop reason: HOSPADM

## 2021-04-06 RX ORDER — OXYCODONE HYDROCHLORIDE 5 MG/1
10 TABLET ORAL
Status: DISCONTINUED | OUTPATIENT
Start: 2021-04-06 | End: 2021-04-06

## 2021-04-06 RX ORDER — SODIUM CHLORIDE, SODIUM LACTATE, POTASSIUM CHLORIDE, CALCIUM CHLORIDE 600; 310; 30; 20 MG/100ML; MG/100ML; MG/100ML; MG/100ML
INJECTION, SOLUTION INTRAVENOUS
Status: DISCONTINUED | OUTPATIENT
Start: 2021-04-06 | End: 2021-04-06 | Stop reason: HOSPADM

## 2021-04-06 RX ORDER — SODIUM CHLORIDE 0.9 % (FLUSH) 0.9 %
5-40 SYRINGE (ML) INJECTION AS NEEDED
Status: DISCONTINUED | OUTPATIENT
Start: 2021-04-06 | End: 2021-04-06 | Stop reason: HOSPADM

## 2021-04-06 RX ORDER — ACETAMINOPHEN 650 MG/1
650 SUPPOSITORY RECTAL
Status: DISCONTINUED | OUTPATIENT
Start: 2021-04-06 | End: 2021-04-06 | Stop reason: SDUPTHER

## 2021-04-06 RX ORDER — NALOXONE HYDROCHLORIDE 0.4 MG/ML
0.2 INJECTION, SOLUTION INTRAMUSCULAR; INTRAVENOUS; SUBCUTANEOUS AS NEEDED
Status: DISCONTINUED | OUTPATIENT
Start: 2021-04-06 | End: 2021-04-06 | Stop reason: HOSPADM

## 2021-04-06 RX ORDER — ROCURONIUM BROMIDE 10 MG/ML
INJECTION, SOLUTION INTRAVENOUS AS NEEDED
Status: DISCONTINUED | OUTPATIENT
Start: 2021-04-06 | End: 2021-04-06 | Stop reason: HOSPADM

## 2021-04-06 RX ORDER — HYDROMORPHONE HYDROCHLORIDE 1 MG/ML
0.5 INJECTION, SOLUTION INTRAMUSCULAR; INTRAVENOUS; SUBCUTANEOUS
Status: DISCONTINUED | OUTPATIENT
Start: 2021-04-06 | End: 2021-04-13 | Stop reason: HOSPADM

## 2021-04-06 RX ORDER — HYDROMORPHONE HYDROCHLORIDE 1 MG/ML
1 INJECTION, SOLUTION INTRAMUSCULAR; INTRAVENOUS; SUBCUTANEOUS
Status: DISCONTINUED | OUTPATIENT
Start: 2021-04-06 | End: 2021-04-06 | Stop reason: SDUPTHER

## 2021-04-06 RX ORDER — ONDANSETRON 2 MG/ML
4 INJECTION INTRAMUSCULAR; INTRAVENOUS
Status: DISCONTINUED | OUTPATIENT
Start: 2021-04-06 | End: 2021-04-06 | Stop reason: SDUPTHER

## 2021-04-06 RX ORDER — MIDAZOLAM HYDROCHLORIDE 1 MG/ML
INJECTION, SOLUTION INTRAMUSCULAR; INTRAVENOUS AS NEEDED
Status: DISCONTINUED | OUTPATIENT
Start: 2021-04-06 | End: 2021-04-06 | Stop reason: HOSPADM

## 2021-04-06 RX ORDER — FENTANYL CITRATE 50 UG/ML
INJECTION, SOLUTION INTRAMUSCULAR; INTRAVENOUS AS NEEDED
Status: DISCONTINUED | OUTPATIENT
Start: 2021-04-06 | End: 2021-04-06 | Stop reason: HOSPADM

## 2021-04-06 RX ORDER — SODIUM CHLORIDE 0.9 % (FLUSH) 0.9 %
5-40 SYRINGE (ML) INJECTION EVERY 8 HOURS
Status: DISCONTINUED | OUTPATIENT
Start: 2021-04-06 | End: 2021-04-06 | Stop reason: HOSPADM

## 2021-04-06 RX ORDER — LIDOCAINE HYDROCHLORIDE 20 MG/ML
INJECTION, SOLUTION EPIDURAL; INFILTRATION; INTRACAUDAL; PERINEURAL AS NEEDED
Status: DISCONTINUED | OUTPATIENT
Start: 2021-04-06 | End: 2021-04-06 | Stop reason: HOSPADM

## 2021-04-06 RX ORDER — GLYCOPYRROLATE 0.2 MG/ML
INJECTION INTRAMUSCULAR; INTRAVENOUS AS NEEDED
Status: DISCONTINUED | OUTPATIENT
Start: 2021-04-06 | End: 2021-04-06 | Stop reason: HOSPADM

## 2021-04-06 RX ORDER — ASPIRIN 325 MG
325 TABLET, DELAYED RELEASE (ENTERIC COATED) ORAL DAILY
Status: DISCONTINUED | OUTPATIENT
Start: 2021-04-07 | End: 2021-04-13 | Stop reason: HOSPADM

## 2021-04-06 RX ORDER — PROMETHAZINE HYDROCHLORIDE 25 MG/1
12.5 TABLET ORAL
Status: DISCONTINUED | OUTPATIENT
Start: 2021-04-06 | End: 2021-04-13 | Stop reason: HOSPADM

## 2021-04-06 RX ORDER — ACETAMINOPHEN 325 MG/1
650 TABLET ORAL
Status: DISCONTINUED | OUTPATIENT
Start: 2021-04-06 | End: 2021-04-13 | Stop reason: HOSPADM

## 2021-04-06 RX ORDER — FENTANYL CITRATE 50 UG/ML
100 INJECTION, SOLUTION INTRAMUSCULAR; INTRAVENOUS ONCE
Status: COMPLETED | OUTPATIENT
Start: 2021-04-06 | End: 2021-04-06

## 2021-04-06 RX ORDER — PROPOFOL 10 MG/ML
INJECTION, EMULSION INTRAVENOUS AS NEEDED
Status: DISCONTINUED | OUTPATIENT
Start: 2021-04-06 | End: 2021-04-06 | Stop reason: HOSPADM

## 2021-04-06 RX ORDER — HYDROMORPHONE HYDROCHLORIDE 1 MG/ML
0.5 INJECTION, SOLUTION INTRAMUSCULAR; INTRAVENOUS; SUBCUTANEOUS
Status: DISCONTINUED | OUTPATIENT
Start: 2021-04-06 | End: 2021-04-06 | Stop reason: HOSPADM

## 2021-04-06 RX ORDER — ACETAMINOPHEN 650 MG/1
650 SUPPOSITORY RECTAL
Status: DISCONTINUED | OUTPATIENT
Start: 2021-04-06 | End: 2021-04-13 | Stop reason: HOSPADM

## 2021-04-06 RX ORDER — OXYCODONE AND ACETAMINOPHEN 5; 325 MG/1; MG/1
1 TABLET ORAL AS NEEDED
Status: DISCONTINUED | OUTPATIENT
Start: 2021-04-06 | End: 2021-04-06 | Stop reason: HOSPADM

## 2021-04-06 RX ORDER — DEXAMETHASONE SODIUM PHOSPHATE 4 MG/ML
INJECTION, SOLUTION INTRA-ARTICULAR; INTRALESIONAL; INTRAMUSCULAR; INTRAVENOUS; SOFT TISSUE AS NEEDED
Status: DISCONTINUED | OUTPATIENT
Start: 2021-04-06 | End: 2021-04-06 | Stop reason: HOSPADM

## 2021-04-06 RX ORDER — ONDANSETRON 2 MG/ML
INJECTION INTRAMUSCULAR; INTRAVENOUS AS NEEDED
Status: DISCONTINUED | OUTPATIENT
Start: 2021-04-06 | End: 2021-04-06 | Stop reason: HOSPADM

## 2021-04-06 RX ADMIN — GLYCOPYRROLATE 0.6 MG: 0.2 INJECTION, SOLUTION INTRAMUSCULAR; INTRAVENOUS at 13:12

## 2021-04-06 RX ADMIN — SODIUM CHLORIDE 1000 ML: 900 INJECTION, SOLUTION INTRAVENOUS at 00:24

## 2021-04-06 RX ADMIN — HYDROMORPHONE HYDROCHLORIDE 0.5 MG: 1 INJECTION, SOLUTION INTRAMUSCULAR; INTRAVENOUS; SUBCUTANEOUS at 13:42

## 2021-04-06 RX ADMIN — FENTANYL CITRATE 25 MCG: 50 INJECTION INTRAMUSCULAR; INTRAVENOUS at 12:55

## 2021-04-06 RX ADMIN — PROPOFOL 200 MG: 10 INJECTION, EMULSION INTRAVENOUS at 12:33

## 2021-04-06 RX ADMIN — ROCURONIUM BROMIDE 30 MG: 10 INJECTION, SOLUTION INTRAVENOUS at 12:33

## 2021-04-06 RX ADMIN — Medication 5 ML: at 02:14

## 2021-04-06 RX ADMIN — Medication 5 ML: at 21:48

## 2021-04-06 RX ADMIN — CEFAZOLIN SODIUM 1 G: 1 INJECTION, POWDER, FOR SOLUTION INTRAMUSCULAR; INTRAVENOUS at 21:48

## 2021-04-06 RX ADMIN — FENTANYL CITRATE 50 MCG: 50 INJECTION, SOLUTION INTRAMUSCULAR; INTRAVENOUS at 00:25

## 2021-04-06 RX ADMIN — MIDAZOLAM 2 MG: 1 INJECTION INTRAMUSCULAR; INTRAVENOUS at 12:23

## 2021-04-06 RX ADMIN — PHENYLEPHRINE HYDROCHLORIDE 100 MCG: 10 INJECTION INTRAVENOUS at 12:44

## 2021-04-06 RX ADMIN — PROMETHAZINE HYDROCHLORIDE 6.25 MG: 25 INJECTION INTRAMUSCULAR; INTRAVENOUS at 14:16

## 2021-04-06 RX ADMIN — DEXAMETHASONE SODIUM PHOSPHATE 10 MG: 4 INJECTION, SOLUTION INTRAMUSCULAR; INTRAVENOUS at 12:43

## 2021-04-06 RX ADMIN — LIDOCAINE HYDROCHLORIDE 50 MG: 20 INJECTION, SOLUTION EPIDURAL; INFILTRATION; INTRACAUDAL; PERINEURAL at 12:33

## 2021-04-06 RX ADMIN — ONDANSETRON 4 MG: 2 INJECTION INTRAMUSCULAR; INTRAVENOUS at 12:43

## 2021-04-06 RX ADMIN — OXYCODONE 10 MG: 5 TABLET ORAL at 21:48

## 2021-04-06 RX ADMIN — Medication 5 MG: at 13:12

## 2021-04-06 RX ADMIN — OXYCODONE 10 MG: 5 TABLET ORAL at 05:30

## 2021-04-06 RX ADMIN — TUBERCULIN PURIFIED PROTEIN DERIVATIVE 5 UNITS: 5 INJECTION, SOLUTION INTRADERMAL at 08:11

## 2021-04-06 RX ADMIN — Medication 10 ML: at 08:14

## 2021-04-06 RX ADMIN — SODIUM CHLORIDE, SODIUM LACTATE, POTASSIUM CHLORIDE, AND CALCIUM CHLORIDE: 600; 310; 30; 20 INJECTION, SOLUTION INTRAVENOUS at 12:19

## 2021-04-06 RX ADMIN — ROCURONIUM BROMIDE 10 MG: 10 INJECTION, SOLUTION INTRAVENOUS at 12:37

## 2021-04-06 RX ADMIN — CEFAZOLIN 3 G: 1 INJECTION, POWDER, FOR SOLUTION INTRAVENOUS at 12:39

## 2021-04-06 RX ADMIN — FENTANYL CITRATE 50 MCG: 50 INJECTION INTRAMUSCULAR; INTRAVENOUS at 12:33

## 2021-04-06 RX ADMIN — FENTANYL CITRATE 100 MCG: 50 INJECTION, SOLUTION INTRAMUSCULAR; INTRAVENOUS at 02:13

## 2021-04-06 RX ADMIN — FENTANYL CITRATE 25 MCG: 50 INJECTION INTRAMUSCULAR; INTRAVENOUS at 12:50

## 2021-04-06 RX ADMIN — Medication 10 ML: at 16:43

## 2021-04-06 RX ADMIN — HYDROMORPHONE HYDROCHLORIDE 0.5 MG: 1 INJECTION, SOLUTION INTRAMUSCULAR; INTRAVENOUS; SUBCUTANEOUS at 13:50

## 2021-04-06 RX ADMIN — PHENYLEPHRINE HYDROCHLORIDE 100 MCG: 10 INJECTION INTRAVENOUS at 13:06

## 2021-04-06 RX ADMIN — ONDANSETRON 8 MG: 2 INJECTION INTRAMUSCULAR; INTRAVENOUS at 00:25

## 2021-04-06 RX ADMIN — HYDROMORPHONE HYDROCHLORIDE 0.5 MG: 1 INJECTION, SOLUTION INTRAMUSCULAR; INTRAVENOUS; SUBCUTANEOUS at 14:10

## 2021-04-06 RX ADMIN — OXYCODONE 10 MG: 5 TABLET ORAL at 00:54

## 2021-04-06 RX ADMIN — HYDROMORPHONE HYDROCHLORIDE 0.5 MG: 1 INJECTION, SOLUTION INTRAMUSCULAR; INTRAVENOUS; SUBCUTANEOUS at 08:31

## 2021-04-06 NOTE — ED TRIAGE NOTES
Arrives with face mask in place. S/p fall at work tonight while walking out of bathroom. States has been using rolling walker since 9/2020. Reports slipped causing fall. Landed cross-legged with left leg under body. Pain to left thigh. Swelling to site.  +PMS distal to injurt

## 2021-04-06 NOTE — OP NOTES
Operative Report    Patient: Jocelyn Suazo MRN: [id number]  SSN: [id number]    YOB: 1960  Age: 64 y.o. Sex: female       Date of Surgery: 4/6/2021     History:  Jocelyn Suazo is a 64 y.o. female who fell and injured her left knee. She fell at work from a standing height per her history. She was seen in the emergency room and found to have a left displaced distal femoral shaft fracture. She seemed to have this is essentially an isolated injury. I did talk to her about the need for operative fixation of this completely displaced unstable distal femur fracture and I try to explain exactly what this would involve. I try to make sure she understood exactly where her incisions would be and what the procedure would involve after talking her about this she seemed to feel comfortable consenting. I talked to the patient and/or their representative and explained the exact nature the procedure. I also went through a detailed list of the material risks associated with  the procedure which included risk of bleeding, infection, injury to nearby structures, worsening the situation, as well as the risks associate with anesthesia and finally death. Also talked with him regarding the benefits and alternatives to the procedure. Preoperative Diagnosis: Left Distal Femur Fracture     Postoperative Diagnosis: Closed displaced left distal femoral shaft fracture    Surgeon(s) and Role:     * Rowdy Phoenix MD - Primary    Anesthesia: Spinal     Procedure: Procedure(s):  Open treatment of left femoral shaft fracture with retrograde intramedullary nail fixation  Procedure in Detail: After the successful duction of general anesthetic the left lower extremity was prepped and draped in usual sterile fashion.   I made a small incision of the anterior aspect of the left knee and placed a guidewire in the center of the distal femur on the AP projection and just in the area of the trochlea on the lateral projection making sure this was in line with the femoral shaft. After the guidewire was in appropriate position I open with the cannulated awl and then placed a guidewire across the fracture site into the proximal femur. I then measured for a 380 mm nail. I reamed up to 13 mm and placed a 12 x 380 mm nail. Once the nail was in appropriate position I pulled some gentle traction and placed 3 interlock bolts distally using the outrigger device 2 of these were in the static position I then placed a single anterior to posterior screw proximally and after these were in place I  the overall reduction was a place my hardware was very pleased with this I removed the insertion handle and placed a 0 set screw in the distal portion of the nail to lock the most distal interlock bolt to the nail after this was done I checked the final reduction as well as a place my hardware I was pleased with this. I then closed with two-point 0 Monocryl with subtenons tissue and staples for the skin. Dressings were applied. The patient was awakened and taken to cover him in stable condition there were no apparent complications      Estimated Blood Loss: 240 cc    Tourniquet Time: * No tourniquets in log *      Implants:   Implant Name Type Inv.  Item Serial No.  Lot No. LRB No. Used Action   NAIL FEM T2  [id number] -- STRL - [id number]  NAIL FEM T2  [id number] -- STRL  [employer] Therma FliteTypeform [id number] Left 1 Implanted   locking screw fully threaded 05 x 40mm    [employer] [id number] Left 1 Implanted   locking screw fully threaded 5x75mm    [employer] [id number] Left 1 Implanted   SCREW BNE L65MM DIA5MM YOLANDA TI DELMIS FULL THRD FOR T2 GTR - [id number]  SCREW BNE L65MM DIA5MM YOLANDA TI DELMIS FULL THRD FOR T2 GTR  [employer] Therma FliteTypeform [id number] Left 1 Implanted   END CAP SUPCNDYL NL T2 STD TI --  - [id number]  END CAP SUPCNDYL NL T2 STD TI --   [employer] Therma FliteTypeform [id number] Left 1 Implanted   SCR BNE LCK T2 FT 5X70MM TI -- STRL - [id number]  SCR BNE LCK T2 FT 5X70MM TI -- STRL  [employer] HOWM_WD [id number] Left 1 Implanted               Specimens: * No specimens in log *        Drains: None                Complications: None    Counts: Sponge and needle counts were correct times two.     Signed By:  Jin Kaplan MD     April 6, 2021

## 2021-04-06 NOTE — ROUTINE PROCESS
TRANSFER - OUT REPORT: 
 
Verbal report given to Giovanni Durant RN on Anotni Cano  being transferred to Pre Op for ordered procedure Report consisted of patients Situation, Background, Assessment and  
Recommendations(SBAR). Information from the following report(s) SBAR was reviewed with the receiving nurse. Lines:  
Peripheral IV 04/06/21 Left Hand (Active) Peripheral IV 04/06/21 Right Antecubital (Active) Opportunity for questions and clarification was provided.

## 2021-04-06 NOTE — PROGRESS NOTES
[facility] Brief H&P    Patient ID:  Name: Hemal Rose  MRN: [id number]  AGE: 64 y.o.  : 1960    Date of Admission:  2021     Lori Sanchez left leg pain after fall      HPI: This is a 65-year-old female with a medical history of spine osteomyelitis, morbid obesity, hypertension who was at work tonight and had a fall after walking out of the bathroom. She was reported by the emergency room physician to have done a split and developed severe left leg pain. She was unable to bear weight. She landed with her left leg under her body. Patient has been utilizing a rolling walker since 2020 after her spinal osteomyelitis admission. She is been under the care of infectious disease and is no longer on antibiotics. (Subjective information was taken by emergency room physicians report and chart notes)     Past Medical History Includes:   Past Medical History:   Diagnosis Date    Adrenal adenoma, right 2020    Arthritis 2020    Hypertension     Nausea and vomiting 2020    Sepsis due to methicillin susceptible Staphylococcus aureus (MSSA) without acute organ dysfunction ([address] 2020   ,   Past Surgical History:   Procedure Laterality Date    HX ORTHOPAEDIC       Family History: No family history on file.    Social History:   Social History     Tobacco Use    Smoking status: Never Smoker   Substance Use Topics    Alcohol use: No       ALLERGIES:   Allergies   Allergen Reactions    Lortab [Hydrocodone-Acetaminophen] Nausea and Vomiting        Patient Medications    Current Facility-Administered Medications   Medication Dose Route Frequency    sodium chloride (NS) flush 5-40 mL  5-40 mL IntraVENous Q8H    sodium chloride (NS) flush 5-40 mL  5-40 mL IntraVENous PRN    acetaminophen (TYLENOL) tablet 650 mg  650 mg Oral Q6H PRN    Or    acetaminophen (TYLENOL) suppository 650 mg  650 mg Rectal Q6H PRN    polyethylene glycol (MIRALAX) packet 17 g 17 g Oral DAILY PRN    ondansetron (ZOFRAN) injection 4 mg  4 mg IntraVENous Q6H PRN    HYDROmorphone (DILAUDID) injection 1 mg  1 mg IntraVENous Q4H PRN    oxyCODONE IR (ROXICODONE) tablet 5-10 mg  5-10 mg Oral Q4H PRN    lisinopril-hydroCHLOROthiazide (PRINZIDE, ZESTORETIC) 10-12.5 mg per tablet 2 Tab  2 Tab Oral DAILY     Current Outpatient Medications   Medication Sig    cephALEXin (KEFLEX) 500 mg capsule Take 500 mg by mouth four (4) times daily.  oxyCODONE-acetaminophen (PERCOCET) 5-325 mg per tablet Take 1 Tab by mouth every four (4) hours as needed for Pain.  heparin sod,porcine/0.9 % NaCl (HEPARIN FLUSH IV) 5 mL by IntraVENous route daily.  sodium chloride (Normal Saline Flush) 5-10 mL by IntraVENous route daily.  acetaminophen (TYLENOL) 500 mg tablet Take 500 mg by mouth every six (6) hours as needed for Pain.  ceFAZolin (ANCEF) in sterile water 2 gram/20 mL 2 g IV syringe 20 mL by IntraVENous route every eight (8) hours.  polyethylene glycol (MIRALAX) 17 gram packet Take 1 Packet by mouth daily. (Patient taking differently: Take 1 Packet by mouth daily. Stir and dissolve one packet of powder in any 4 to 8 ounces of beverage (cold, hot or room temperature) then drink. Indications: constipation)    lisinopril-hydroCHLOROthiazide (PRINZIDE, ZESTORETIC) 20-25 mg per tablet Take 1 Tab by mouth daily. VITALS:   Patient Vitals for the past 8 hrs:   BP Temp Pulse Resp SpO2 Height Weight   21 2308 (!) 154/101 98.4 °F (36.9 °C) 95 20 95 % 5' 5\" (1.651 m) 265 lb (120.2 kg)    , Temp (24hrs), Av.4 °F (36.9 °C), Min:98.4 °F (36.9 °C), Max:98.4 °F (36.9 °C)         X-ray: left distal displaced femur fracture.     Diagnosis   Patient Active Problem List   Diagnosis Code    Hypokalemia E87.6    HTN (hypertension), malignant I10    Arthritis M19.90    Morbidly obese ([address] E66.01    Adrenal adenoma, right D35.01    SIRS (systemic inflammatory response syndrome) ([address] R65.10    Constipation K59.00    Nausea and vomiting R11.2    Osteomyelitis of spine ([facility]) M46.20    Femur fracture, left ([facility]) S72. 92XA          Assessment and Plan:   Patient has acute left displaced distal femur fracture. Will need immediate surgical intervention. X-rays and patient's history discussed with Dr. Rachael Worthington. X-rays were sent via [place]. Again patient was not examined, information was obtained from emergency room physician. We will plan for admission. N.p.o. after midnight. Dr. Zain Burciaga will consult and see the patient in the morning.         Gilford Caroli, NP    4/6/2021,  12:38 AM

## 2021-04-06 NOTE — ED NOTES
TRANSFER - OUT REPORT:    Verbal report given to PARDEEP Caballero on Vidhya Moore  being transferred to [facility] for routine progression of care       Report consisted of patients Situation, Background, Assessment and   Recommendations(SBAR). Information from the following report(s) SBAR, Kardex, ED Summary and Recent Results was reviewed with the receiving nurse. Lines:   Peripheral IV 04/06/21 Left Hand (Active)       Peripheral IV 04/06/21 Right Antecubital (Active)        Opportunity for questions and clarification was provided.       Patient transported with:   [employer]

## 2021-04-06 NOTE — PROGRESS NOTES
04/06/21 1558   Dual Skin Pressure Injury Assessment   Dual Skin Pressure Injury Assessment WDL   Second Care Provider (Based on [address] [place] [address]) Antwon Vega, RN   Skin Integumentary   Skin Integumentary (WDL) X    Pressure  Injury Documentation No Pressure Injury Noted-Pressure Ulcer Prevention Initiated   Skin Color Appropriate for ethnicity   Skin Condition/Temp Dry; Warm   Skin Integrity Incision (comment)  (left femur surgery)   Turgor Non-tenting

## 2021-04-06 NOTE — ANESTHESIA PREPROCEDURE EVALUATION
Relevant Problems   No relevant active problems       Anesthetic History     PONV          Review of Systems / Medical History  Patient summary reviewed and pertinent labs reviewed    Pulmonary          Smoker         Neuro/Psych   Within defined limits           Cardiovascular    Hypertension: well controlled              Exercise tolerance: >4 METS     GI/Hepatic/Renal                Endo/Other    Diabetes (\"Borderline DM\"): type 2    Morbid obesity and arthritis     Other Findings              Physical Exam    Airway  Mallampati: II  TM Distance: 4 - 6 cm  Neck ROM: normal range of motion   Mouth opening: Normal     Cardiovascular    Rhythm: regular  Rate: normal         Dental    Dentition: Upper partial plate     Pulmonary  Breath sounds clear to auscultation               Abdominal  GI exam deferred       Other Findings            Anesthetic Plan    ASA: 3  Patient did not consent to regional anesthesiaAnesthesia type: general          Induction: Intravenous  Anesthetic plan and risks discussed with: Patient      Patient refused spinal

## 2021-04-06 NOTE — PERIOP NOTES
TRANSFER - IN REPORT:    Verbal report received from PARDEEP Chapa(name) on Hemal Poser  being received from Rm 722 (unit) for ordered procedure      Report consisted of patients Situation, Background, Assessment and   Recommendations(SBAR). Information from the following report(s) SBAR, Recent Results, Pre Procedure Checklist and Procedure Verification was reviewed with the receiving nurse. Opportunity for questions and clarification was provided. Assessment completed upon patients arrival to unit and care assumed.

## 2021-04-06 NOTE — H&P
History and physical    Patient: Saúl Saravia MRN: [id number]  SSN: [id number]    YOB: 1960  Age: 64 y.o. Sex: female      Subjective:      Saúl Saravia is a 64 y.o. female who was at work and basically lost her balance and fell from a standing height injuring her left lower extremity. She was seen emergency room and found to have a left distal femoral shaft fracture. She denies any other problems besides just some problems she has had in general which includes some sciatic type symptoms on her right lower extremity. She does walk with a walker after a recent hospitalization for some infection and what sounds like some spine issues. She has had a long history of knee arthritis in both knees. .    Past Medical History:   Diagnosis Date    Adrenal adenoma, right 7/20/2020    Arthritis 7/20/2020    Hypertension     Nausea and vomiting 7/22/2020    Sepsis due to methicillin susceptible Staphylococcus aureus (MSSA) without acute organ dysfunction ([facility] [address] 7/22/2020     Past Surgical History:   Procedure Laterality Date    HX ORTHOPAEDIC        FAMHX -No history of inflammatory arthritis   Social History     Tobacco Use    Smoking status: Never Smoker   Substance Use Topics    Alcohol use: No      Current Facility-Administered Medications   Medication Dose Route Frequency Provider Last Rate Last Admin    sodium chloride (NS) flush 5-40 mL  5-40 mL IntraVENous Q8H Sridhar Mccain NP   5 mL at 04/06/21 0214    sodium chloride (NS) flush 5-40 mL  5-40 mL IntraVENous PRN Dwayne Mccain NP        acetaminophen (TYLENOL) tablet 650 mg  650 mg Oral Q6H PRN Dwayne Mccain NP        Or    acetaminophen (TYLENOL) suppository 650 mg  650 mg Rectal Q6H PRN Dwayne Mccain NP        polyethylene glycol (MIRALAX) packet 17 g  17 g Oral DAILY PRN Dwayne Mccain NP        ondansetron (ZOFRAN) injection 4 mg  4 mg IntraVENous Q6H PRN Dwayne Mccain NP        HYDROmorphone (DILAUDID) injection 1 mg  1 mg IntraVENous Q4H PRN Saúl Mccain NP        oxyCODONE IR (ROXICODONE) tablet 5-10 mg  5-10 mg Oral Q4H PRN Sridhar Mccain NP   10 mg at 04/06/21 0530    lisinopril-hydroCHLOROthiazide (PRINZIDE, ZESTORETIC) 10-12.5 mg per tablet 2 Tab  2 Tab Oral DAILY Saúl Mccain NP            Allergies   Allergen Reactions    Lortab [Hydrocodone-Acetaminophen] Nausea and Vomiting       Review of Systems:  A comprehensive review of systems was negative except for that written in the History of Present Illness. Objective:     Vitals:    04/06/21 0141 04/06/21 0210 04/06/21 0220 04/06/21 0527   BP: 126/87 132/80 135/87 115/69   Pulse: 96 85 (!) 105 98   Resp:       Temp:       SpO2: 90% 91%  94%   Weight:       Height:            Physical Exam:  Physical Exam:  General:  Alert, cooperative, no distress, appears stated age. Orientation alert and oriented person place time and situation   Eyes:  Conjunctivae/corneas clear. PERRL, EOMs intact. Fundi benign   Ears:  Normal TMs and external ear canals both ears. Nose: Nares normal. Septum midline. Mucosa normal. No drainage or sinus tenderness. Mouth/Throat: Lips, mucosa, and tongue normal. Teeth and gums normal.   Neck: Supple, symmetrical, trachea midline, no adenopathy, thyroid: no enlargment/tenderness/nodules, no carotid bruit and no JVD. Back:   Symmetric, no curvature. ROM normal. No CVA tenderness. Lungs:   Clear to auscultation bilaterally. Heart:  Regular rate and rhythm, S1, S2 normal, no murmur, click, rub or gallop. Abdomen:   Soft, non-tender. Bowel sounds normal. No masses,  No organomegaly. No lymphadenopathy in all 4 extremities  Alignmentshe has obvious deformity of her left knee  Range of motionpain with any range of motion left knee  Vasculardistal pulses palpable in lower extremity  Sensory/motordeep tendon reflexes normal left lower extremity.   Motor and sensory function intact. Rianna has obvious instability at the fracture site of her left knee  Tenderness to palpation throughout the left knee and distal femur area  Skinno open wounds left lower extremity  Gait-I have not asked her to walk because she has a known femur fracture    Assessment:     Hospital Problems  Never Reviewed          Codes Class Noted POA    Femur fracture, left ([facility] [address] ICD-10-CM: I06.35YY  ICD-9-CM: 821.00  4/6/2021 Unknown                Xrays and or studies:    AP and lateral views of the left knee shows a left distal femoral shaft fracture at the junction of the diaphysis and metaphysis distally.   She does have rather significant advanced degenerative changes in her left knee  Plan:     The plan will be to leave her n.p.o. and hopefully we can treat her with retrograde intramedullary nail fixation of her left distal femoral shaft fracture today in the operating room    Signed By: Trudi Garcia MD     April 6, 2021

## 2021-04-06 NOTE — ANESTHESIA POSTPROCEDURE EVALUATION
Procedure(s): FEMUR INSERTION RETROGRADE  NAIL LEFT [place]. general    Anesthesia Post Evaluation      Multimodal analgesia: multimodal analgesia used between 6 hours prior to anesthesia start to PACU discharge  Patient location during evaluation: PACU  Patient participation: complete - patient participated  Level of consciousness: awake and alert  Pain management: adequate  Airway patency: patent  Anesthetic complications: no  Cardiovascular status: acceptable  Respiratory status: acceptable  Hydration status: acceptable  Post anesthesia nausea and vomiting:  controlled (Mild nausea after narcotic dose, now better after promethazine dose)  Final Post Anesthesia Temperature Assessment:  Normothermia (36.0-37.5 degrees C)      INITIAL Post-op Vital signs:   Vitals Value Taken Time   /77 04/06/21 1430   Temp 36.4 °C (97.5 °F) 04/06/21 1340   Pulse 77 04/06/21 1430   Resp 18 04/06/21 1400   SpO2 98 % 04/06/21 1430   Vitals shown include unvalidated device data.

## 2021-04-06 NOTE — PERIOP NOTES
TRANSFER - OUT REPORT:    Verbal report given to Lv Cat RN on Ana Mccann being transferred to room [id number] for routine post - op. Report consisted of patients Situation, Background, Assessment and Recommendations (SBAR). Information from the following report(s) SBAR, OR Summary, Procedure Summary, Intake/Output and MAR was reviewed with the receiving nurse. Lines:   Peripheral IV 04/06/21 Left Hand (Active)   Site Assessment Clean, dry, & intact 04/06/21 1430   Phlebitis Assessment 0 04/06/21 1430   Infiltration Assessment 0 04/06/21 1430   Dressing Status Clean, dry, & intact; Occlusive 04/06/21 1430   Dressing Type Tape;Transparent 04/06/21 1430   Hub Color/Line Status Pink; Infusing;Patent; Positional 04/06/21 1430   Alcohol Cap Used No 04/06/21 1430       Peripheral IV 04/06/21 Right Antecubital (Active)   Site Assessment Clean, dry, & intact 04/06/21 1430   Phlebitis Assessment 0 04/06/21 1430   Infiltration Assessment 0 04/06/21 1430   Dressing Status Clean, dry, & intact; Occlusive 04/06/21 1430   Dressing Type Tape;Transparent 04/06/21 1430   Hub Color/Line Status Pink;Capped; Patent; Positional 04/06/21 1430   Alcohol Cap Used No 04/06/21 1430        Opportunity for questions and clarification was provided. Patient transported with:   O2 @ 4 liters    VTE prophylaxis orders have been written for Ana Mccann. Patient and family given floor number and nurses name. Family updated re: pt status after security code verified.

## 2021-04-06 NOTE — PROGRESS NOTES
TRANSFER - IN REPORT:    Verbal report received from [place], [facility] on Emma Shaw  being received from PACU for routine post - op      Report consisted of patients Situation, Background, Assessment and   Recommendations(SBAR). Information from the following report(s) SBAR was reviewed with the receiving nurse. Opportunity for questions and clarification was provided.

## 2021-04-06 NOTE — PROGRESS NOTES
TRANSFER - IN REPORT:    Verbal report received from Adeel Sweeney. on Mikayla Tobar  being received from ED for routine progression of care      Report consisted of patients Situation, Background, Assessment and   Recommendations(SBAR). Information from the following report(s) SBAR, Kardex, ED Summary, [facility] and Recent Results was reviewed with the receiving nurse. Opportunity for questions and clarification was provided. Assessment completed upon patients arrival to unit and care assumed.

## 2021-04-06 NOTE — ED PROVIDER NOTES
[address] ClayBerman [address] Mikayla Tobar is a 64 y.o. female seen on 4/6/2021 in the [facility] EMERGENCY DEPT in room ERB/B. Chief Complaint   Patient presents with   [address] [facility]     HPI: 82-year-old female presented to the emergency department with complaints of left thigh pain and knee pain after having a fall while at work earlier today. Patient states that she usually uses a rolling walker for balance as she is chronically unstable. Patient had gone into the bathroom at work and could not get the walker into the bathroom so she left outside the door. Patient states that she got up to walk out of the bathroom and as she was going through the door lost her balance and caused her legs to cross and she fell on her left side. Patient with immediate left leg pain. She was unable to ambulate after her fall. Patient with no other injuries. She did not hit her head. She has not on blood thinners. Historian: Patient    REVIEW OF SYSTEMS     Review of Systems   Constitutional: Negative. HENT: Negative. Respiratory: Negative. Cardiovascular: Negative. Gastrointestinal: Negative. Musculoskeletal: Positive for back pain (Chronic) and gait problem. Left leg pain/injury   Skin: Negative. Hematological: Negative. Psychiatric/Behavioral: Negative. All other systems reviewed and are negative.       PAST MEDICAL HISTORY     Past Medical History:   Diagnosis Date    Adrenal adenoma, right 7/20/2020    Arthritis 7/20/2020    Hypertension     Nausea and vomiting 7/22/2020    Sepsis due to methicillin susceptible Staphylococcus aureus (MSSA) without acute organ dysfunction ([facility] [address] 7/22/2020     Past Surgical History:   Procedure Laterality Date    HX ORTHOPAEDIC       Social History     Socioeconomic History    Marital status: SINGLE     Spouse name: Not on file    Number of children: Not on file    Years of education: Not on file    Highest education level: Not on file   Tobacco Use    Smoking status: Never Smoker   Substance and Sexual Activity    Alcohol use: No    Drug use: No     Cannot display prior to admission medications because the patient has not been admitted in this contact. Allergies   Allergen Reactions    Lortab [Hydrocodone-Acetaminophen] Nausea and Vomiting        PHYSICAL EXAM       Vitals:    04/06/21 0126 04/06/21 0141 04/06/21 0210 04/06/21 0220   BP: (!) 161/80 126/87 132/80 135/87   Pulse: 76 96 85 (!) 105   Resp:       Temp:       SpO2: 92% 90% 91% (!) 69%    Vital signs were reviewed. Physical Exam  Vitals signs and nursing note reviewed. Constitutional:       General: She is in acute distress (Obvious discomfort). HENT:      Head: Normocephalic and atraumatic. Mouth/Throat:      Mouth: Mucous membranes are moist.   Eyes:      Extraocular Movements: Extraocular movements intact. Pupils: Pupils are equal, round, and reactive to light. Neck:      Musculoskeletal: Normal range of motion. Cardiovascular:      Rate and Rhythm: Normal rate and regular rhythm. Pulses: Normal pulses. Pulmonary:      Effort: Pulmonary effort is normal.      Breath sounds: Normal breath sounds. Abdominal:      Palpations: Abdomen is soft. Tenderness: There is no abdominal tenderness. Musculoskeletal:         General: Swelling, tenderness and deformity present. Comments: Significant swelling and tenderness to the distal left femur with crepitance. No injury below left knee   Skin:     General: Skin is warm and dry. Neurological:      General: No focal deficit present. Mental Status: She is alert and oriented to person, place, and time. Psychiatric:         Mood and Affect: Mood normal.         Behavior: Behavior normal.         Thought Content:  Thought content normal.         Judgment: Judgment normal.          MEDICAL DECISION MAKING     ED Course:    Recent Results (from the past 8 hour(s))   CBC WITH AUTOMATED DIFF Collection Time: 04/06/21 12:26 AM   Result Value Ref Range    WBC 19.6 (H) 4.3 - 11.1 K/uL    RBC 4.50 4.05 - 5.2 M/uL    HGB 12.7 11.7 - 15.4 g/dL    HCT 41.1 35.8 - 46.3 %    MCV 91.3 79.6 - 97.8 FL    MCH 28.2 26.1 - 32.9 PG    MCHC 30.9 (L) 31.4 - 35.0 g/dL    RDW 14.4 11.9 - 14.6 %    PLATELET 217 472 - 421 K/uL    MPV 12.0 9.4 - 12.3 FL    ABSOLUTE NRBC 0.00 0.0 - 0.2 K/uL    DF AUTOMATED      NEUTROPHILS 88 (H) 43 - 78 %    LYMPHOCYTES 8 (L) 13 - 44 %    MONOCYTES 3 (L) 4.0 - 12.0 %    EOSINOPHILS 0 (L) 0.5 - 7.8 %    BASOPHILS 0 0.0 - 2.0 %    IMMATURE GRANULOCYTES 1 0.0 - 5.0 %    ABS. NEUTROPHILS 17.2 (H) 1.7 - 8.2 K/UL    ABS. LYMPHOCYTES 1.5 0.5 - 4.6 K/UL    ABS. MONOCYTES 0.6 0.1 - 1.3 K/UL    ABS. EOSINOPHILS 0.1 0.0 - 0.8 K/UL    ABS. BASOPHILS 0.1 0.0 - 0.2 K/UL    ABS. IMM. GRANS. 0.2 0.0 - 0.5 K/UL   TYPE & SCREEN    Collection Time: 04/06/21 12:26 AM   Result Value Ref Range    Crossmatch Expiration 04/09/2021,2359     ABO/Rh(D) A POSITIVE     Antibody screen NEG    METABOLIC PANEL, COMPREHENSIVE    Collection Time: 04/06/21 12:26 AM   Result Value Ref Range    Sodium 139 136 - 145 mmol/L    Potassium 3.5 3.5 - 5.1 mmol/L    Chloride 103 98 - 107 mmol/L    CO2 30 21 - 32 mmol/L    Anion gap 6 (L) 7 - 16 mmol/L    Glucose 135 (H) 65 - 100 mg/dL    BUN 20 6 - 23 MG/DL    Creatinine 0.70 0.6 - 1.0 MG/DL    GFR est AA >60 >60 ml/min/1.73m2    GFR est non-AA >60 >60 ml/min/1.73m2    Calcium 9.3 8.3 - 10.4 MG/DL    Bilirubin, total 0.4 0.2 - 1.1 MG/DL    ALT (SGPT) 22 12 - 65 U/L    AST (SGOT) 13 (L) 15 - 37 U/L    Alk.  phosphatase 136 50 - 136 U/L    Protein, total 7.2 6.3 - 8.2 g/dL    Albumin 3.2 (L) 3.5 - 5.0 g/dL    Globulin 4.0 (H) 2.3 - 3.5 g/dL    A-G Ratio 0.8 (L) 1.2 - 3.5     MAGNESIUM    Collection Time: 04/06/21 12:26 AM   Result Value Ref Range    Magnesium 2.2 1.8 - 2.4 mg/dL   PROTHROMBIN TIME + INR    Collection Time: 04/06/21 12:26 AM   Result Value Ref Range    Prothrombin time 13.0 12.5 - 14.7 sec    INR 1.0     URINALYSIS W/ RFLX MICROSCOPIC    Collection Time: 04/06/21 12:48 AM   Result Value Ref Range    Color YELLOW      Appearance CLEAR      Specific gravity 1.025 (H) 1.001 - 1.023      pH (UA) 6.5 5.0 - 9.0      Protein Negative NEG mg/dL    Glucose Negative mg/dL    Ketone Negative NEG mg/dL    Bilirubin Negative NEG      Blood Negative NEG      Urobilinogen 0.2 0.2 - 1.0 EU/dL    Nitrites Negative NEG      Leukocyte Esterase Negative NEG       Xr Femur Lt 2 V    Result Date: 4/6/2021  EXAMINATION: XR FEMUR LT 2 V HISTORY: fall. TECHNIQUE: Frontal and lateral views of the left femur. COMPARISON: None available. FINDINGS: There is a comminuted fracture of the distal femoral diaphysis. There is ventral angulation of the proximal fragment. The visualized knee joint is intact. The hip is intact. There is a small joint effusion. No radiopaque foreign body. Comminuted fracture of the distal femoral diaphysis as described above. Xr Chest Port    Result Date: 4/6/2021  EXAM: XR CHEST PORT HISTORY: fall. TECHNIQUE: A single frontal view of the chest was submitted. COMPARISON: 7/20/2020 FINDINGS: The cardiac silhouette, mediastinum, and pulmonary vasculature are within normal limits. There is no consolidation, pleural effusion, or pneumothorax. No significant osseous abnormalities are observed. Healed right rib fracture, unchanged     No evidence of an acute intrathoracic process. Xr Knee Lt 3 V    Result Date: 4/6/2021  EXAMINATION: XR KNEE LT 3 V HISTORY: fall. TECHNIQUE: Frontal, lateral, and oblique views of the left knee. COMPARISON: None available. FINDINGS: There is a comminuted fracture of the distal femoral diaphysis. The proximal fragment is ventrally angulated. The knee joint is intact. There is a small joint effusion. There is significant narrowing of the medial compartment. There is bony spurring of the medial and lateral tibial plateau.  The patella appears to be intact. No radiopaque foreign body. Fracture of the distal femur. The knee joint is intact. Significant degenerative changes are noted. EKG: normal sinus rhythm, nonspecific ST and T waves changes, incomplete RBBB, Rate 89. MDM  Number of Diagnoses or Management Options  Closed displaced fracture of distal epiphysis of left femur, initial encounter ([facility]  Fall, initial encounter  Diagnosis management comments: 62-year-old female present emergency department with left femur fracture after same level fall. Patient placed in knee immobilizer for support and comfort. Discussed with orthopedics and they will admit patient with anticipated surgery in the morning. Amount and/or Complexity of Data Reviewed  Clinical lab tests: ordered and reviewed  Tests in the radiology section of CPT®: ordered and reviewed  Discuss the patient with other providers: yes  Independent visualization of images, tracings, or specimens: yes    Patient Progress  Patient progress: stable        Disposition: Admitted  Diagnosis:     ICD-10-CM ICD-9-CM   1. Fall, initial encounter  [address] XXXA E888.9   2. Closed displaced fracture of distal epiphysis of left femur, initial encounter ([facility]  S72.442A 821.22     ____________________________________________________________________  A portion of this note was generated using voice recognition dictation software. While the note has been reviewed for accuracy, please note certain words and phrases may not be transcribed as intended and some grammatical and/or typographical errors may be present.

## 2021-04-06 NOTE — PROGRESS NOTES
CM screened chart for potential discharge needs. No CM consult received. Patient admitted with left femur fracture with planned surgery today. Patient has previously been followed by CM as she was here in 2020 for osteomyelitis with needs for home IV antibiotics. Patient is self-pay and has submitted financial aide application back on 5/08/2475. Patient has been seen by [facility] and CM anticipates that patient will discharge home with [facility] PT/OT/RN and family/friend support once medically ready. Patient has rolling walker. CM will continue to follow.      Care Management Interventions  PCP Verified by CM: Yes([place])  Discharge Location  Discharge Placement: Unable to determine at this time

## 2021-04-07 LAB
GLUCOSE BLD STRIP.AUTO-MCNC: 100 MG/DL (ref 65–100)
GLUCOSE BLD STRIP.AUTO-MCNC: 115 MG/DL (ref 65–100)
GLUCOSE BLD STRIP.AUTO-MCNC: 121 MG/DL (ref 65–100)
GLUCOSE BLD STRIP.AUTO-MCNC: 122 MG/DL (ref 65–100)
HCT VFR BLD AUTO: 32.1 % (ref 35.8–46.3)
HGB BLD-MCNC: 10.1 G/DL (ref 11.7–15.4)
MM INDURATION POC: 0 MM (ref 0–5)
PPD POC: NEGATIVE NEGATIVE
SERVICE CMNT-IMP: ABNORMAL
SERVICE CMNT-IMP: NORMAL

## 2021-04-07 PROCEDURE — 74011250637 HC RX REV CODE- 250/637: Performed by: ORTHOPAEDIC SURGERY

## 2021-04-07 PROCEDURE — 65270000029 HC RM PRIVATE

## 2021-04-07 PROCEDURE — 82962 GLUCOSE BLOOD TEST: CPT

## 2021-04-07 PROCEDURE — 74011000258 HC RX REV CODE- 258: Performed by: ORTHOPAEDIC SURGERY

## 2021-04-07 PROCEDURE — 97530 THERAPEUTIC ACTIVITIES: CPT

## 2021-04-07 PROCEDURE — 85018 HEMOGLOBIN: CPT

## 2021-04-07 PROCEDURE — 74011250636 HC RX REV CODE- 250/636: Performed by: ORTHOPAEDIC SURGERY

## 2021-04-07 PROCEDURE — 2709999900 HC NON-CHARGEABLE SUPPLY

## 2021-04-07 PROCEDURE — 97116 GAIT TRAINING THERAPY: CPT

## 2021-04-07 PROCEDURE — 36415 COLL VENOUS BLD VENIPUNCTURE: CPT

## 2021-04-07 PROCEDURE — 97166 OT EVAL MOD COMPLEX 45 MIN: CPT

## 2021-04-07 PROCEDURE — 97162 PT EVAL MOD COMPLEX 30 MIN: CPT

## 2021-04-07 PROCEDURE — 97112 NEUROMUSCULAR REEDUCATION: CPT

## 2021-04-07 RX ADMIN — Medication 5 ML: at 17:41

## 2021-04-07 RX ADMIN — OXYCODONE 5 MG: 5 TABLET ORAL at 17:40

## 2021-04-07 RX ADMIN — LISINOPRIL AND HYDROCHLOROTHIAZIDE 2 TABLET: 12.5; 1 TABLET ORAL at 08:19

## 2021-04-07 RX ADMIN — Medication 5 ML: at 05:15

## 2021-04-07 RX ADMIN — OXYCODONE 5 MG: 5 TABLET ORAL at 12:38

## 2021-04-07 RX ADMIN — CEFAZOLIN SODIUM 1 G: 1 INJECTION, POWDER, FOR SOLUTION INTRAMUSCULAR; INTRAVENOUS at 05:14

## 2021-04-07 RX ADMIN — Medication 10 ML: at 17:41

## 2021-04-07 RX ADMIN — ASPIRIN 325 MG: 325 TABLET, COATED ORAL at 08:18

## 2021-04-07 RX ADMIN — OXYCODONE 10 MG: 5 TABLET ORAL at 05:14

## 2021-04-07 RX ADMIN — Medication 5 ML: at 21:24

## 2021-04-07 NOTE — PROGRESS NOTES
Progress Note    Patient: Amil Burkitt MRN: [id number]  SSN: [id number]    YOB: 1960  Age: 64 y.o. Sex: female      Admit Date: 4/5/2021    LOS: 1 day     Subjective:     Patient reports her pain seems to be pretty well controlled with her current pain medication    Objective:     Vitals:    04/06/21 2144 04/07/21 0014 04/07/21 0503 04/07/21 0732   BP:  (!) 96/53 110/67 (!) 107/58   Pulse:  92 90 (!) 113   Resp:  20 20 12   Temp:  98.4 °F (36.9 °C) 98 °F (36.7 °C) 98.9 °F (37.2 °C)   SpO2: 93% 96% 97% 96%   Weight:       Height:            Intake and Output:  Current Shift: No intake/output data recorded. Last three shifts: 04/05 1901 - 04/07 0700  In: 1800 [I.V.:1800]  Out: 350 [Urine:300]    alert and oriented to person place time and situation  Skin - dressing clean dry and intact  Motor and sensory function intact in LEFT LOWER extremity  Pulses palpable in LEFT LOWER extremity       Lab/Data Review:  CBC:   Lab Results   Component Value Date/Time    HGB 10.1 (L) 04/07/2021 04:28 AM    HCT 32.1 (L) 04/07/2021 04:28 AM          Assessment:     Acute postop blood loss anemia with hemoglobin stable at 10.1, POD #1 from retrograde intramedullary nail fixation of left distal femur fracture    Plan:     Patient may get up with physical therapy today. She can be weightbearing as tolerated for transfers only on her left lower extremity she may activity as tolerated right lower extremity.   We will also begin discharge planning    Signed By: Oscar Jung MD     April 7, 2021

## 2021-04-07 NOTE — PROGRESS NOTES
ACUTE OT GOALS:  (Developed with and agreed upon by patient and/or caregiver.)  1. Patient will complete lower body bathing and dressing with Min A and adaptive equipment as needed. 2. Patient will complete toileting with Min A and adaptive equipment as needed. 3. Patient will tolerate 23 minutes of OT treatment with 1-2 rest breaks to increase activity tolerance for performance of ADLs. 4. Patient will complete functional transfers from sit to stand with CGA and adaptive equipment as needed. 5. Patient will complete SPT from bed to chair with Min A and adaptive equipment as needed. 6. Patient will complete BUE exercises to increase strength in RUE to at least a 4/5 for performance of ADLs and functional transfers. Timeframe: 7 visits     OCCUPATIONAL THERAPY ASSESSMENT: Initial Assessment, Daily Note and AM OT Treatment Day # 1   WBAT for TRANSFERS ONLY MAURY Jones is a 64 y.o. female   PRIMARY DIAGNOSIS: <principal problem not specified>  Femur fracture, left ([facility]) [S72.92XA]  Closed fracture of left distal femur ([facility]) [S72.402A]  Procedure(s) (LRB):  FEMUR INSERTION RETROGRADE  NAIL LEFT Naseem (Left)  1 Day Post-Op  Reason for Referral:  Post-operative mobilization  ICD-10: Treatment Diagnosis: Generalized Muscle Weakness (M62.81)  Other lack of cordination (R27.8)  Difficulty in walking, Not elsewhere classified (R26.2)  INPATIENT: Payor: /   ASSESSMENT:     REHAB RECOMMENDATIONS:   Recommendation to date pending progress:  Setting:   Short-term Rehab  Equipment:    To Be Determined     PRIOR LEVEL OF FUNCTION:  (Prior to Hospitalization)  INITIAL/CURRENT LEVEL OF FUNCTION:  (Based on today's evaluation)   Bathing:   Modified Independent  Dressing:   Modified Independent  Feeding/Grooming:   Modified Independent in standing with use of RW. Toileting:   Modified Independent  Functional Mobility:   Modified Independent with use of RW or cane.   Bathing:   Moderate Assistance  Dressing:   Maximal Assistance  Feeding/Grooming:   Set Up in seated. Toileting:   Total Assistance  Functional Mobility:   Moderate Assistance x 2 with use of RW.     ASSESSMENT:  Ms. Lashonda Zarate was admitted following fall at work and found to have L hip fracture and is s/p L retrograde nailing and in WBAT for TRANSFERS ONLY in LLE. Pt presents with overall deficits in strength, ROM, and activity tolerance for performance of ADLs and functional mobility. Pt requires Min A to complete bed mobility. Pt requires Total A for sock management. Pt requires Mod A x 2 with use of RW to complete sit <> stand transfer and SPT from bed to chair. Upon standing, pt presents with slumped posture and increased anterior lean; pt provided with moderate verbal, visual, tactile, and manual cueing to hold head upright and bring pelvis forward to improve standing posture and balance. Pt also provided with cueing to push through BUE and weight bear mostly through RLE to improve adherence to WBAT in LLE. Pt initially on 2L O2. O2 doffed and pt observed to desaturate to 86% seated edge of bed; able to improve to 92% with use of pursed lip breathing and increased rest break. O2 sats 94% following transfer to chair. Pt would benefit from continued skilled OT to increase independence and safety for performance of ADLs and functional mobility. At this time, patient is appropriate for Co-treatment with physical therapy due to patient's decreased overall endurance/tolerance levels, as well as need for high level skilled assistance to complete functional transfers/mobility and functional tasks. Kim Robertson is appropriate for a multidisciplinary co-treatment of PT and OT to address goals of both disciplines. SUBJECTIVE:   Ms. Lashonda Zarate states, \"My sons work second shift. \"    SOCIAL HISTORY/LIVING ENVIRONMENT: Pt lives with two sons (both working second shift) in one level home with 3 YUNG.  Pt is typically Mod I for ADLs and Mod I for functional mobility with use of RW or SPC. Pt works full-time at [employer] and reports use of RW or cane throughout the day. Pt drives. OBJECTIVE:     PAIN: VITAL SIGNS: LINES/DRAINS:   Pre Treatment: Pain Screen  Pain Scale 1: Numeric (0 - 10)  Pain Intensity 1: 0  Post Treatment: Unchanged   Gama Catheter  O2 Device: Nasal cannula     GROSS EVALUATION:  BUE Within Functional Limits Abnormal/ Functional Abnormal/ Non-Functional (see comments) Not Tested Comments:   AROM [x] [] [] []    PROM [] [] [] [x]    Strength [] [x] [] [] 3/5 RUE; per pt, decreased strength in RUE due to poor PICC placement on last admission. Balance [] [x] [] []    Posture [] [x] [] []    Sensation [x] [] [] []    Coordination [] [x] [] []    Tone [] [] [] [x]    Edema [] [] [] [x]    Activity Tolerance [] [x] [] []     [] [] [] []      COGNITION/  PERCEPTION: Intact Impaired   (see comments) Comments:   Orientation [x] []    Vision [x] []    Hearing [x] []    Judgment/ Insight [x] []    Attention [x] []    Memory [x] []    Command Following [] [x] Requires additional cueing for safe transfer technique. Emotional Regulation [x] []     [] []      ACTIVITIES OF DAILY LIVING: I Mod I S SBA CGA Min Mod Max Total NT Comments   BASIC ADLs:              Bathing/ Showering [] [] [] [] [] [] [] [] [] [x]    Toileting [] [] [] [] [] [] [] [] [] [x]    Dressing [] [] [] [] [] [] [] [] [x] [] Total A sock management. Feeding [] [] [] [] [] [] [] [] [] []    Grooming [] [] [] [] [] [] [] [] [] [x]    Personal Device Care [] [] [] [] [] [] [] [] [] [x]    Functional Mobility [] [] [] [] [] [] [x] [] [] [] Assist x 2 to transfer to chair.    I=Independent, Mod I=Modified Independent, S=Supervision, SBA=Standby Assistance, CGA=Contact Guard Assistance,   Min=Minimal Assistance, Mod=Moderate Assistance, Max=Maximal Assistance, Total=Total Assistance, NT=Not Tested    MOBILITY: I Mod I S SBA CGA Min Mod Max Total  NT x2 Comments:   Supine to sit [] [] [] [] [] [x] [] [] [] [] []    Sit to supine [] [] [] [] [] [] [] [] [] [x] []    Sit to stand [] [] [] [] [] [] [x] [] [] [] [x] Assist x 2   Bed to chair [] [] [] [] [] [] [x] [] [] [] [x] SPT with RW. I=Independent, Mod I=Modified Independent, S=Supervision, SBA=Standby Assistance, CGA=Contact Guard Assistance,   Min=Minimal Assistance, Mod=Moderate Assistance, Max=Maximal Assistance, Total=Total Assistance, NT=Not [place] [address]   Daily Activity Inpatient Short Form        How much help from another person does the patient currently need. .. Total A Lot A Little None   1. Putting on and taking off regular lower body clothing? [x] 1   [] 2   [] 3   [] 4   2. Bathing (including washing, rinsing, drying)? [] 1   [x] 2   [] 3   [] 4   3. Toileting, which includes using toilet, bedpan or urinal?   [x] 1   [] 2   [] 3   [] 4   4. Putting on and taking off regular upper body clothing? [] 1   [x] 2   [] 3   [] 4   5. Taking care of personal grooming such as brushing teeth? [] 1   [] 2   [x] 3   [] 4   6. Eating meals? [] 1   [] 2   [x] 3   [] 4   © 2007, Trustees of [address] [place] [address] [address] [address], under license to [employer]. All rights reserved     Score:  Initial: 12, completed, 4/7/2021 Most Recent: X (Date: -- )   Interpretation of Tool:  Represents activities that are increasingly more difficult (i.e. Bed mobility, Transfers, Gait). PLAN:   FREQUENCY/DURATION: OT Plan of Care: 6 times/week for duration of hospital stay or until stated goals are met, whichever comes first.    PROBLEM LIST:   (Skilled intervention is medically necessary to address:)  1. Decreased ADL/Functional Activities  2. Decreased Activity Tolerance  3. Decreased Balance  4. Decreased Coordination  5. Decreased Gait Ability  6. Decreased Strength  7. Decreased Transfer Abilities  8.  Increased Pain   INTERVENTIONS PLANNED:   (Benefits and precautions of occupational therapy have been discussed with the patient.)  1. Self Care Training  2. Therapeutic Activity  3. Therapeutic Exercise/HEP  4. Neuromuscular Re-education  5. Education     TREATMENT:     EVALUATION: Moderate Complexity : (Untimed Charge)    TREATMENT:   Neuromuscular Re-education (10 Minutes): Neuromuscular Re-education included Balance Training, Coordination training, Postural training, Sitting balance training and Standing balance training to improve Balance, Coordination, Functional Mobility and Postural Control. Today's treatment session addressed Decreased Strength, Decreased ADL/Functional Activities, Decreased Balance, Decreased Activity Tolerance, Increased Fatigue and Increased Shortness of Breath to progress towards achieving goal(s) listed above. During this session, Physical Therapy addressed  Functional Transfers and Gait to progress towards their discipline specific goal(s). Co-treatment was necessary to improve patient's ability to increase activity demands and ability to return to normal functional activity. TREATMENT GRID:  N/A    AFTER TREATMENT POSITION/PRECAUTIONS:  Alarm Activated, Chair, Needs within reach, RN notified and table tray anterior to pt.     INTERDISCIPLINARY COLLABORATION:  RN/PCT, PT/PTA and OT/COFFMAN    TOTAL TREATMENT DURATION:  OT Patient Time In/Time Out  Time In: 1020  Time Out: 1043    ADRIANA Silveira/AVILA

## 2021-04-07 NOTE — PROGRESS NOTES
Physical Therapy Note:    Attempted treatment this PM per BID plan of care. Patient states she is not ready for treatment yet, citing not wanting to be in pain and endorses comfort up in bedside chair. Says pain is \"controlled right now 3/10. \" Encouragement and education provided. Patient deferred until \"later. \" Told patient would check back with her later this PM as schedule permits or continue with [facility] in AM. Thank you,    Shayna Blevins, PT, DPT  4/7/2021

## 2021-04-07 NOTE — PROGRESS NOTES
ACUTE OT GOALS:  (Developed with and agreed upon by patient and/or caregiver.)  1. Patient will complete lower body bathing and dressing with Min A and adaptive equipment as needed. 2. Patient will complete toileting with Min A and adaptive equipment as needed. 3. Patient will tolerate 23 minutes of OT treatment with 1-2 rest breaks to increase activity tolerance for performance of ADLs. 4. Patient will complete functional transfers from sit to stand with CGA and adaptive equipment as needed. 5. Patient will complete SPT from bed to chair with Min A and adaptive equipment as needed. 6. Patient will complete BUE exercises to increase strength in RUE to at least a 4/5 for performance of ADLs and functional transfers.      Timeframe: 7 visits     OCCUPATIONAL THERAPY: Daily Note and PM OT Treatment Day # 1   WBAT for TRANSFERS ONLY LLE    Augusta Session is a 64 y.o. female   PRIMARY DIAGNOSIS: <principal problem not specified>  Femur fracture, left ([facility]) [S72.92XA]  Closed fracture of left distal femur ([facility]) [S72.402A]  Procedure(s) (LRB):  FEMUR INSERTION RETROGRADE  NAIL LEFT Naseem (Left)  1 Day Post-Op  Payor: /   ASSESSMENT:     REHAB RECOMMENDATIONS: CURRENT LEVEL OF FUNCTION:  (Most Recently Demonstrated)   Recommendation to date pending progress:  Setting:   Short-term Rehab  Equipment:    To Be Determined Bathing:   Not tested  Dressing:   Not tested  Feeding/Grooming:   Not tested  Toileting:   Not tested  Functional Mobility:   Moderate Assistance x 2 with use of RW.     ASSESSMENT:  Ms. Clay Lutz was seen this PM to assist with return to bed due to increased level of assist needed for safe transfer technique. Pt continues to present with deficits in strength, balance, and activity tolerance for performance of ADLs and functional mobility. Pt requires Mod A x 2 with use of RW to complete sit <> stand.  Upon standing, pt presents with slight improvement in static standing balance, but does require cueing to improve dynamic standing balance due to increased unsteadiness. Pt requires Mod A x 2 to complete SPT from chair to bed and complete two sidesteps to head of bed in preparation for return to seated edge of bed. Pt does present with increased independence for bed mobility this session and requires CGA to complete sit to supine. Pt would benefit from continued skilled OT to increase independence and safety for performance of ADLs and functional mobility. SUBJECTIVE:   Ms. Willard Narayanan states, \"I wasn't ready to get back to bed when she came by.\"    SOCIAL HISTORY/LIVING ENVIRONMENT: Pt lives with two sons (both working second shift) in one level home with 3 YUNG. Pt is typically Mod I for ADLs and Mod I for functional mobility with use of RW or SPC. Pt works full-time at [employer] and reports use of RW or cane throughout the day. Pt drives. OBJECTIVE:     PAIN: VITAL SIGNS: LINES/DRAINS:   Pre Treatment: Pain Screen  Pain Scale 1: Numeric (0 - 10)  Pain Intensity 1: 0(at rest)  Post Treatment: Unchanged   Gama Catheter  O2 Device: Room air     ACTIVITIES OF DAILY LIVING: I Mod I S SBA CGA Min Mod Max Total NT Comments   BASIC ADLs:              Bathing/ Showering [] [] [] [] [] [] [] [] [] [x]    Toileting [] [] [] [] [] [] [] [] [] [x]    Dressing [] [] [] [] [] [] [] [] [] [x]    Feeding [] [] [] [] [] [] [] [] [] [x]    Grooming [] [] [] [] [] [] [] [] [] [x]    Personal Device Care [] [] [] [] [] [] [] [] [] [x]    Functional Mobility [] [] [] [] [] [] [x] [] [] [] Assist x 2 SPT from chair to bed.     I=Independent, Mod I=Modified Independent, S=Supervision, SBA=Standby Assistance, CGA=Contact Guard Assistance,   Min=Minimal Assistance, Mod=Moderate Assistance, Max=Maximal Assistance, Total=Total Assistance, NT=Not Tested    MOBILITY: I Mod I S SBA CGA Min Mod Max Total  NT x2 Comments:   Supine to sit [] [] [] [] [] [] [] [] [] [x] []    Sit to supine [] [] [] [] [x] [] [] [] [] [] [] Additional time. Sit to stand [] [] [] [] [] [] [x] [] [] [] [x]    Bed to chair [] [] [] [] [] [] [] [] [] [x] [] Mod A x 2 with RW chair to bed. I=Independent, Mod I=Modified Independent, S=Supervision, SBA=Standby Assistance, CGA=Contact Guard Assistance,   Min=Minimal Assistance, Mod=Moderate Assistance, Max=Maximal Assistance, Total=Total Assistance, NT=Not Tested    BALANCE: Good Fair+ Fair Fair- Poor NT Comments   Sitting Static [] [x] [] [] [] []    Sitting Dynamic [] [] [x] [] [] []              Standing Static [] [] [] [x] [] []    Standing Dynamic [] [] [] [] [x] []      PLAN:   FREQUENCY/DURATION: OT Plan of Care: 6 times/week for duration of hospital stay or until stated goals are met, whichever comes first.    TREATMENT:   TREATMENT:   Therapeutic Activity (14 Minutes): Therapeutic activity included Sit to Supine, Scooting, Transfer Training, Ambulation on level ground, Sitting balance  and Standing balance to improve functional Mobility, Strength and Activity tolerance. TREATMENT GRID:  N/A    AFTER TREATMENT POSITION/PRECAUTIONS:  Alarm Activated, Bed, Needs within reach, RN notified and head of bed elevated.     INTERDISCIPLINARY COLLABORATION:  RN/PCT and OT/COFFMAN    TOTAL TREATMENT DURATION:  OT Patient Time In/Time Out  Time In: 1626  Time Out: 1640    ADRIANA Silveira/AVILA

## 2021-04-07 NOTE — PROGRESS NOTES
Spiritual Care visit. Initial Visit, Pre Surgery Consult. Visit and prayer before patient goes to surgery.     Visit by Cheryl Benson M.Ed., Th.B. ,Staff

## 2021-04-07 NOTE — PROGRESS NOTES
ACUTE PHYSICAL THERAPY GOALS:  (Developed with and agreed upon by patient and/or caregiver.)  ST. Patient will perform bed mobility with CONTACT GUARD ASSISTANCE within 3 days. 2. Patient will transfer bed to chair with MODERATE ASSISTANCE x1 within 3 days. 3. Patient will demonstrate FAIR STATIC STANDING balance within 3 day(s). 4. Patient will tolerate 15+ minutes of therapeutic activity/exercise and/or neuromuscular re-education while maintaining stable vitals to improve functional strength and activity tolerance within 3 days. LT. Patient will perform bed mobility with STAND BY ASSISTANCE within 7 days. 2. Patient will transfer bed to chair with MINIMAL ASSISTANCE x1 within 7 days. 3. Patient will demonstrate FAIR DYNAMIC STANDING balance within 7 day(s). .  4. Patient will tolerate 25+ minutes of therapeutic activity/exercise and/or neuromuscular re-education while maintaining stable vitals to improve functional strength and activity tolerance within 7 days.       PHYSICAL THERAPY ASSESSMENT: Initial Assessment, Daily Note and AM PT Treatment Day # 1     WBAT for transfers only      Ana Mccann is a 64 y.o. female   PRIMARY DIAGNOSIS: <principal problem not specified>  Femur fracture, left ([facility]) [S72.92XA]  Closed fracture of left distal femur ([facility]) [S72.402A]  Procedure(s) (LRB):  FEMUR INSERTION RETROGRADE  NAIL LEFT Naseem (Left)  1 Day Post-Op  Reason for Referral:    ICD-10: Treatment Diagnosis: Difficulty in walking, Not elsewhere classified (R26.2)  INPATIENT: Payor: /     ASSESSMENT:     REHAB RECOMMENDATIONS:   Recommendation to date pending progress:  Setting:   Short-term Rehab  Equipment:    To Be Determined     PRIOR LEVEL OF FUNCTION:  (Prior to Hospitalization) INITIAL/CURRENT LEVEL OF FUNCTION:  (Most Recently Demonstrated)   Bed Mobility:   Modified Independent  Sit to Stand:   Modified Independent  Transfers:   Modified Independent  Gait/Mobility:   Modified Independent Bed Mobility:   Minimal Assistance  Sit to Stand:   Moderate Assistance x 2  Transfers:   Moderate Assistance x 2  Gait/Mobility:   Moderate Assistance x 2     ASSESSMENT:  Ms. Yelena Davila is a 64year old female admitted s/p fall with resultant left femoral fracture. Patient is 1 day s/p left femoral IM nailing and is WBAT for transfers only. At baseline, patient is a modified independent, community-level ambulator with use of RW. Patient endorses balance impairment at baseline as well as decreased activity tolerance secondary to chronic low back and R LE pain. Today, presents with decreased functional strength and mobility from baseline. Mobilizing with moderate assistance x2 to chair. Required multiple cues for gait mechanics, assistive device utilization, and safety throughout transfer. Recommend STR at discharge. Will follow with BID plan of care. SUBJECTIVE:   Ms. Yelena Davila states, \"This is hard. \"    SOCIAL HISTORY/LIVING ENVIRONMENT: Lives with two adult sons (who work during the day)  In a single level residence with 3 steps and B handrail to enter. At baseline, patient is a modified independent  ambulator with use of RW. Patient endorses balance impairment at baseline as well as decreased functional   Activity tolerance secondary to chronic low back pain.       OBJECTIVE:     PAIN: VITAL SIGNS: LINES/DRAINS:   Pre Treatment: Pain Screen  Pain Scale 1: Numeric (0 - 10)  Pain Intensity 1: 0(at rest)  Post Treatment: Endorses comfort in bedside chair    Did c/o pain throughout standing mobility   Gama Catheter and IV  O2 Device: Nasal cannula     GROSS EVALUATION:   Within Functional Limits Abnormal/ Functional Abnormal/ Non-Functional (see comments) Not Tested Comments:   AROM [] [x] [] [] Post op L LE   PROM [] [] [] [x]    Strength [] [x] [] [] R UE and B LE functionally L>R   Balance [] [x] [] [] Fair- to poor dynamic standing   Posture [] [x] [] [] Forward head, rounded shoulders   Sensation [x] [] [] [] Intact light touch distal B LEs   Coordination [] [] [] [x]    Tone [] [] [] [x]    Edema [] [x] [] [] Distal L LE   Activity Tolerance [] [x] [] []     [] [] [] []      COGNITION/  PERCEPTION: Intact Impaired   (see comments) Comments:   Orientation [x] [] Oriented x4   Vision [] [] Not assessed   Hearing [] [] Not assessed   Command Following [x] []    Safety Awareness [] [x] Mild to moderately impulsive; required additional cues for safety    [] []      MOBILITY: I Mod I S SBA CGA Min Mod Max Total  NT x2 Comments:   Bed Mobility    Rolling [] [] [] [] [] [x] [] [] [] [] []    Supine to Sit [] [] [] [] [] [x] [] [] [] [] []    Scooting [] [] [] [] [] [x] [] [] [] [] []    Sit to Supine [] [] [] [] [] [] [] [] [] [x] []    Transfers    Sit to Stand [] [] [] [] [] [] [x] [] [] [] [x]    Bed to Chair [] [] [] [] [] [] [x] [] [] [] [x]    Stand to Sit [] [] [] [] [] [] [x] [] [] [] [x]    I=Independent, Mod I=Modified Independent, S=Supervision, SBA=Standby Assistance, CGA=Contact Guard Assistance,   Min=Minimal Assistance, Mod=Moderate Assistance, Max=Maximal Assistance, Total=Total Assistance, NT=Not Tested  GAIT: I Mod I S SBA CGA Min Mod Max Total  NT x2 Comments:   Level of Assistance [] [] [] [] [] [] [x] [] [] [] [x] 5ft to assist with turning to transfer to the chair   Distance 5ft (assist with turning to transfer to the chair)    DME Rolling Walker    Gait Quality Decreased step length on contralateral (R LE), antalgic, decreased step clearance bilaterally    Weightbearing Status WBAT, for transfers only L LE     I=Independent, Mod I=Modified Independent, S=Supervision, SBA=Standby Assistance, CGA=Contact Guard Assistance,   Min=Minimal Assistance, Mod=Moderate Assistance, Max=Maximal Assistance, Total=Total Assistance, NT=Not Tested    [address] [facility] [address] [address] AM-PAC 6 Clicks   Basic Mobility Inpatient Short Form       How much difficulty does the patient currently have. ..  Unable A Lot A Little None   1. Turning over in bed (including adjusting bedclothes, sheets and blankets)? [] 1   [] 2   [x] 3   [] 4   2. Sitting down on and standing up from a chair with arms ( e.g., wheelchair, bedside commode, etc.)   [] 1   [x] 2   [] 3   [] 4   3. Moving from lying on back to sitting on the side of the bed? [] 1   [] 2   [x] 3   [] 4   How much help from another person does the patient currently need. .. Total A Lot A Little None   4. Moving to and from a bed to a chair (including a wheelchair)? [] 1   [x] 2   [] 3   [] 4   5. Need to walk in hospital room? [] 1   [x] 2   [] 3   [] 4   6. Climbing 3-5 steps with a railing? [] 1   [x] 2   [] 3   [] 4   © 2007, Trustees of [address] [place] [address], under license to [employer]. All rights reserved     Score:  Initial: 14 Most Recent: X (Date: -- )    Interpretation of Tool:  Represents activities that are increasingly more difficult (i.e. Bed mobility, Transfers, Gait). PLAN:   FREQUENCY/DURATION: PT Plan of Care: BID for duration of hospital stay or until stated goals are met, whichever comes first.    PROBLEM LIST:   (Skilled intervention is medically necessary to address:)  1. Decreased ADL/Functional Activities  2. Decreased Activity Tolerance  3. Decreased AROM/PROM  4. Decreased Balance  5. Decreased Gait Ability  6. Decreased Strength  7. Decreased Transfer Abilities  8. Increased Pain   INTERVENTIONS PLANNED:   (Benefits and precautions of physical therapy have been discussed with the patient.)  1. Therapeutic Activity  2. Therapeutic Exercise/HEP  3. Neuromuscular Re-education  4. Gait Training  5. Manual Therapy  6. Education     TREATMENT:     EVALUATION: Moderate Complexity : (Untimed Charge)    TREATMENT:   ($$ Therapeutic Activity: 8-22 mins    )  Therapeutic Activity (10 Minutes): Therapeutic activity included Supine to Sit, Sit to Supine and Transfer Training to improve functional Mobility, ROM and Activity tolerance.    At this time, patient is appropriate for Co-treatment with occupational therapy due to patient's decreased overall endurance/tolerance levels, as well as need for high level skilled assistance to complete functional transfers/mobility and functional tasks. Ayaka Pond is appropriate for a multidisciplinary co-treatment of PT and OT to address goals of both disciplines. OT addressed sitting and standing balance as well as facilitation, while PT address bed mobility and transfer today.        TREATMENT GRID:  N/A    AFTER TREATMENT POSITION/PRECAUTIONS:  Alarm Activated, Chair, Needs within reach, RN notified and Patient needs met    INTERDISCIPLINARY COLLABORATION:  RN/PCT, PT/PTA and OT/COFFMAN    TOTAL TREATMENT DURATION:  PT Patient Time In/Time Out  Time In: 1020  Time Out: [address] [facility] [address], DPT

## 2021-04-08 LAB
COVID-19 RAPID TEST, COVR: NOT DETECTED
GLUCOSE BLD STRIP.AUTO-MCNC: 109 MG/DL (ref 65–100)
GLUCOSE BLD STRIP.AUTO-MCNC: 123 MG/DL (ref 65–100)
GLUCOSE BLD STRIP.AUTO-MCNC: 125 MG/DL (ref 65–100)
GLUCOSE BLD STRIP.AUTO-MCNC: 157 MG/DL (ref 65–100)
MM INDURATION POC: 0 MM (ref 0–5)
PPD POC: NEGATIVE NEGATIVE
SERVICE CMNT-IMP: ABNORMAL
SOURCE, COVRS: NORMAL

## 2021-04-08 PROCEDURE — 74011250637 HC RX REV CODE- 250/637: Performed by: ORTHOPAEDIC SURGERY

## 2021-04-08 PROCEDURE — 87635 SARS-COV-2 COVID-19 AMP PRB: CPT

## 2021-04-08 PROCEDURE — 97110 THERAPEUTIC EXERCISES: CPT

## 2021-04-08 PROCEDURE — 65270000029 HC RM PRIVATE

## 2021-04-08 PROCEDURE — 97112 NEUROMUSCULAR REEDUCATION: CPT

## 2021-04-08 PROCEDURE — 97530 THERAPEUTIC ACTIVITIES: CPT

## 2021-04-08 PROCEDURE — 77030038269 HC DRN EXT URIN PURWCK BARD -A

## 2021-04-08 PROCEDURE — 82962 GLUCOSE BLOOD TEST: CPT

## 2021-04-08 RX ADMIN — ASPIRIN 325 MG: 325 TABLET, COATED ORAL at 08:44

## 2021-04-08 RX ADMIN — Medication 10 ML: at 16:46

## 2021-04-08 RX ADMIN — Medication 10 ML: at 21:46

## 2021-04-08 RX ADMIN — ACETAMINOPHEN 650 MG: 325 TABLET ORAL at 06:41

## 2021-04-08 RX ADMIN — OXYCODONE 10 MG: 5 TABLET ORAL at 01:43

## 2021-04-08 RX ADMIN — Medication 5 ML: at 06:42

## 2021-04-08 RX ADMIN — LISINOPRIL AND HYDROCHLOROTHIAZIDE 2 TABLET: 12.5; 1 TABLET ORAL at 08:45

## 2021-04-08 RX ADMIN — OXYCODONE 5 MG: 5 TABLET ORAL at 09:29

## 2021-04-08 RX ADMIN — OXYCODONE 10 MG: 5 TABLET ORAL at 21:50

## 2021-04-08 RX ADMIN — Medication 5 ML: at 06:41

## 2021-04-08 RX ADMIN — Medication 10 ML: at 16:47

## 2021-04-08 NOTE — PROGRESS NOTES
ACUTE OT GOALS:  (Developed with and agreed upon by patient and/or caregiver.)  1. Patient will complete lower body bathing and dressing with Min A and adaptive equipment as needed. 2. Patient will complete toileting with Min A and adaptive equipment as needed. 3. Patient will tolerate 23 minutes of OT treatment with 1-2 rest breaks to increase activity tolerance for performance of ADLs. 4. Patient will complete functional transfers from sit to stand with CGA and adaptive equipment as needed. 5. Patient will complete SPT from bed to chair with Min A and adaptive equipment as needed. 6. Patient will complete BUE exercises to increase strength in RUE to at least a 4/5 for performance of ADLs and functional transfers.      Timeframe: 7 visits     OCCUPATIONAL THERAPY: Daily Note and PM OT Treatment Day # 2   WBAT for TRANSFERS ONLY MAURY Soto is a 64 y.o. female   PRIMARY DIAGNOSIS: <principal problem not specified>  Femur fracture, left ([facility]) [S72.92XA]  Closed fracture of left distal femur ([facility]) [S72.402A]  Procedure(s) (LRB):  FEMUR INSERTION RETROGRADE  NAIL LEFT [place] (Left)  2 Days Post-Op  Payor: /   ASSESSMENT:     REHAB RECOMMENDATIONS: CURRENT LEVEL OF FUNCTION:  (Most Recently Demonstrated)   Recommendation to date pending progress:  Setting:   Short-term Rehab  Equipment:    To Be Determined Bathing:   Not tested  Dressing:   Not tested  Feeding/Grooming:   Not tested  Toileting:   Not tested  Functional Mobility:   Moderate Assistance x 2      ASSESSMENT:  Ms. Sanjay Rosa is doing fair today. Pt performed UE exercises to increase strength and activity tolerance. Pt able to perform mobility with a little less assistance today. Pt demonstrates fair standing balance during transfer today. Introduced to AE to assist with LB dressing today. Pt making some progress with goals. Will continue to benefit from skilled OT during stay.      SUBJECTIVE:   Ms. Sanjay Rosa states, \"I work at [employer]\"    SOCIAL HISTORY/LIVING ENVIRONMENT: Pt lives with two sons (both working second shift) in one level home with 3 YUNG. Pt is typically Mod I for ADLs and Mod I for functional mobility with use of RW or SPC. Pt works full-time at [employer] and reports use of RW or cane throughout the day. Pt drives.      OBJECTIVE:     PAIN: VITAL SIGNS: LINES/DRAINS:   Pre Treatment: Pain Screen  Pain Scale 1: Numeric (0 - 10)  Pain Intensity 1: 0  Post Treatment: Unchanged   Gama Catheter  O2 Device: Room air     ACTIVITIES OF DAILY LIVING: I Mod I S SBA CGA Min Mod Max Total NT Comments   BASIC ADLs:              Bathing/ Showering [] [] [] [] [] [] [] [] [] [x]    Toileting [] [] [] [] [] [] [] [] [] [x]    Dressing [] [] [] [] [] [] [] [] [] [x]    Feeding [] [] [] [] [] [] [] [] [] [x]    Grooming [] [] [] [] [] [] [] [] [] [x]    Personal Device Care [] [] [] [] [] [] [] [] [] [x]    Functional Mobility [] [] [] [] [] [] [x] [] [] [] Assist x 2      I=Independent, Mod I=Modified Independent, S=Supervision, SBA=Standby Assistance, CGA=Contact Guard Assistance,   Min=Minimal Assistance, Mod=Moderate Assistance, Max=Maximal Assistance, Total=Total Assistance, NT=Not Tested    MOBILITY: I Mod I S SBA CGA Min Mod Max Total  NT x2 Comments:   Supine to sit [] [] [] [] [] [x] [] [] [] [] []    Sit to supine [] [] [] [] [] [] [] [] [] [x] []    Sit to stand [] [] [] [] [] [] [x] [] [] [] [x]    Bed to chair [] [] [] [] [] [x] [x] [] [] [x] [] Mod A x 2   I=Independent, Mod I=Modified Independent, S=Supervision, SBA=Standby Assistance, CGA=Contact Guard Assistance,   Min=Minimal Assistance, Mod=Moderate Assistance, Max=Maximal Assistance, Total=Total Assistance, NT=Not Tested    BALANCE: Good Fair+ Fair Fair- Poor NT Comments   Sitting Static [] [x] [] [] [] []    Sitting Dynamic [] [] [x] [] [] []              Standing Static [] [] [] [x] [] []    Standing Dynamic [] [] [] [] [x] []      PLAN:   FREQUENCY/DURATION: OT Plan of Care: 3 times/week for duration of hospital stay or until stated goals are met, whichever comes first.    TREATMENT:   TREATMENT:   Therapeutic Exercise (10 Minutes): Therapeutic exercises noted below to improve functional activity tolerance, strength and mobility. Neuromuscular Re-education (15 Minutes): Neuromuscular Re-education included Balance Training, Coordination training, Postural training, Sitting balance training and Standing balance training to improve Balance, Coordination, Functional Mobility and Postural Control.     TREATMENT GRID:   Date:  4/8/21 Date:   Date:     Activity/Exercise Parameters Parameters Parameters   Shoulder Abd/Adduction 10 reps     Shoulder Flexion 10 reps     Elbow Flexion 10 reps     Punches 10 reps                         AFTER TREATMENT POSITION/PRECAUTIONS:  Chair, Needs within reach and RN notified    INTERDISCIPLINARY COLLABORATION:  RN/PCT, PT/PTA and OT/COFFMAN    TOTAL TREATMENT DURATION:  OT Patient Time In/Time Out  Time In: 1020  Time Out: Zacarias Valencia

## 2021-04-08 NOTE — PROGRESS NOTES
Progress Note    Patient: Sedrick Jones MRN: [id number]  SSN: [id number]    YOB: 1960  Age: 64 y.o. Sex: female      Admit Date: 4/5/2021    LOS: 2 days     Subjective:     Patient reports pain is doing pretty well    Objective:     Vitals:    04/07/21 1905 04/07/21 2338 04/08/21 0339 04/08/21 0743   BP: 102/63 114/67 123/73 104/65   Pulse: 90 99 (!) 102 (!) 106   Resp: 16 16 18 18   Temp: 98.1 °F (36.7 °C) 97.4 °F (36.3 °C) 98.3 °F (36.8 °C) 98.3 °F (36.8 °C)   SpO2: 92% 93% 91% 92%   Weight:       Height:            Intake and Output:  Current Shift: No intake/output data recorded. Last three shifts: 04/06 1901 - 04/08 0700  In: -   Out: 700 [Urine:700]    alert and oriented to person place time and situation  Skin - dressing clean dry and intact  Motor and sensory function intact in LEFT LOWER extremity  Pulses palpable in LEFT LOWER extremity       Lab/Data Review:  CBC: No results found for: WBC, HGB, HGBEXT, HCT, HCTEXT, PLT, PLTEXT, HGBEXT, HCTEXT, PLTEXT       Assessment:     Acute postop blood loss anemia with hemoglobin stable for now, POD #2 from retrograde intramedullary nail fixation of left femur fracture    Plan:     Patient can continue with physical therapy and discharge planning.   She is weightbearing as tolerated for transfers left lower extremity    Signed By: Rajan Perkins MD     April 8, 2021

## 2021-04-08 NOTE — PROGRESS NOTES
ACUTE PHYSICAL THERAPY GOALS:  (Developed with and agreed upon by patient and/or caregiver.)  ST. Patient will perform bed mobility with CONTACT GUARD ASSISTANCE within 3 days. 2. Patient will transfer bed to chair with MODERATE ASSISTANCE x1 within 3 days. 3. Patient will demonstrate FAIR STATIC STANDING balance within 3 day(s). 4. Patient will tolerate 15+ minutes of therapeutic activity/exercise and/or neuromuscular re-education while maintaining stable vitals to improve functional strength and activity tolerance within 3 days.     LT. Patient will perform bed mobility with STAND BY ASSISTANCE within 7 days. 2. Patient will transfer bed to chair with MINIMAL ASSISTANCE x1 within 7 days. 3. Patient will demonstrate FAIR DYNAMIC STANDING balance within 7 day(s). .  4. Patient will tolerate 25+ minutes of therapeutic activity/exercise and/or neuromuscular re-education while maintaining stable vitals to improve functional strength and activity tolerance within 7 days. PHYSICAL THERAPY: Daily Note and AM Treatment Day # 2     WBAT for transfers only    Augusta Session is a 64 y.o. female   PRIMARY DIAGNOSIS: <principal problem not specified>  Femur fracture, left ([facility]) [S72.92XA]  Closed fracture of left distal femur ([facility]) [S72.402A]  Procedure(s) (LRB):  FEMUR INSERTION RETROGRADE  NAIL LEFT [place] (Left)  2 Days Post-Op    ASSESSMENT:     REHAB RECOMMENDATIONS: CURRENT LEVEL OF FUNCTION:  (Most Recently Demonstrated)   Recommendation to date pending progress:  Setting:   Short-term Rehab  Equipment:    To Be Determined Bed Mobility:  Contact Guard Assistance to min assist  Sit to Stand:   Minimal Assistance x 2 to mod assist x 2  Transfers:   Minimal Assistance x 2 to mod assist x 2  Gait/Mobility:   Minimal Assistance x 2 to mod assist x 2     ASSESSMENT:  Ms. Clay Lutz is supine in bed, agrees to therapy. She is moving a little better today requiring less assistance with overall mobility. Required cues for gait mechanics, assistive device utilization, and safety throughout transfer. She performed below LE exercises and was left up in chair. She is making progress towards goals and puts forth good effort with therapy. Will continue with POC. SUBJECTIVE:   Ms. Marychuy Marcial states, \"it's a little easier today. \"    SOCIAL HISTORY/ LIVING ENVIRONMENT: Lives with two adult sons (who work during the day)  In a single level residence with 3 steps and B handrail to enter. At baseline, patient is a modified independent  ambulator with use of RW. Patient endorses balance impairment at baseline as well as decreased functional   Activity tolerance secondary to chronic low back pain.       OBJECTIVE:     PAIN: VITAL SIGNS: LINES/DRAINS:   Pre Treatment:    Post Treatment: not rated   IV and Purewick  O2 Device: Room air     MOBILITY: I Mod I S SBA CGA Min Mod Max Total  NT x2 Comments:   Bed Mobility    Rolling [] [] [] [] [] [] [] [] [] [] []    Supine to Sit [] [] [] [] [x] [x] [] [] [] [] []    Scooting [] [] [] [x] [] [] [] [] [] [] []    Sit to Supine [] [] [] [] [] [] [] [] [] [] []    Transfers    Sit to Stand [] [] [] [] [] [x] [x] [] [] [] [x]    Bed to Chair [] [] [] [] [] [x] [x] [] [] [] [x]    Stand to Sit [] [] [] [] [] [x] [x] [] [] [] [x]    I=Independent, Mod I=Modified Independent, S=Supervision, SBA=Standby Assistance, CGA=Contact Guard Assistance,   Min=Minimal Assistance, Mod=Moderate Assistance, Max=Maximal Assistance, Total=Total Assistance, NT=Not Tested    BALANCE: Good Fair+ Fair Fair- Poor NT Comments   Sitting Static [x] [] [] [] [] []    Sitting Dynamic [] [x] [] [] [] []              Standing Static [] [] [x] [] [] []    Standing Dynamic [] [] [x] [] [] []      GAIT: I Mod I S SBA CGA Min Mod Max Total  NT x2 Comments:   Level of Assistance [] [] [] [] [] [x] [x] [] [] [] [x]    Distance 4' to chair    DME Rolling Walker    Gait Quality Decreased step length on contralateral (R LE), antalgic, decreased step clearance bilaterally    Weightbearing  Status WBAT     I=Independent, Mod I=Modified Independent, S=Supervision, SBA=Standby Assistance, CGA=Contact Guard Assistance,   Min=Minimal Assistance, Mod=Moderate Assistance, Max=Maximal Assistance, Total=Total Assistance, NT=Not Tested    PLAN:   FREQUENCY/DURATION: PT Plan of Care: BID for duration of hospital stay or until stated goals are met, whichever comes first.  TREATMENT:     TREATMENT:   ($$ Therapeutic Activity: 8-22 mins  $$ Therapeutic Exercises: 8-22 mins    )  Co-Treatment PT/OT necessary due to patient's decreased overall endurance/tolerance levels, as well as need for high level skilled assistance to complete functional transfers/mobility and functional tasks  Therapeutic Activity (10 Minutes): Therapeutic activity included Supine to Sit, Scooting, Transfer Training, Sitting balance  and Standing balance to improve functional Mobility, Strength and Activity tolerance. Therapeutic Exercise (13 Minutes): Therapeutic exercises noted below to improve functional activity tolerance, AROM and strength.      TREATMENT GRID:     Date:  4/8/21 Date:   Date:     ACTIVITY/EXERCISE AM PM AM PM AM PM   Ankle pumps X 15 B        LAQ X 15 B, AA-L        Hip fleixon X 15 B, AA-L        Hip abd X 15 B, AA-L                                   B = bilateral; AA = active assistive; A = active; P = passive    AFTER TREATMENT POSITION/PRECAUTIONS:  Chair, Needs within reach and RN notified    INTERDISCIPLINARY COLLABORATION:  RN/PCT, PT/PTA and OT/COFFMAN    TOTAL TREATMENT DURATION:  PT Patient Time In/Time Out  Time In: 1026  Time Out: 1700 Jas Marinelli PTA

## 2021-04-08 NOTE — PROGRESS NOTES
Patient accepted and selected for [facility]. Rapid covid ordered.   CM awaiting update from [address] r/t STR approval.

## 2021-04-08 NOTE — PROGRESS NOTES
CM spoke with Alphonso Abreu from [facility] ([phone]) as this is a worker's comp situation. She will need all medical records pertinent to this admission sent to [phone]. CM in to speak with patient. Verbal permission given by patient to send medical records to [facility] as requested. Therapy recommendations for STR reviewed and patient is agreeable to go to STR at [facility]. List of Medicare certified facilities provided and reviewed and patient would like referrals sent to [facility] [address] [place] [address]. PPD in process and Covid testing will be needed. All information faxed to [facility], referrals sent and facility liaisons contacted for review. Care Management Interventions  PCP Verified by CM: Yes(Nathanael Allen)  Mode of Transport at Discharge: BLS  Transition of Care Consult (CM Consult): SNF  Partner SNF: Yes  Discharge Durable Medical Equipment: No  Physical Therapy Consult: Yes  Occupational Therapy Consult: Yes  Speech Therapy Consult: No  Current Support Network: Own Home  Confirm Follow Up Transport: Family  The Plan for Transition of Care is Related to the Following Treatment Goals : Patient will partcipate in SNF therapies in order to return to baseline independence in ADLs.    The Patient and/or Patient Representative was Provided with a Choice of Provider and Agrees with the Discharge Plan?: Yes  Freedom of Choice List was Provided with Basic Dialogue that Supports the Patient's Individualized Plan of Care/Goals, Treatment Preferences and Shares the Quality Data Associated with the Providers?: Yes  Discharge Location  Discharge Placement: Skilled nursing facility

## 2021-04-09 LAB
GLUCOSE BLD STRIP.AUTO-MCNC: 110 MG/DL (ref 65–100)
GLUCOSE BLD STRIP.AUTO-MCNC: 132 MG/DL (ref 65–100)
GLUCOSE BLD STRIP.AUTO-MCNC: 145 MG/DL (ref 65–100)
SERVICE CMNT-IMP: ABNORMAL

## 2021-04-09 PROCEDURE — 74011250637 HC RX REV CODE- 250/637: Performed by: ORTHOPAEDIC SURGERY

## 2021-04-09 PROCEDURE — 65270000029 HC RM PRIVATE

## 2021-04-09 PROCEDURE — 82962 GLUCOSE BLOOD TEST: CPT

## 2021-04-09 PROCEDURE — 77030038269 HC DRN EXT URIN PURWCK BARD -A

## 2021-04-09 PROCEDURE — 97530 THERAPEUTIC ACTIVITIES: CPT

## 2021-04-09 PROCEDURE — 97110 THERAPEUTIC EXERCISES: CPT

## 2021-04-09 RX ORDER — OXYCODONE AND ACETAMINOPHEN 5; 325 MG/1; MG/1
2 TABLET ORAL
Qty: 60 TAB | Refills: 0 | Status: SHIPPED | OUTPATIENT
Start: 2021-04-09 | End: 2021-04-14

## 2021-04-09 RX ADMIN — LISINOPRIL AND HYDROCHLOROTHIAZIDE 2 TABLET: 12.5; 1 TABLET ORAL at 09:29

## 2021-04-09 RX ADMIN — ASPIRIN 325 MG: 325 TABLET, COATED ORAL at 09:29

## 2021-04-09 RX ADMIN — Medication 10 ML: at 05:41

## 2021-04-09 RX ADMIN — Medication 10 ML: at 21:12

## 2021-04-09 RX ADMIN — Medication 10 ML: at 13:05

## 2021-04-09 RX ADMIN — OXYCODONE 10 MG: 5 TABLET ORAL at 15:14

## 2021-04-09 RX ADMIN — OXYCODONE 10 MG: 5 TABLET ORAL at 09:29

## 2021-04-09 RX ADMIN — POLYETHYLENE GLYCOL 3350 17 G: 17 POWDER, FOR SOLUTION ORAL at 16:13

## 2021-04-09 NOTE — DISCHARGE SUMMARY
ORTHOPAEDIC SURGERY DISCHARGE SUMMARY      Patient ID:  Cornelia Aguilar  [id number]  64 y.o.  1960    Admit date: 4/5/2021  Discharge date and time:  4/13/21  Admitting Physician: Sandie Watson MD  Surgeon: Nalini Garcia Course    Admission Diagnoses: Pre op diagnosis: Left Distal Femur Fracture  Prior to surgery the patient was seen for consultation in the office or hospital and a complete history and physical was taken as it pertained to their condition. Discharge Diagnoses: <principal problem not specified>. Chronic and Acute medical problems addressed during this hospital stay by consulting physicians include: They underwent Procedure(s) (LRB):  FEMUR INSERTION RETROGRADE  NAIL LEFT [place] (Left) for this. Principle Problem: <principal problem not specified>. Other Chronic and Acute Medical Issues: managed by the hospitalist during admission included:  Active Problems:    Femur fracture, left ([address] (4/6/2021)      Closed fracture of left distal femur ([address] (4/6/2021)             Hospital Medications:   Current Facility-Administered Medications   Medication Dose Route Frequency    sodium chloride (NS) flush 5-40 mL  5-40 mL IntraVENous Q8H    sodium chloride (NS) flush 5-40 mL  5-40 mL IntraVENous PRN    polyethylene glycol (MIRALAX) packet 17 g  17 g Oral DAILY PRN    oxyCODONE IR (ROXICODONE) tablet 5-10 mg  5-10 mg Oral Q4H PRN    lisinopril-hydroCHLOROthiazide (PRINZIDE, ZESTORETIC) 10-12.5 mg per tablet 2 Tab  2 Tab Oral DAILY    sodium chloride (NS) flush 5-40 mL  5-40 mL IntraVENous Q8H    sodium chloride (NS) flush 5-40 mL  5-40 mL IntraVENous PRN    HYDROmorphone (DILAUDID) injection 0.5 mg  0.5 mg IntraVENous Q2H PRN    sodium chloride (NS) flush 5-40 mL  5-40 mL IntraVENous Q8H    sodium chloride (NS) flush 5-40 mL  5-40 mL IntraVENous PRN    acetaminophen (TYLENOL) tablet 650 mg  650 mg Oral Q6H PRN    Or    acetaminophen (TYLENOL) suppository 650 mg  650 mg Rectal Q6H PRN    polyethylene glycol (MIRALAX) packet 17 g  17 g Oral DAILY PRN    promethazine (PHENERGAN) tablet 12.5 mg  12.5 mg Oral Q6H PRN    Or    ondansetron (ZOFRAN) injection 4 mg  4 mg IntraVENous Q6H PRN    aspirin delayed-release tablet 325 mg  325 mg Oral DAILY         Additional DVT Prophylaxis:    mg 1 p.o. daily    Hospital Course-patient was admitted to the orthopedic surgery service on 4/5/2021. She was taken to the operating room on 4/6/2021 and underwent retrograde intramedullary nail fixation of the left distal femur fracture. Post Op complications: none       Physical Therapy: PT was started on the day of surgery and progressed. She was allowed to weight-bear as tolerated for transfers only no ambulation with her left lower extremity. She can also be full active and gentle passive range of motion of her left knee  PT/OT:       Activity Response: Fairly tolerated  Assistive Device: Fall prevention device                  Disposition: Good    Upon Discharge: The wound appears to be healing without any evidence of infection.       Pt Discharged to: home with [facility]     Discharge instructions:    -weight bearing status-may weight-bear as tolerated for transfers only left lower extremity this should be true weightbearing as tolerated in that if she is having a lot of pain she should not be forced to weight-bear  - Refer to the Medication Reconciliation sheet for all discharge medications   -Rx pain medication given   -Resume pre hospital diet              -Ambulate with walker   -Follow up in office with [facility] in 2 weeks       Signed:  Dotty Franco MD  4/9/2021  10:57 AM

## 2021-04-09 NOTE — PROGRESS NOTES
ACUTE PHYSICAL THERAPY GOALS:  (Developed with and agreed upon by patient and/or caregiver.)  ST. Patient will perform bed mobility with CONTACT GUARD ASSISTANCE within 3 days. 2. Patient will transfer bed to chair with MODERATE ASSISTANCE x1 within 3 days. 3. Patient will demonstrate FAIR STATIC STANDING balance within 3 day(s). 4. Patient will tolerate 15+ minutes of therapeutic activity/exercise and/or neuromuscular re-education while maintaining stable vitals to improve functional strength and activity tolerance within 3 days.     LT. Patient will perform bed mobility with STAND BY ASSISTANCE within 7 days. 2. Patient will transfer bed to chair with MINIMAL ASSISTANCE x1 within 7 days. 3. Patient will demonstrate FAIR DYNAMIC STANDING balance within 7 day(s). .  4. Patient will tolerate 25+ minutes of therapeutic activity/exercise and/or neuromuscular re-education while maintaining stable vitals to improve functional strength and activity tolerance within 7 days. PHYSICAL THERAPY: Daily Note and PM Treatment Day # 3     WBAT for transfers only    Dale Ingram is a 64 y.o. female   PRIMARY DIAGNOSIS: <principal problem not specified>  Femur fracture, left ([facility]) [S72.92XA]  Closed fracture of left distal femur ([facility]) [S72.402A]  Procedure(s) (LRB):  FEMUR INSERTION RETROGRADE  NAIL LEFT [place] (Left)  3 Days Post-Op    ASSESSMENT:     REHAB RECOMMENDATIONS: CURRENT LEVEL OF FUNCTION:  (Most Recently Demonstrated)   Recommendation to date pending progress:  Setting:   Short-term Rehab  Equipment:    To Be Determined Bed Mobility:  Contact Guard Assistance  Sit to Stand:   Minimal Assistance x 2   Transfers:   Minimal Assistance to mod assist   Gait/Mobility:   Minimal Assistance to mod assist x 1-2     ASSESSMENT:  Ms. Nata Michael is supine in bed, agrees to therapy. She worked on bed mobility, standing, transfers, and LE exercises. She is performing exercises ad kb throughout the day also. She pushes herself with therapy. She was left up in chair with needs in reach. Slow progress. Will continue with POC. PM - pt up in chair and wanting to use BSC. She stood and transferred to [facility] using rolling walker and min/mod assist x 1, SBA x 1. She wanted to be left up and RN notified. Will continue with POC. SUBJECTIVE:   Ms. Kayy Smith states, \"it's a little easier today. \"    SOCIAL HISTORY/ LIVING ENVIRONMENT: Lives with two adult sons (who work during the day)  In a single level residence with 3 steps and B handrail to enter. At baseline, patient is a modified independent  ambulator with use of RW. Patient endorses balance impairment at baseline as well as decreased functional   Activity tolerance secondary to chronic low back pain.       OBJECTIVE:     PAIN: VITAL SIGNS: LINES/DRAINS:   Pre Treatment:    Post Treatment: not rated   IV and Purewick  O2 Device: Room air     MOBILITY: I Mod I S SBA CGA Min Mod Max Total  NT x2 Comments:   Bed Mobility    Rolling [] [] [] [] [] [] [] [] [] [] []    Supine to Sit [] [] [] [] [x] [] [] [] [] [] []    Scooting [] [] [] [x] [] [] [] [] [] [] []    Sit to Supine [] [] [] [] [] [] [] [] [] [] []    Transfers    Sit to Stand [] [] [] [] [] [x] [x] [] [] [] [x]    Bed to Chair [] [] [] [] [] [x] [x] [] [] [] [x]    Stand to Sit [] [] [] [] [] [x] [x] [] [] [] []    I=Independent, Mod I=Modified Independent, S=Supervision, SBA=Standby Assistance, CGA=Contact Guard Assistance,   Min=Minimal Assistance, Mod=Moderate Assistance, Max=Maximal Assistance, Total=Total Assistance, NT=Not Tested    BALANCE: Good Fair+ Fair Fair- Poor NT Comments   Sitting Static [x] [] [] [] [] []    Sitting Dynamic [] [x] [] [] [] []              Standing Static [] [] [x] [] [] []    Standing Dynamic [] [] [x] [] [] []      GAIT: I Mod I S SBA CGA Min Mod Max Total  NT x2 Comments:   Level of Assistance [] [] [] [] [] [x] [x] [] [] [] [] SBA x 1 also for safety   Distance 2' to [facility] DME Rolling Walker    Gait Quality Decreased step length on contralateral (R LE), antalgic, decreased step clearance bilaterally    Weightbearing  Status WBAT     I=Independent, Mod I=Modified Independent, S=Supervision, SBA=Standby Assistance, CGA=Contact Guard Assistance,   Min=Minimal Assistance, Mod=Moderate Assistance, Max=Maximal Assistance, Total=Total Assistance, NT=Not Tested    PLAN:   FREQUENCY/DURATION: PT Plan of Care: BID for duration of hospital stay or until stated goals are met, whichever comes first.  TREATMENT:     TREATMENT:   ($$ Therapeutic Activity: 8-22 mins  $$ Therapeutic Exercises: 8-22 mins    )  Therapeutic Activity (15 Minutes): Therapeutic activity included Supine to Sit, Scooting, Transfer Training, Sitting balance  and Standing balance to improve functional Mobility, Strength and Activity tolerance.     TREATMENT GRID:     Date:  4/8/21 Date:  4/9/21 Date:     ACTIVITY/EXERCISE AM PM AM PM AM PM   Ankle pumps X 15 B  X 15 B      LAQ X 15 B, AA-L  X 15 L A-AA      Hip fleixon X 15 B, AA-L  X 15 L A-AA      Hip abd X 15 B, AA-L  X 15 L A-AA                                 B = bilateral; AA = active assistive; A = active; P = passive    AFTER TREATMENT POSITION/PRECAUTIONS:  Needs within reach and BSC    INTERDISCIPLINARY COLLABORATION:  RN/PCT and PT/PTA    TOTAL TREATMENT DURATION:  PT Patient Time In/Time Out  Time In: 1545  Time Out: Lilly 81, PTA

## 2021-04-09 NOTE — PROGRESS NOTES
Problem: Falls - Risk of  Goal: *Absence of Falls  Description: Document Celina Sánchez Fall Risk and appropriate interventions in the flowsheet.   Outcome: Progressing Towards Goal  Note: Fall Risk Interventions:            Medication Interventions: Bed/chair exit alarm, Patient to call before getting OOB    Elimination Interventions: Call light in reach, Bed/chair exit alarm    History of Falls Interventions: Bed/chair exit alarm         Problem: Patient Education: Go to Patient Education Activity  Goal: Patient/Family Education  Outcome: Progressing Towards Goal     Problem: Patient Education: Go to Patient Education Activity  Goal: Patient/Family Education  Outcome: Progressing Towards Goal     Problem: Lower Extremity Fracture:Day of Admission  Goal: Off Pathway (Use only if patient is Off Pathway)  Outcome: Progressing Towards Goal  Goal: Activity/Safety  Outcome: Progressing Towards Goal  Goal: Consults, if ordered  Outcome: Progressing Towards Goal  Goal: Diagnostic Test/Procedures  Outcome: Progressing Towards Goal  Goal: Nutrition/Diet  Outcome: Progressing Towards Goal  Goal: Medications  Outcome: Progressing Towards Goal  Goal: Respiratory  Outcome: Progressing Towards Goal  Goal: Treatments/Interventions/Procedures  Outcome: Progressing Towards Goal  Goal: Psychosocial  Outcome: Progressing Towards Goal  Goal: *Optimal pain control at patient's stated goal  Outcome: Progressing Towards Goal  Goal: *Hemodynamically stable  Outcome: Progressing Towards Goal  Goal: *Adequate oxygenation  Outcome: Progressing Towards Goal     Problem: Lower Extremity Fracture:Day of Surgery ([facility] SCIP Measures for Post-op Care)  Goal: Off Pathway (Use only if patient is Off Pathway)  Outcome: Progressing Towards Goal  Goal: Activity/Safety  Outcome: Progressing Towards Goal  Goal: Consults, if ordered  Outcome: Progressing Towards Goal  Goal: Diagnostic Test/Procedures  Outcome: Progressing Towards Goal  Goal: Nutrition/Diet  Outcome: Progressing Towards Goal  Goal: Medications  Outcome: Progressing Towards Goal  Goal: Respiratory  Outcome: Progressing Towards Goal  Goal: Treatments/Interventions/Procedures  Outcome: Progressing Towards Goal  Goal: Psychosocial  Outcome: Progressing Towards Goal  Goal: *Optimal pain control at patient's stated goal  Outcome: Progressing Towards Goal  Goal: *Hemodynamically stable  Outcome: Progressing Towards Goal  Goal: *Adequate oxygenation  Outcome: Progressing Towards Goal     Problem: Lower Extremity Fracture:Post-op Day 1  Goal: Off Pathway (Use only if patient is Off Pathway)  Outcome: Progressing Towards Goal  Goal: Activity/Safety  Outcome: Progressing Towards Goal  Goal: Consults, if ordered  Outcome: Progressing Towards Goal  Goal: Diagnostic Test/Procedures  Outcome: Progressing Towards Goal  Goal: Nutrition/Diet  Outcome: Progressing Towards Goal  Goal: Discharge Planning  Outcome: Progressing Towards Goal  Goal: Medications  Outcome: Progressing Towards Goal  Goal: Respiratory  Outcome: Progressing Towards Goal  Goal: Treatments/Interventions/Procedures  Outcome: Progressing Towards Goal  Goal: Psychosocial  Outcome: Progressing Towards Goal  Goal: *Optimal pain control at patient's stated goal  Outcome: Progressing Towards Goal  Goal: *Hemodynamically stable  Outcome: Progressing Towards Goal  Goal: *Adequate oxygenation  Outcome: Progressing Towards Goal  Goal: *PT/INR within defined limits  Outcome: Progressing Towards Goal  Goal: *Demonstrates progressive activity  Outcome: Progressing Towards Goal     Problem: Lower Extremity Fracture:Post-op Day 2  Goal: Off Pathway (Use only if patient is Off Pathway)  Outcome: Progressing Towards Goal  Goal: Activity/Safety  Outcome: Progressing Towards Goal  Goal: Diagnostic Test/Procedures  Outcome: Progressing Towards Goal  Goal: Nutrition/Diet  Outcome: Progressing Towards Goal  Goal: Discharge Planning  Outcome: Progressing Towards Goal  Goal: Medications  Outcome: Progressing Towards Goal  Goal: Respiratory  Outcome: Progressing Towards Goal  Goal: Treatments/Interventions/Procedures  Outcome: Progressing Towards Goal  Goal: Psychosocial  Outcome: Progressing Towards Goal  Goal: *Optimal pain control at patient's stated goal  Outcome: Progressing Towards Goal  Goal: *Hemodynamically stable  Outcome: Progressing Towards Goal  Goal: *Adequate oxygenation  Outcome: Progressing Towards Goal  Goal: *PT/INR within defined limits  Outcome: Progressing Towards Goal  Goal: *Demonstrates progressive activity  Outcome: Progressing Towards Goal  Goal: *Voiding  Outcome: Progressing Towards Goal  Goal: *Bowel movement  Outcome: Progressing Towards Goal  Goal: *Tolerating diet  Outcome: Progressing Towards Goal     Problem: Lower Extremity Fracture:Post-op Day 3  Goal: Off Pathway (Use only if patient is Off Pathway)  Outcome: Progressing Towards Goal  Goal: Activity/Safety  Outcome: Progressing Towards Goal  Goal: Diagnostic Test/Procedures  Outcome: Progressing Towards Goal  Goal: Nutrition/Diet  Outcome: Progressing Towards Goal  Goal: Discharge Planning  Outcome: Progressing Towards Goal  Goal: Medications  Outcome: Progressing Towards Goal  Goal: Respiratory  Outcome: Progressing Towards Goal  Goal: Treatments/Interventions/Procedures  Outcome: Progressing Towards Goal  Goal: Psychosocial  Outcome: Progressing Towards Goal  Goal: *Optimal pain control at patient's stated goal  Outcome: Progressing Towards Goal  Goal: *Hemodynamically stable  Outcome: Progressing Towards Goal  Goal: *Adequate oxygenation  Outcome: Progressing Towards Goal  Goal: *PT/INR within defined limits  Outcome: Progressing Towards Goal  Goal: *Demonstrates progressive activity  Outcome: Progressing Towards Goal  Goal: *Voiding  Outcome: Progressing Towards Goal  Goal: *Bowel movement  Outcome: Progressing Towards Goal  Goal: *Tolerating diet  Outcome: Progressing Towards Goal     Problem: Lower Extremity Fracture:Post-op Day 4  Goal: Off Pathway (Use only if patient is Off Pathway)  Outcome: Progressing Towards Goal  Goal: Activity/Safety  Outcome: Progressing Towards Goal  Goal: Diagnostic Test/Procedures  Outcome: Progressing Towards Goal  Goal: Nutrition/Diet  Outcome: Progressing Towards Goal  Goal: Discharge Planning  Outcome: Progressing Towards Goal  Goal: Medications  Outcome: Progressing Towards Goal  Goal: Respiratory  Outcome: Progressing Towards Goal  Goal: Treatments/Interventions/Procedures  Outcome: Progressing Towards Goal  Goal: Psychosocial  Outcome: Progressing Towards Goal     Problem: Lower Extremity Fracture:Discharge Outcomes  Goal: *Hemodynamically stable  Outcome: Progressing Towards Goal  Goal: *Modified independence with transfers, ambulation on levels with assistance devices, stair climbing, ADL's  Outcome: Progressing Towards Goal  Goal: *Independent with active exercises  Outcome: Progressing Towards Goal  Goal: *Describes follow-up/return visits to physicians  Outcome: Progressing Towards Goal  Goal: *Tolerating diet  Outcome: Progressing Towards Goal  Goal: *Optimal pain control at patient's stated goal  Outcome: Progressing Towards Goal  Goal: *Adequate air exchange  Outcome: Progressing Towards Goal  Goal: *Lungs clear or at baseline  Outcome: Progressing Towards Goal  Goal: *Afebrile  Outcome: Progressing Towards Goal  Goal: *Incision intact without signs of infection, redness, warmth  Outcome: Progressing Towards Goal  Goal: *Absence of deep venous thrombosis signs and symptoms(Stroke Metric)  Outcome: Progressing Towards Goal  Goal: *Active bowel function  Outcome: Progressing Towards Goal  Goal: *Adequate urinary output  Outcome: Progressing Towards Goal  Goal: *Discharge anxiety minimal  Outcome: Progressing Towards Goal  Goal: *Describes available resources and support systems  Outcome: Progressing Towards Goal

## 2021-04-09 NOTE — PROGRESS NOTES
Call placed to [phone], for follow up on STR placement. Facility liaison messaged for update and they will be contacting [facility] according to the [facility] liaison as well and will update CM. Attending notified and discharge packet created. PPD and Covid are complete. CM is hopeful for discharge today.

## 2021-04-09 NOTE — PROGRESS NOTES
Facility has spoken with W.NATALIE Listia and is now contacting the . They will update this CM today or if update available on weekend, facility will contact on call CM.

## 2021-04-09 NOTE — PROGRESS NOTES
Problem: Falls - Risk of  Goal: *Absence of Falls  Description: Document Heriberto Dana Fall Risk and appropriate interventions in the flowsheet.   Outcome: Progressing Towards Goal  Note: Fall Risk Interventions:            Medication Interventions: Bed/chair exit alarm    Elimination Interventions: Call light in reach, Bed/chair exit alarm    History of Falls Interventions: Door open when patient unattended, Bed/chair exit alarm

## 2021-04-09 NOTE — PROGRESS NOTES
ACUTE PHYSICAL THERAPY GOALS:  (Developed with and agreed upon by patient and/or caregiver.)  ST. Patient will perform bed mobility with CONTACT GUARD ASSISTANCE within 3 days. 2. Patient will transfer bed to chair with MODERATE ASSISTANCE x1 within 3 days. 3. Patient will demonstrate FAIR STATIC STANDING balance within 3 day(s). 4. Patient will tolerate 15+ minutes of therapeutic activity/exercise and/or neuromuscular re-education while maintaining stable vitals to improve functional strength and activity tolerance within 3 days.     LT. Patient will perform bed mobility with STAND BY ASSISTANCE within 7 days. 2. Patient will transfer bed to chair with MINIMAL ASSISTANCE x1 within 7 days. 3. Patient will demonstrate FAIR DYNAMIC STANDING balance within 7 day(s). .  4. Patient will tolerate 25+ minutes of therapeutic activity/exercise and/or neuromuscular re-education while maintaining stable vitals to improve functional strength and activity tolerance within 7 days. PHYSICAL THERAPY: Daily Note and AM Treatment Day # 3     WBAT for transfers only    Federico Webster is a 64 y.o. female   PRIMARY DIAGNOSIS: <principal problem not specified>  Femur fracture, left ([facility]) [S72.92XA]  Closed fracture of left distal femur ([facility]) [S72.402A]  Procedure(s) (LRB):  FEMUR INSERTION RETROGRADE  NAIL LEFT Naseem (Left)  3 Days Post-Op    ASSESSMENT:     REHAB RECOMMENDATIONS: CURRENT LEVEL OF FUNCTION:  (Most Recently Demonstrated)   Recommendation to date pending progress:  Setting:   Short-term Rehab  Equipment:    To Be Determined Bed Mobility:  Contact Guard Assistance  Sit to Stand:   Minimal Assistance x 2 to mod assist x 2  Transfers:   Minimal Assistance to mod assist   Gait/Mobility:   Minimal Assistance to mod assist x 1-2     ASSESSMENT:  Ms. Chuckie Nelson is supine in bed, agrees to therapy. She worked on bed mobility, standing, transfers, and LE exercises.   She is performing exercises ad kb throughout the day also. She pushes herself with therapy. She was left up in chair with needs in reach. Slow progress. Will continue with POC. SUBJECTIVE:   Ms. Dominguez Solano states, \"it's a little easier today. \"    SOCIAL HISTORY/ LIVING ENVIRONMENT: Lives with two adult sons (who work during the day)  In a single level residence with 3 steps and B handrail to enter. At baseline, patient is a modified independent  ambulator with use of RW. Patient endorses balance impairment at baseline as well as decreased functional   Activity tolerance secondary to chronic low back pain.       OBJECTIVE:     PAIN: VITAL SIGNS: LINES/DRAINS:   Pre Treatment:    Post Treatment: not rated   IV and Purewick  O2 Device: Room air     MOBILITY: I Mod I S SBA CGA Min Mod Max Total  NT x2 Comments:   Bed Mobility    Rolling [] [] [] [] [] [] [] [] [] [] []    Supine to Sit [] [] [] [] [x] [] [] [] [] [] []    Scooting [] [] [] [x] [] [] [] [] [] [] []    Sit to Supine [] [] [] [] [] [] [] [] [] [] []    Transfers    Sit to Stand [] [] [] [] [] [x] [x] [] [] [] [x]    Bed to Chair [] [] [] [] [] [x] [x] [] [] [] [x]    Stand to Sit [] [] [] [] [] [x] [x] [] [] [] []    I=Independent, Mod I=Modified Independent, S=Supervision, SBA=Standby Assistance, CGA=Contact Guard Assistance,   Min=Minimal Assistance, Mod=Moderate Assistance, Max=Maximal Assistance, Total=Total Assistance, NT=Not Tested    BALANCE: Good Fair+ Fair Fair- Poor NT Comments   Sitting Static [x] [] [] [] [] []    Sitting Dynamic [] [x] [] [] [] []              Standing Static [] [] [x] [] [] []    Standing Dynamic [] [] [x] [] [] []      GAIT: I Mod I S SBA CGA Min Mod Max Total  NT x2 Comments:   Level of Assistance [] [] [] [] [] [x] [x] [] [] [] [] SBA x 1 also for safety   Distance 3' to chair    DME Rolling Walker    Gait Quality Decreased step length on contralateral (R LE), antalgic, decreased step clearance bilaterally    Weightbearing  Status WBAT I=Independent, Mod I=Modified Independent, S=Supervision, SBA=Standby Assistance, CGA=Contact Guard Assistance,   Min=Minimal Assistance, Mod=Moderate Assistance, Max=Maximal Assistance, Total=Total Assistance, NT=Not Tested    PLAN:   FREQUENCY/DURATION: PT Plan of Care: BID for duration of hospital stay or until stated goals are met, whichever comes first.  TREATMENT:     TREATMENT:   ($$ Therapeutic Activity: 8-22 mins  $$ Therapeutic Exercises: 8-22 mins    )  Therapeutic Activity (15 Minutes): Therapeutic activity included Supine to Sit, Scooting, Transfer Training, Sitting balance  and Standing balance to improve functional Mobility, Strength and Activity tolerance. Therapeutic Exercise (8 Minutes): Therapeutic exercises noted below to improve functional activity tolerance, AROM and strength.      TREATMENT GRID:     Date:  4/8/21 Date:  4/9/21 Date:     ACTIVITY/EXERCISE AM PM AM PM AM PM   Ankle pumps X 15 B  X 15 B      LAQ X 15 B, AA-L  X 15 L A-AA      Hip fleixon X 15 B, AA-L  X 15 L A-AA      Hip abd X 15 B, AA-L  X 15 L A-AA                                 B = bilateral; AA = active assistive; A = active; P = passive    AFTER TREATMENT POSITION/PRECAUTIONS:  Chair, Needs within reach and RN notified    INTERDISCIPLINARY COLLABORATION:  RN/PCT and PT/PTA    TOTAL TREATMENT DURATION:  PT Patient Time In/Time Out  Time In: 1100  Time Out: [address], PTA

## 2021-04-10 PROCEDURE — 97530 THERAPEUTIC ACTIVITIES: CPT

## 2021-04-10 PROCEDURE — 74011250637 HC RX REV CODE- 250/637: Performed by: ORTHOPAEDIC SURGERY

## 2021-04-10 PROCEDURE — 65270000029 HC RM PRIVATE

## 2021-04-10 PROCEDURE — 97110 THERAPEUTIC EXERCISES: CPT

## 2021-04-10 RX ADMIN — OXYCODONE 5 MG: 5 TABLET ORAL at 09:39

## 2021-04-10 RX ADMIN — Medication 10 ML: at 05:20

## 2021-04-10 RX ADMIN — OXYCODONE 10 MG: 5 TABLET ORAL at 18:38

## 2021-04-10 RX ADMIN — Medication 10 ML: at 21:11

## 2021-04-10 RX ADMIN — LISINOPRIL AND HYDROCHLOROTHIAZIDE 2 TABLET: 12.5; 1 TABLET ORAL at 09:36

## 2021-04-10 RX ADMIN — ASPIRIN 325 MG: 325 TABLET, COATED ORAL at 09:36

## 2021-04-10 RX ADMIN — Medication 10 ML: at 21:12

## 2021-04-10 RX ADMIN — ACETAMINOPHEN 650 MG: 325 TABLET ORAL at 04:30

## 2021-04-10 RX ADMIN — OXYCODONE 5 MG: 5 TABLET ORAL at 15:31

## 2021-04-10 NOTE — PROGRESS NOTES
ACUTE PHYSICAL THERAPY GOALS:  (Developed with and agreed upon by patient and/or caregiver.)  ST. Patient will perform bed mobility with CONTACT GUARD ASSISTANCE within 3 days. 2. Patient will transfer bed to chair with MODERATE ASSISTANCE x1 within 3 days. 3. Patient will demonstrate FAIR STATIC STANDING balance within 3 day(s). 4. Patient will tolerate 15+ minutes of therapeutic activity/exercise and/or neuromuscular re-education while maintaining stable vitals to improve functional strength and activity tolerance within 3 days.     LT. Patient will perform bed mobility with STAND BY ASSISTANCE within 7 days. 2. Patient will transfer bed to chair with MINIMAL ASSISTANCE x1 within 7 days. 3. Patient will demonstrate FAIR DYNAMIC STANDING balance within 7 day(s). .  4. Patient will tolerate 25+ minutes of therapeutic activity/exercise and/or neuromuscular re-education while maintaining stable vitals to improve functional strength and activity tolerance within 7 days. PHYSICAL THERAPY: Daily Note and AM Treatment Day # 4     WBAT for transfers only    Ayaka Pond is a 64 y.o. female   PRIMARY DIAGNOSIS: <principal problem not specified>  Femur fracture, left ([facility]) [S72.92XA]  Closed fracture of left distal femur ([facility]) [S72.402A]  Procedure(s) (LRB):  FEMUR INSERTION RETROGRADE  NAIL LEFT Elizabeth (Left)  4 Days Post-Op    ASSESSMENT:     REHAB RECOMMENDATIONS: CURRENT LEVEL OF FUNCTION:  (Most Recently Demonstrated)   Recommendation to date pending progress:  Setting:   Short-term Rehab  Equipment:    To Be Determined Bed Mobility:  Contact Guard Assistance  Sit to Stand:   Minimal Assistance   Transfers:   Minimal Assistance to mod assist   Gait/Mobility:   Minimal Assistance to mod assist x 1     ASSESSMENT:  Ms. Misti Patel is supine in bed, very agreeable to therapy. She worked on bed mobility, standing, transfers, and UE/LE exercises as below.   She is performing exercises ad kb throughout the day also and she pushes herself with therapy. She is doing better with transfers and requiring less assistance. She was left up in chair with needs in reach. Slow progress. Will continue with POC. SUBJECTIVE:   Ms. Willard Narayanan states, \"it's a little easier today. \"    SOCIAL HISTORY/ LIVING ENVIRONMENT: Lives with two adult sons (who work during the day)  In a single level residence with 3 steps and B handrail to enter. At baseline, patient is a modified independent  ambulator with use of RW. Patient endorses balance impairment at baseline as well as decreased functional   Activity tolerance secondary to chronic low back pain.       OBJECTIVE:     PAIN: VITAL SIGNS: LINES/DRAINS:   Pre Treatment:    Post Treatment: not rated   IV and Purewick  O2 Device: Room air     MOBILITY: I Mod I S SBA CGA Min Mod Max Total  NT x2 Comments:   Bed Mobility    Rolling [] [] [] [] [] [] [] [] [] [] []    Supine to Sit [] [] [] [x] [] [] [] [] [] [] [] + additional time   Scooting [] [] [] [x] [] [] [] [] [] [] []    Sit to Supine [] [] [] [] [] [] [] [] [] [] []    Transfers    Sit to Stand [] [] [] [] [] [x] [] [] [] [] []    Bed to Chair [] [] [] [] [] [x] [x] [] [] [] []    Stand to Sit [] [] [] [] [] [x] [] [] [] [] []    I=Independent, Mod I=Modified Independent, S=Supervision, SBA=Standby Assistance, CGA=Contact Guard Assistance,   Min=Minimal Assistance, Mod=Moderate Assistance, Max=Maximal Assistance, Total=Total Assistance, NT=Not Tested    BALANCE: Good Fair+ Fair Fair- Poor NT Comments   Sitting Static [x] [] [] [] [] []    Sitting Dynamic [] [x] [] [] [] []              Standing Static [] [] [x] [] [] []    Standing Dynamic [] [] [x] [] [] []      GAIT: I Mod I S SBA CGA Min Mod Max Total  NT x2 Comments:   Level of Assistance [] [] [] [] [] [x] [x] [] [] [] []    Distance 2' to chair    DME Rolling Walker    Gait Quality Decreased step length on contralateral (R LE), antalgic, decreased step clearance bilaterally    Weightbearing  Status WBAT     I=Independent, Mod I=Modified Independent, S=Supervision, SBA=Standby Assistance, CGA=Contact Guard Assistance,   Min=Minimal Assistance, Mod=Moderate Assistance, Max=Maximal Assistance, Total=Total Assistance, NT=Not Tested    PLAN:   FREQUENCY/DURATION: PT Plan of Care: BID for duration of hospital stay or until stated goals are met, whichever comes first.  TREATMENT:     TREATMENT:   ($$ Therapeutic Activity: 8-22 mins  $$ Therapeutic Exercises: 8-22 mins    )  Therapeutic Activity (15 Minutes): Therapeutic activity included Supine to Sit, Scooting, Transfer Training, Sitting balance  and Standing balance to improve functional Mobility, Strength and Activity tolerance. Therapeutic Exercise (10 Minutes): Therapeutic exercises noted below to improve functional activity tolerance, AROM and strength.      TREATMENT GRID:     Date:  4/8/21 Date:  4/9/21 Date:  4/10/21   ACTIVITY/EXERCISE AM PM AM PM AM PM   Ankle pumps X 15 B  X 15 B  X 20 B    LAQ X 15 B, AA-L  X 15 L A-AA  X 15 L    Hip fleixon X 15 B, AA-L  X 15 L A-AA  X 15 L    Hip abd X 15 B, AA-L  X 15 L A-AA  X 15 L AA    UE ROM with yellow T-band     5 sets x 7-10 reps ea                      B = bilateral; AA = active assistive; A = active; P = passive    AFTER TREATMENT POSITION/PRECAUTIONS:  Chair, Needs within reach and RN notified    INTERDISCIPLINARY COLLABORATION:  RN/PCT and PT/PTA    TOTAL TREATMENT DURATION:  PT Patient Time In/Time Out  Time In: 1020  Time Out: [address], PTA

## 2021-04-10 NOTE — PROGRESS NOTES
Problem: Falls - Risk of  Goal: *Absence of Falls  Description: Document Bonifacio Moore Fall Risk and appropriate interventions in the flowsheet.   Outcome: Progressing Towards Goal  Note: Fall Risk Interventions:            Medication Interventions: Bed/chair exit alarm, Patient to call before getting OOB, Teach patient to arise slowly    Elimination Interventions: Call light in reach, Patient to call for help with toileting needs, Toileting schedule/hourly rounds    History of Falls Interventions: Bed/chair exit alarm, Consult care management for discharge planning, Door open when patient unattended         Problem: Patient Education: Go to Patient Education Activity  Goal: Patient/Family Education  Outcome: Progressing Towards Goal     Problem: Patient Education: Go to Patient Education Activity  Goal: Patient/Family Education  Outcome: Progressing Towards Goal     Problem: Lower Extremity Fracture:Day of Admission  Goal: Off Pathway (Use only if patient is Off Pathway)  Outcome: Progressing Towards Goal  Goal: Activity/Safety  Outcome: Progressing Towards Goal  Goal: Consults, if ordered  Outcome: Progressing Towards Goal  Goal: Diagnostic Test/Procedures  Outcome: Progressing Towards Goal  Goal: Nutrition/Diet  Outcome: Progressing Towards Goal  Goal: Medications  Outcome: Progressing Towards Goal  Goal: Respiratory  Outcome: Progressing Towards Goal  Goal: Treatments/Interventions/Procedures  Outcome: Progressing Towards Goal  Goal: Psychosocial  Outcome: Progressing Towards Goal  Goal: *Optimal pain control at patient's stated goal  Outcome: Progressing Towards Goal  Goal: *Hemodynamically stable  Outcome: Progressing Towards Goal  Goal: *Adequate oxygenation  Outcome: Progressing Towards Goal     Problem: Lower Extremity Fracture:Day of Surgery ([facility] SCIP Measures for Post-op Care)  Goal: Off Pathway (Use only if patient is Off Pathway)  Outcome: Progressing Towards Goal  Goal: Activity/Safety  Outcome: Progressing Towards Goal  Goal: Consults, if ordered  Outcome: Progressing Towards Goal  Goal: Diagnostic Test/Procedures  Outcome: Progressing Towards Goal  Goal: Nutrition/Diet  Outcome: Progressing Towards Goal  Goal: Medications  Outcome: Progressing Towards Goal  Goal: Respiratory  Outcome: Progressing Towards Goal  Goal: Treatments/Interventions/Procedures  Outcome: Progressing Towards Goal  Goal: Psychosocial  Outcome: Progressing Towards Goal  Goal: *Optimal pain control at patient's stated goal  Outcome: Progressing Towards Goal  Goal: *Hemodynamically stable  Outcome: Progressing Towards Goal  Goal: *Adequate oxygenation  Outcome: Progressing Towards Goal     Problem: Lower Extremity Fracture:Post-op Day 1  Goal: Off Pathway (Use only if patient is Off Pathway)  Outcome: Progressing Towards Goal  Goal: Activity/Safety  Outcome: Progressing Towards Goal  Goal: Consults, if ordered  Outcome: Progressing Towards Goal  Goal: Diagnostic Test/Procedures  Outcome: Progressing Towards Goal  Goal: Nutrition/Diet  Outcome: Progressing Towards Goal  Goal: Discharge Planning  Outcome: Progressing Towards Goal  Goal: Medications  Outcome: Progressing Towards Goal  Goal: Respiratory  Outcome: Progressing Towards Goal  Goal: Treatments/Interventions/Procedures  Outcome: Progressing Towards Goal  Goal: Psychosocial  Outcome: Progressing Towards Goal  Goal: *Optimal pain control at patient's stated goal  Outcome: Progressing Towards Goal  Goal: *Hemodynamically stable  Outcome: Progressing Towards Goal  Goal: *Adequate oxygenation  Outcome: Progressing Towards Goal  Goal: *PT/INR within defined limits  Outcome: Progressing Towards Goal  Goal: *Demonstrates progressive activity  Outcome: Progressing Towards Goal     Problem: Lower Extremity Fracture:Post-op Day 2  Goal: Off Pathway (Use only if patient is Off Pathway)  Outcome: Progressing Towards Goal  Goal: Activity/Safety  Outcome: Progressing Towards Goal  Goal: Diagnostic Test/Procedures  Outcome: Progressing Towards Goal  Goal: Nutrition/Diet  Outcome: Progressing Towards Goal  Goal: Discharge Planning  Outcome: Progressing Towards Goal  Goal: Medications  Outcome: Progressing Towards Goal  Goal: Respiratory  Outcome: Progressing Towards Goal  Goal: Treatments/Interventions/Procedures  Outcome: Progressing Towards Goal  Goal: Psychosocial  Outcome: Progressing Towards Goal  Goal: *Optimal pain control at patient's stated goal  Outcome: Progressing Towards Goal  Goal: *Hemodynamically stable  Outcome: Progressing Towards Goal  Goal: *Adequate oxygenation  Outcome: Progressing Towards Goal  Goal: *PT/INR within defined limits  Outcome: Progressing Towards Goal  Goal: *Demonstrates progressive activity  Outcome: Progressing Towards Goal  Goal: *Voiding  Outcome: Progressing Towards Goal  Goal: *Bowel movement  Outcome: Progressing Towards Goal  Goal: *Tolerating diet  Outcome: Progressing Towards Goal     Problem: Lower Extremity Fracture:Post-op Day 3  Goal: Off Pathway (Use only if patient is Off Pathway)  Outcome: Progressing Towards Goal  Goal: Activity/Safety  Outcome: Progressing Towards Goal  Goal: Diagnostic Test/Procedures  Outcome: Progressing Towards Goal  Goal: Nutrition/Diet  Outcome: Progressing Towards Goal  Goal: Discharge Planning  Outcome: Progressing Towards Goal  Goal: Medications  Outcome: Progressing Towards Goal  Goal: Respiratory  Outcome: Progressing Towards Goal  Goal: Treatments/Interventions/Procedures  Outcome: Progressing Towards Goal  Goal: Psychosocial  Outcome: Progressing Towards Goal  Goal: *Optimal pain control at patient's stated goal  Outcome: Progressing Towards Goal  Goal: *Hemodynamically stable  Outcome: Progressing Towards Goal  Goal: *Adequate oxygenation  Outcome: Progressing Towards Goal  Goal: *PT/INR within defined limits  Outcome: Progressing Towards Goal  Goal: *Demonstrates progressive activity  Outcome: Progressing Towards Goal  Goal: *Voiding  Outcome: Progressing Towards Goal  Goal: *Bowel movement  Outcome: Progressing Towards Goal  Goal: *Tolerating diet  Outcome: Progressing Towards Goal     Problem: Lower Extremity Fracture:Post-op Day 4  Goal: Off Pathway (Use only if patient is Off Pathway)  Outcome: Progressing Towards Goal  Goal: Activity/Safety  Outcome: Progressing Towards Goal  Goal: Diagnostic Test/Procedures  Outcome: Progressing Towards Goal  Goal: Nutrition/Diet  Outcome: Progressing Towards Goal  Goal: Discharge Planning  Outcome: Progressing Towards Goal  Goal: Medications  Outcome: Progressing Towards Goal  Goal: Respiratory  Outcome: Progressing Towards Goal  Goal: Treatments/Interventions/Procedures  Outcome: Progressing Towards Goal  Goal: Psychosocial  Outcome: Progressing Towards Goal     Problem: Lower Extremity Fracture:Discharge Outcomes  Goal: *Hemodynamically stable  Outcome: Progressing Towards Goal  Goal: *Modified independence with transfers, ambulation on levels with assistance devices, stair climbing, ADL's  Outcome: Progressing Towards Goal  Goal: *Independent with active exercises  Outcome: Progressing Towards Goal  Goal: *Describes follow-up/return visits to physicians  Outcome: Progressing Towards Goal  Goal: *Tolerating diet  Outcome: Progressing Towards Goal  Goal: *Optimal pain control at patient's stated goal  Outcome: Progressing Towards Goal  Goal: *Adequate air exchange  Outcome: Progressing Towards Goal  Goal: *Lungs clear or at baseline  Outcome: Progressing Towards Goal  Goal: *Afebrile  Outcome: Progressing Towards Goal  Goal: *Incision intact without signs of infection, redness, warmth  Outcome: Progressing Towards Goal  Goal: *Absence of deep venous thrombosis signs and symptoms(Stroke Metric)  Outcome: Progressing Towards Goal  Goal: *Active bowel function  Outcome: Progressing Towards Goal  Goal: *Adequate urinary output  Outcome: Progressing Towards Goal  Goal: *Discharge anxiety minimal  Outcome: Progressing Towards Goal  Goal: *Describes available resources and support systems  Outcome: Progressing Towards Goal

## 2021-04-10 NOTE — PROGRESS NOTES
[address] FRACTURE PROGRESS NOTE    April 10, 2021  Admit Date:   2021    Post Op day: 4 Days Post-Op    Subjective:    Federico Webster is in bed. No complaints.       PT/OT:   Gait:   progressing                  Vital Signs:    Patient Vitals for the past 8 hrs:   BP Temp Pulse Resp SpO2   04/10/21 0719 125/69 98 °F (36.7 °C) 90 18 92 %   04/10/21 0422 118/68 98.5 °F (36.9 °C) 95 18 94 %     Temp (24hrs), Av.4 °F (36.9 °C), Min:97.6 °F (36.4 °C), Max:99 °F (37.2 °C)      Pain Control:   Pain Assessment  Pain Scale 1: Numeric (0 - 10)  Pain Intensity 1: 5  Pain Onset 1: postop  Pain Location 1: Leg  Pain Orientation 1: Left  Pain Description 1: Burning  Pain Intervention(s) 1: Medication (see MAR)    Meds:    Current Facility-Administered Medications   Medication Dose Route Frequency    sodium chloride (NS) flush 5-40 mL  5-40 mL IntraVENous Q8H    sodium chloride (NS) flush 5-40 mL  5-40 mL IntraVENous PRN    polyethylene glycol (MIRALAX) packet 17 g  17 g Oral DAILY PRN    oxyCODONE IR (ROXICODONE) tablet 5-10 mg  5-10 mg Oral Q4H PRN    lisinopril-hydroCHLOROthiazide (PRINZIDE, ZESTORETIC) 10-12.5 mg per tablet 2 Tab  2 Tab Oral DAILY    sodium chloride (NS) flush 5-40 mL  5-40 mL IntraVENous Q8H    sodium chloride (NS) flush 5-40 mL  5-40 mL IntraVENous PRN    HYDROmorphone (DILAUDID) injection 0.5 mg  0.5 mg IntraVENous Q2H PRN    sodium chloride (NS) flush 5-40 mL  5-40 mL IntraVENous Q8H    sodium chloride (NS) flush 5-40 mL  5-40 mL IntraVENous PRN    acetaminophen (TYLENOL) tablet 650 mg  650 mg Oral Q6H PRN    Or    acetaminophen (TYLENOL) suppository 650 mg  650 mg Rectal Q6H PRN    polyethylene glycol (MIRALAX) packet 17 g  17 g Oral DAILY PRN    promethazine (PHENERGAN) tablet 12.5 mg  12.5 mg Oral Q6H PRN    Or    ondansetron (ZOFRAN) injection 4 mg  4 mg IntraVENous Q6H PRN    aspirin delayed-release tablet 325 mg  325 mg Oral DAILY       LAB:    No results for input(s): HCT, HCTEXT, HGB, HGBEXT, INR, INREXT, HCTEXT, HGBEXT, INREXT in the last 72 hours. 24 Hour Assessment Issues:    Oriented    Discharge Planning: Inpatient Rehab    Transfuse PRBC's:      Assessment & Physician's Comment:  Dressing CDI. LE warm and pink. Good cap refill.      Active Problems:    Femur fracture, left ([address] (4/6/2021)      Closed fracture of left distal femur ([facility] [address] (4/6/2021)        Plan:  Continue PT/OT  Dispo: rehab when bed approved      Pedro Mitchell NP

## 2021-04-11 PROCEDURE — 74011250637 HC RX REV CODE- 250/637: Performed by: ORTHOPAEDIC SURGERY

## 2021-04-11 PROCEDURE — 65270000029 HC RM PRIVATE

## 2021-04-11 PROCEDURE — 97110 THERAPEUTIC EXERCISES: CPT

## 2021-04-11 PROCEDURE — 97530 THERAPEUTIC ACTIVITIES: CPT

## 2021-04-11 PROCEDURE — 77030038269 HC DRN EXT URIN PURWCK BARD -A

## 2021-04-11 RX ADMIN — Medication 10 ML: at 21:30

## 2021-04-11 RX ADMIN — OXYCODONE 10 MG: 5 TABLET ORAL at 09:07

## 2021-04-11 RX ADMIN — POLYETHYLENE GLYCOL 3350 17 G: 17 POWDER, FOR SOLUTION ORAL at 10:17

## 2021-04-11 RX ADMIN — LISINOPRIL AND HYDROCHLOROTHIAZIDE 2 TABLET: 12.5; 1 TABLET ORAL at 09:07

## 2021-04-11 RX ADMIN — ASPIRIN 325 MG: 325 TABLET, COATED ORAL at 09:07

## 2021-04-11 RX ADMIN — Medication 10 ML: at 05:30

## 2021-04-11 RX ADMIN — OXYCODONE 5 MG: 5 TABLET ORAL at 18:16

## 2021-04-11 RX ADMIN — Medication 10 ML: at 05:31

## 2021-04-11 NOTE — PROGRESS NOTES
Problem: Falls - Risk of  Goal: *Absence of Falls  Description: Document Authur Senters Fall Risk and appropriate interventions in the flowsheet.   Outcome: Progressing Towards Goal  Note: Fall Risk Interventions:            Medication Interventions: Bed/chair exit alarm    Elimination Interventions: Bed/chair exit alarm, Call light in reach    History of Falls Interventions: Bed/chair exit alarm

## 2021-04-11 NOTE — PROGRESS NOTES
[address] FRACTURE PROGRESS NOTE    2021  Admit Date:   2021    Post Op day: 5 Days Post-Op    Subjective:    Emma Shaw is in bed. No complaints.       PT/OT:   Gait:   progressing                  Vital Signs:    Patient Vitals for the past 8 hrs:   BP Temp Pulse Resp SpO2   21 0751 117/61 98.3 °F (36.8 °C) 89 18 91 %     Temp (24hrs), Av.3 °F (36.8 °C), Min:98.1 °F (36.7 °C), Max:98.6 °F (37 °C)      Pain Control:   Pain Assessment  Pain Scale 1: Numeric (0 - 10)  Pain Intensity 1: 6  Pain Onset 1: p;ost op  Pain Location 1: Leg  Pain Orientation 1: Left  Pain Description 1: Aching  Pain Intervention(s) 1: Medication (see MAR)    Meds:    Current Facility-Administered Medications   Medication Dose Route Frequency    sodium chloride (NS) flush 5-40 mL  5-40 mL IntraVENous Q8H    sodium chloride (NS) flush 5-40 mL  5-40 mL IntraVENous PRN    polyethylene glycol (MIRALAX) packet 17 g  17 g Oral DAILY PRN    oxyCODONE IR (ROXICODONE) tablet 5-10 mg  5-10 mg Oral Q4H PRN    lisinopril-hydroCHLOROthiazide (PRINZIDE, ZESTORETIC) 10-12.5 mg per tablet 2 Tab  2 Tab Oral DAILY    sodium chloride (NS) flush 5-40 mL  5-40 mL IntraVENous Q8H    sodium chloride (NS) flush 5-40 mL  5-40 mL IntraVENous PRN    HYDROmorphone (DILAUDID) injection 0.5 mg  0.5 mg IntraVENous Q2H PRN    sodium chloride (NS) flush 5-40 mL  5-40 mL IntraVENous Q8H    sodium chloride (NS) flush 5-40 mL  5-40 mL IntraVENous PRN    acetaminophen (TYLENOL) tablet 650 mg  650 mg Oral Q6H PRN    Or    acetaminophen (TYLENOL) suppository 650 mg  650 mg Rectal Q6H PRN    polyethylene glycol (MIRALAX) packet 17 g  17 g Oral DAILY PRN    promethazine (PHENERGAN) tablet 12.5 mg  12.5 mg Oral Q6H PRN    Or    ondansetron (ZOFRAN) injection 4 mg  4 mg IntraVENous Q6H PRN    aspirin delayed-release tablet 325 mg  325 mg Oral DAILY       LAB:    No results for input(s): HCT, HCTEXT, HGB, HGBEXT, INR, INREXT, HCTEXT, HGBEXT, INREXT, HCTEXT, HGBEXT, INREXT in the last 72 hours. 24 Hour Assessment Issues:    Oriented    Discharge Planning: Inpatient Rehab    Transfuse PRBC's:      Assessment & Physician's Comment:  Dressing CDI. LE warm and pink. Good cap refill.      Active Problems:    Femur fracture, left ([facility] [address] (4/6/2021)      Closed fracture of left distal femur ([facility] [address] (4/6/2021)        Plan:  Continue PT/OT  Dispo: rehab when bed approved      Ofelia Menard MD

## 2021-04-11 NOTE — PROGRESS NOTES
ACUTE PHYSICAL THERAPY GOALS:  (Developed with and agreed upon by patient and/or caregiver.)  ST. Patient will perform bed mobility with CONTACT GUARD ASSISTANCE within 3 days. 2. Patient will transfer bed to chair with MODERATE ASSISTANCE x1 within 3 days. 3. Patient will demonstrate FAIR STATIC STANDING balance within 3 day(s). 4. Patient will tolerate 15+ minutes of therapeutic activity/exercise and/or neuromuscular re-education while maintaining stable vitals to improve functional strength and activity tolerance within 3 days.     LT. Patient will perform bed mobility with STAND BY ASSISTANCE within 7 days. 2. Patient will transfer bed to chair with MINIMAL ASSISTANCE x1 within 7 days. 3. Patient will demonstrate FAIR DYNAMIC STANDING balance within 7 day(s). .  4. Patient will tolerate 25+ minutes of therapeutic activity/exercise and/or neuromuscular re-education while maintaining stable vitals to improve functional strength and activity tolerance within 7 days. PHYSICAL THERAPY: Daily Note and AM Treatment Day # 5     WBAT for transfers only    Jesse Barriga is a 64 y.o. female   PRIMARY DIAGNOSIS: <principal problem not specified>  Femur fracture, left ([facility]) [S72.92XA]  Closed fracture of left distal femur ([facility]) [S72.402A]  Procedure(s) (LRB):  FEMUR INSERTION RETROGRADE  NAIL LEFT Lynn (Left)  5 Days Post-Op    ASSESSMENT:     REHAB RECOMMENDATIONS: CURRENT LEVEL OF FUNCTION:  (Most Recently Demonstrated)   Recommendation to date pending progress:  Setting:   Short-term Rehab  Equipment:    To Be Determined Bed Mobility:  Standby Assistance and additional time  Sit to Stand:  Minimal Assistance to CGA  Transfers:   Minimal Assistance to CGA  Gait/Mobility:   Not tested      ASSESSMENT:  Ms. Jeane Saint is supine in bed, very agreeable to therapy. She worked on bed mobility, standing, transfers, and UE/LE exercises as below.   She is performing exercises ad kb throughout the day also and she pushes herself with therapy. She required less assistance with her transfer today. Good progress towards goals. Will continue with POC. SUBJECTIVE:   Ms. Jacqueline Gold states, \"I'm trying to do all I can. \"    SOCIAL HISTORY/ LIVING ENVIRONMENT: Lives with two adult sons (who work during the day)  In a single level residence with 3 steps and B handrail to enter. At baseline, patient is a modified independent  ambulator with use of RW. Patient endorses balance impairment at baseline as well as decreased functional   Activity tolerance secondary to chronic low back pain.       OBJECTIVE:     PAIN: VITAL SIGNS: LINES/DRAINS:   Pre Treatment:    Post Treatment: not rated   IV and Purewick  O2 Device: Room air     MOBILITY: I Mod I S SBA CGA Min Mod Max Total  NT x2 Comments:   Bed Mobility    Rolling [] [] [] [] [] [] [] [] [] [] []    Supine to Sit [] [] [] [x] [] [] [] [] [] [] [] + additional time   Scooting [] [] [] [x] [] [] [] [] [] [] []    Sit to Supine [] [] [] [] [] [] [] [] [] [] []    Transfers    Sit to Stand [] [] [] [] [] [x] [] [] [] [] []    Bed to Chair [] [] [] [] [x] [x] [] [] [] [] []    Stand to Sit [] [] [] [] [] [x] [] [] [] [] []    I=Independent, Mod I=Modified Independent, S=Supervision, SBA=Standby Assistance, CGA=Contact Guard Assistance,   Min=Minimal Assistance, Mod=Moderate Assistance, Max=Maximal Assistance, Total=Total Assistance, NT=Not Tested    BALANCE: Good Fair+ Fair Fair- Poor NT Comments   Sitting Static [x] [] [] [] [] []    Sitting Dynamic [] [x] [] [] [] []              Standing Static [] [] [x] [] [] []    Standing Dynamic [] [] [x] [] [] []      GAIT: I Mod I S SBA CGA Min Mod Max Total  NT x2 Comments:   Level of Assistance [] [] [] [] [] [] [] [] [] [] []    Distance     DME Rolling Walker    Gait Quality     Weightbearing  Status WBAT     I=Independent, Mod I=Modified Independent, S=Supervision, SBA=Standby Assistance, CGA=Contact Guard Assistance,   Min=Minimal Assistance, Mod=Moderate Assistance, Max=Maximal Assistance, Total=Total Assistance, NT=Not Tested    PLAN:   FREQUENCY/DURATION: PT Plan of Care: BID for duration of hospital stay or until stated goals are met, whichever comes first.  TREATMENT:     TREATMENT:   ($$ Therapeutic Activity: 8-22 mins  $$ Therapeutic Exercises: 8-22 mins    )  Therapeutic Activity (15 Minutes): Therapeutic activity included Supine to Sit, Scooting, Transfer Training, Sitting balance  and Standing balance to improve functional Mobility, Strength and Activity tolerance. Therapeutic Exercise (10 Minutes): Therapeutic exercises noted below to improve functional activity tolerance, AROM and strength.      TREATMENT GRID:     Date:  4/8/21 Date:  4/9/21 Date:  4/10/21 Date:  4/11/21   ACTIVITY/EXERCISE AM PM AM PM AM PM    Ankle pumps X 15 B  X 15 B  X 20 B  X 20 B   LAQ X 15 B, AA-L  X 15 L A-AA  X 15 L  X 15 L   Hip fleixon X 15 B, AA-L  X 15 L A-AA  X 15 L  X 15 L   Hip abd X 15 B, AA-L  X 15 L A-AA  X 15 L AA  X 15 L AA   UE ROM with yellow T-band     5 sets x 7-10 reps ea  5 sets x 7-10 reps ea                       B = bilateral; AA = active assistive; A = active; P = passive    AFTER TREATMENT POSITION/PRECAUTIONS:  Chair, Needs within reach and RN notified    INTERDISCIPLINARY COLLABORATION:  RN/PCT and PT/PTA    TOTAL TREATMENT DURATION:  PT Patient Time In/Time Out  Time In: 1018  Time Out: 5 [facility], PTA

## 2021-04-11 NOTE — ROUTINE PROCESS
Bedside and Verbal shift change report given to myself (oncoming nurse) by Bryan Arellano RN (offgoing nurse). Report included the following information SBAR, Kardex, MAR and Recent Results.

## 2021-04-11 NOTE — PROGRESS NOTES
Problem: Falls - Risk of  Goal: *Absence of Falls  Description: Document Carol Posada Fall Risk and appropriate interventions in the flowsheet.   Outcome: Progressing Towards Goal  Note: Fall Risk Interventions:  Mobility Interventions: OT consult for ADLs, Patient to call before getting OOB, PT Consult for mobility concerns         Medication Interventions: Patient to call before getting OOB, Teach patient to arise slowly    Elimination Interventions: Call light in reach, Patient to call for help with toileting needs, Toileting schedule/hourly rounds    History of Falls Interventions: Bed/chair exit alarm, Consult care management for discharge planning, Door open when patient unattended         Problem: Patient Education: Go to Patient Education Activity  Goal: Patient/Family Education  Outcome: Progressing Towards Goal     Problem: Patient Education: Go to Patient Education Activity  Goal: Patient/Family Education  Outcome: Progressing Towards Goal     Problem: Lower Extremity Fracture:Day of Admission  Goal: Off Pathway (Use only if patient is Off Pathway)  Outcome: Progressing Towards Goal  Goal: Activity/Safety  Outcome: Progressing Towards Goal  Goal: Consults, if ordered  Outcome: Progressing Towards Goal  Goal: Diagnostic Test/Procedures  Outcome: Progressing Towards Goal  Goal: Nutrition/Diet  Outcome: Progressing Towards Goal  Goal: Medications  Outcome: Progressing Towards Goal  Goal: Respiratory  Outcome: Progressing Towards Goal  Goal: Treatments/Interventions/Procedures  Outcome: Progressing Towards Goal  Goal: Psychosocial  Outcome: Progressing Towards Goal  Goal: *Optimal pain control at patient's stated goal  Outcome: Progressing Towards Goal  Goal: *Hemodynamically stable  Outcome: Progressing Towards Goal  Goal: *Adequate oxygenation  Outcome: Progressing Towards Goal     Problem: Lower Extremity Fracture:Day of Surgery ([facility]iate SCIP Measures for Post-op Care)  Goal: Off Pathway (Use only if patient is Off Pathway)  Outcome: Progressing Towards Goal  Goal: Activity/Safety  Outcome: Progressing Towards Goal  Goal: Consults, if ordered  Outcome: Progressing Towards Goal  Goal: Diagnostic Test/Procedures  Outcome: Progressing Towards Goal  Goal: Nutrition/Diet  Outcome: Progressing Towards Goal  Goal: Medications  Outcome: Progressing Towards Goal  Goal: Respiratory  Outcome: Progressing Towards Goal  Goal: Treatments/Interventions/Procedures  Outcome: Progressing Towards Goal  Goal: Psychosocial  Outcome: Progressing Towards Goal  Goal: *Optimal pain control at patient's stated goal  Outcome: Progressing Towards Goal  Goal: *Hemodynamically stable  Outcome: Progressing Towards Goal  Goal: *Adequate oxygenation  Outcome: Progressing Towards Goal     Problem: Lower Extremity Fracture:Post-op Day 1  Goal: Off Pathway (Use only if patient is Off Pathway)  Outcome: Progressing Towards Goal  Goal: Activity/Safety  Outcome: Progressing Towards Goal  Goal: Consults, if ordered  Outcome: Progressing Towards Goal  Goal: Diagnostic Test/Procedures  Outcome: Progressing Towards Goal  Goal: Nutrition/Diet  Outcome: Progressing Towards Goal  Goal: Discharge Planning  Outcome: Progressing Towards Goal  Goal: Medications  Outcome: Progressing Towards Goal  Goal: Respiratory  Outcome: Progressing Towards Goal  Goal: Treatments/Interventions/Procedures  Outcome: Progressing Towards Goal  Goal: Psychosocial  Outcome: Progressing Towards Goal  Goal: *Optimal pain control at patient's stated goal  Outcome: Progressing Towards Goal  Goal: *Hemodynamically stable  Outcome: Progressing Towards Goal  Goal: *Adequate oxygenation  Outcome: Progressing Towards Goal  Goal: *PT/INR within defined limits  Outcome: Progressing Towards Goal  Goal: *Demonstrates progressive activity  Outcome: Progressing Towards Goal     Problem: Lower Extremity Fracture:Post-op Day 2  Goal: Off Pathway (Use only if patient is Off Pathway)  Outcome: Progressing Towards Goal  Goal: Activity/Safety  Outcome: Progressing Towards Goal  Goal: Diagnostic Test/Procedures  Outcome: Progressing Towards Goal  Goal: Nutrition/Diet  Outcome: Progressing Towards Goal  Goal: Discharge Planning  Outcome: Progressing Towards Goal  Goal: Medications  Outcome: Progressing Towards Goal  Goal: Respiratory  Outcome: Progressing Towards Goal  Goal: Treatments/Interventions/Procedures  Outcome: Progressing Towards Goal  Goal: Psychosocial  Outcome: Progressing Towards Goal  Goal: *Optimal pain control at patient's stated goal  Outcome: Progressing Towards Goal  Goal: *Hemodynamically stable  Outcome: Progressing Towards Goal  Goal: *Adequate oxygenation  Outcome: Progressing Towards Goal  Goal: *PT/INR within defined limits  Outcome: Progressing Towards Goal  Goal: *Demonstrates progressive activity  Outcome: Progressing Towards Goal  Goal: *Voiding  Outcome: Progressing Towards Goal  Goal: *Bowel movement  Outcome: Progressing Towards Goal  Goal: *Tolerating diet  Outcome: Progressing Towards Goal     Problem: Lower Extremity Fracture:Post-op Day 3  Goal: Off Pathway (Use only if patient is Off Pathway)  Outcome: Progressing Towards Goal  Goal: Activity/Safety  Outcome: Progressing Towards Goal  Goal: Diagnostic Test/Procedures  Outcome: Progressing Towards Goal  Goal: Nutrition/Diet  Outcome: Progressing Towards Goal  Goal: Discharge Planning  Outcome: Progressing Towards Goal  Goal: Medications  Outcome: Progressing Towards Goal  Goal: Respiratory  Outcome: Progressing Towards Goal  Goal: Treatments/Interventions/Procedures  Outcome: Progressing Towards Goal  Goal: Psychosocial  Outcome: Progressing Towards Goal  Goal: *Optimal pain control at patient's stated goal  Outcome: Progressing Towards Goal  Goal: *Hemodynamically stable  Outcome: Progressing Towards Goal  Goal: *Adequate oxygenation  Outcome: Progressing Towards Goal  Goal: *PT/INR within defined limits  Outcome: Progressing Towards Goal  Goal: *Demonstrates progressive activity  Outcome: Progressing Towards Goal  Goal: *Voiding  Outcome: Progressing Towards Goal  Goal: *Bowel movement  Outcome: Progressing Towards Goal  Goal: *Tolerating diet  Outcome: Progressing Towards Goal     Problem: Lower Extremity Fracture:Post-op Day 4  Goal: Off Pathway (Use only if patient is Off Pathway)  Outcome: Progressing Towards Goal  Goal: Activity/Safety  Outcome: Progressing Towards Goal  Goal: Diagnostic Test/Procedures  Outcome: Progressing Towards Goal  Goal: Nutrition/Diet  Outcome: Progressing Towards Goal  Goal: Discharge Planning  Outcome: Progressing Towards Goal  Goal: Medications  Outcome: Progressing Towards Goal  Goal: Respiratory  Outcome: Progressing Towards Goal  Goal: Treatments/Interventions/Procedures  Outcome: Progressing Towards Goal  Goal: Psychosocial  Outcome: Progressing Towards Goal     Problem: Lower Extremity Fracture:Discharge Outcomes  Goal: *Hemodynamically stable  Outcome: Progressing Towards Goal  Goal: *Modified independence with transfers, ambulation on levels with assistance devices, stair climbing, ADL's  Outcome: Progressing Towards Goal  Goal: *Independent with active exercises  Outcome: Progressing Towards Goal  Goal: *Describes follow-up/return visits to physicians  Outcome: Progressing Towards Goal  Goal: *Tolerating diet  Outcome: Progressing Towards Goal  Goal: *Optimal pain control at patient's stated goal  Outcome: Progressing Towards Goal  Goal: *Adequate air exchange  Outcome: Progressing Towards Goal  Goal: *Lungs clear or at baseline  Outcome: Progressing Towards Goal  Goal: *Afebrile  Outcome: Progressing Towards Goal  Goal: *Incision intact without signs of infection, redness, warmth  Outcome: Progressing Towards Goal  Goal: *Absence of deep venous thrombosis signs and symptoms(Stroke Metric)  Outcome: Progressing Towards Goal  Goal: *Active bowel function  Outcome: Progressing Towards Goal  Goal: *Adequate urinary output  Outcome: Progressing Towards Goal  Goal: *Discharge anxiety minimal  Outcome: Progressing Towards Goal  Goal: *Describes available resources and support systems  Outcome: Progressing Towards Goal

## 2021-04-12 PROCEDURE — 97110 THERAPEUTIC EXERCISES: CPT

## 2021-04-12 PROCEDURE — 65270000029 HC RM PRIVATE

## 2021-04-12 PROCEDURE — 97530 THERAPEUTIC ACTIVITIES: CPT

## 2021-04-12 PROCEDURE — 74011250637 HC RX REV CODE- 250/637: Performed by: ORTHOPAEDIC SURGERY

## 2021-04-12 PROCEDURE — 77030038269 HC DRN EXT URIN PURWCK BARD -A

## 2021-04-12 RX ADMIN — LISINOPRIL AND HYDROCHLOROTHIAZIDE 2 TABLET: 12.5; 1 TABLET ORAL at 08:29

## 2021-04-12 RX ADMIN — Medication 10 ML: at 05:13

## 2021-04-12 RX ADMIN — ASPIRIN 325 MG: 325 TABLET, COATED ORAL at 08:29

## 2021-04-12 RX ADMIN — OXYCODONE 5 MG: 5 TABLET ORAL at 04:49

## 2021-04-12 RX ADMIN — OXYCODONE 10 MG: 5 TABLET ORAL at 10:32

## 2021-04-12 RX ADMIN — OXYCODONE 10 MG: 5 TABLET ORAL at 17:30

## 2021-04-12 RX ADMIN — Medication 10 ML: at 22:55

## 2021-04-12 RX ADMIN — Medication 10 ML: at 22:56

## 2021-04-12 NOTE — PROGRESS NOTES
Problem: Falls - Risk of  Goal: *Absence of Falls  Description: Document Raji Tomlinson Fall Risk and appropriate interventions in the flowsheet.   Outcome: Progressing Towards Goal  Note: Fall Risk Interventions:  Mobility Interventions: OT consult for ADLs, Patient to call before getting OOB, PT Consult for mobility concerns         Medication Interventions: Patient to call before getting OOB, Teach patient to arise slowly    Elimination Interventions: Call light in reach, Patient to call for help with toileting needs, Toileting schedule/hourly rounds    History of Falls Interventions: Bed/chair exit alarm, Door open when patient unattended, Room close to nurse's station         Problem: Patient Education: Go to Patient Education Activity  Goal: Patient/Family Education  Outcome: Progressing Towards Goal     Problem: Patient Education: Go to Patient Education Activity  Goal: Patient/Family Education  Outcome: Progressing Towards Goal     Problem: Lower Extremity Fracture:Day of Admission  Goal: Off Pathway (Use only if patient is Off Pathway)  Outcome: Progressing Towards Goal  Goal: Activity/Safety  Outcome: Progressing Towards Goal  Goal: Consults, if ordered  Outcome: Progressing Towards Goal  Goal: Diagnostic Test/Procedures  Outcome: Progressing Towards Goal  Goal: Nutrition/Diet  Outcome: Progressing Towards Goal  Goal: Medications  Outcome: Progressing Towards Goal  Goal: Respiratory  Outcome: Progressing Towards Goal  Goal: Treatments/Interventions/Procedures  Outcome: Progressing Towards Goal  Goal: Psychosocial  Outcome: Progressing Towards Goal  Goal: *Optimal pain control at patient's stated goal  Outcome: Progressing Towards Goal  Goal: *Hemodynamically stable  Outcome: Progressing Towards Goal  Goal: *Adequate oxygenation  Outcome: Progressing Towards Goal     Problem: Lower Extremity Fracture:Day of Surgery ([facility] SCIP Measures for Post-op Care)  Goal: Off Pathway (Use only if patient is Off Pathway)  Outcome: Progressing Towards Goal  Goal: Activity/Safety  Outcome: Progressing Towards Goal  Goal: Consults, if ordered  Outcome: Progressing Towards Goal  Goal: Diagnostic Test/Procedures  Outcome: Progressing Towards Goal  Goal: Nutrition/Diet  Outcome: Progressing Towards Goal  Goal: Medications  Outcome: Progressing Towards Goal  Goal: Respiratory  Outcome: Progressing Towards Goal  Goal: Treatments/Interventions/Procedures  Outcome: Progressing Towards Goal  Goal: Psychosocial  Outcome: Progressing Towards Goal  Goal: *Optimal pain control at patient's stated goal  Outcome: Progressing Towards Goal  Goal: *Hemodynamically stable  Outcome: Progressing Towards Goal  Goal: *Adequate oxygenation  Outcome: Progressing Towards Goal     Problem: Lower Extremity Fracture:Post-op Day 1  Goal: Off Pathway (Use only if patient is Off Pathway)  Outcome: Progressing Towards Goal  Goal: Activity/Safety  Outcome: Progressing Towards Goal  Goal: Consults, if ordered  Outcome: Progressing Towards Goal  Goal: Diagnostic Test/Procedures  Outcome: Progressing Towards Goal  Goal: Nutrition/Diet  Outcome: Progressing Towards Goal  Goal: Discharge Planning  Outcome: Progressing Towards Goal  Goal: Medications  Outcome: Progressing Towards Goal  Goal: Respiratory  Outcome: Progressing Towards Goal  Goal: Treatments/Interventions/Procedures  Outcome: Progressing Towards Goal  Goal: Psychosocial  Outcome: Progressing Towards Goal  Goal: *Optimal pain control at patient's stated goal  Outcome: Progressing Towards Goal  Goal: *Hemodynamically stable  Outcome: Progressing Towards Goal  Goal: *Adequate oxygenation  Outcome: Progressing Towards Goal  Goal: *PT/INR within defined limits  Outcome: Progressing Towards Goal  Goal: *Demonstrates progressive activity  Outcome: Progressing Towards Goal     Problem: Lower Extremity Fracture:Post-op Day 2  Goal: Off Pathway (Use only if patient is Off Pathway)  Outcome: Progressing Towards Goal  Goal: Activity/Safety  Outcome: Progressing Towards Goal  Goal: Diagnostic Test/Procedures  Outcome: Progressing Towards Goal  Goal: Nutrition/Diet  Outcome: Progressing Towards Goal  Goal: Discharge Planning  Outcome: Progressing Towards Goal  Goal: Medications  Outcome: Progressing Towards Goal  Goal: Respiratory  Outcome: Progressing Towards Goal  Goal: Treatments/Interventions/Procedures  Outcome: Progressing Towards Goal  Goal: Psychosocial  Outcome: Progressing Towards Goal  Goal: *Optimal pain control at patient's stated goal  Outcome: Progressing Towards Goal  Goal: *Hemodynamically stable  Outcome: Progressing Towards Goal  Goal: *Adequate oxygenation  Outcome: Progressing Towards Goal  Goal: *PT/INR within defined limits  Outcome: Progressing Towards Goal  Goal: *Demonstrates progressive activity  Outcome: Progressing Towards Goal  Goal: *Voiding  Outcome: Progressing Towards Goal  Goal: *Bowel movement  Outcome: Progressing Towards Goal  Goal: *Tolerating diet  Outcome: Progressing Towards Goal     Problem: Lower Extremity Fracture:Post-op Day 3  Goal: Off Pathway (Use only if patient is Off Pathway)  Outcome: Progressing Towards Goal  Goal: Activity/Safety  Outcome: Progressing Towards Goal  Goal: Diagnostic Test/Procedures  Outcome: Progressing Towards Goal  Goal: Nutrition/Diet  Outcome: Progressing Towards Goal  Goal: Discharge Planning  Outcome: Progressing Towards Goal  Goal: Medications  Outcome: Progressing Towards Goal  Goal: Respiratory  Outcome: Progressing Towards Goal  Goal: Treatments/Interventions/Procedures  Outcome: Progressing Towards Goal  Goal: Psychosocial  Outcome: Progressing Towards Goal  Goal: *Optimal pain control at patient's stated goal  Outcome: Progressing Towards Goal  Goal: *Hemodynamically stable  Outcome: Progressing Towards Goal  Goal: *Adequate oxygenation  Outcome: Progressing Towards Goal  Goal: *PT/INR within defined limits  Outcome: Progressing Towards Goal  Goal: *Demonstrates progressive activity  Outcome: Progressing Towards Goal  Goal: *Voiding  Outcome: Progressing Towards Goal  Goal: *Bowel movement  Outcome: Progressing Towards Goal  Goal: *Tolerating diet  Outcome: Progressing Towards Goal     Problem: Lower Extremity Fracture:Post-op Day 4  Goal: Off Pathway (Use only if patient is Off Pathway)  Outcome: Progressing Towards Goal  Goal: Activity/Safety  Outcome: Progressing Towards Goal  Goal: Diagnostic Test/Procedures  Outcome: Progressing Towards Goal  Goal: Nutrition/Diet  Outcome: Progressing Towards Goal  Goal: Discharge Planning  Outcome: Progressing Towards Goal  Goal: Medications  Outcome: Progressing Towards Goal  Goal: Respiratory  Outcome: Progressing Towards Goal  Goal: Treatments/Interventions/Procedures  Outcome: Progressing Towards Goal  Goal: Psychosocial  Outcome: Progressing Towards Goal     Problem: Lower Extremity Fracture:Discharge Outcomes  Goal: *Hemodynamically stable  Outcome: Progressing Towards Goal  Goal: *Modified independence with transfers, ambulation on levels with assistance devices, stair climbing, ADL's  Outcome: Progressing Towards Goal  Goal: *Independent with active exercises  Outcome: Progressing Towards Goal  Goal: *Describes follow-up/return visits to physicians  Outcome: Progressing Towards Goal  Goal: *Tolerating diet  Outcome: Progressing Towards Goal  Goal: *Optimal pain control at patient's stated goal  Outcome: Progressing Towards Goal  Goal: *Adequate air exchange  Outcome: Progressing Towards Goal  Goal: *Lungs clear or at baseline  Outcome: Progressing Towards Goal  Goal: *Afebrile  Outcome: Progressing Towards Goal  Goal: *Incision intact without signs of infection, redness, warmth  Outcome: Progressing Towards Goal  Goal: *Absence of deep venous thrombosis signs and symptoms(Stroke Metric)  Outcome: Progressing Towards Goal  Goal: *Active bowel function  Outcome: Progressing Towards Goal  Goal: *Adequate urinary output  Outcome: Progressing Towards Goal  Goal: *Discharge anxiety minimal  Outcome: Progressing Towards Goal  Goal: *Describes available resources and support systems  Outcome: Progressing Towards Goal

## 2021-04-12 NOTE — PROGRESS NOTES
ACUTE PHYSICAL THERAPY GOALS:  (Developed with and agreed upon by patient and/or caregiver.)  ST. Patient will perform bed mobility with CONTACT GUARD ASSISTANCE within 3 days. 2. Patient will transfer bed to chair with MODERATE ASSISTANCE x1 within 3 days. 3. Patient will demonstrate FAIR STATIC STANDING balance within 3 day(s). 4. Patient will tolerate 15+ minutes of therapeutic activity/exercise and/or neuromuscular re-education while maintaining stable vitals to improve functional strength and activity tolerance within 3 days.     LT. Patient will perform bed mobility with STAND BY ASSISTANCE within 7 days. 2. Patient will transfer bed to chair with MINIMAL ASSISTANCE x1 within 7 days. 3. Patient will demonstrate FAIR DYNAMIC STANDING balance within 7 day(s). .  4. Patient will tolerate 25+ minutes of therapeutic activity/exercise and/or neuromuscular re-education while maintaining stable vitals to improve functional strength and activity tolerance within 7 days. PHYSICAL THERAPY: Daily Note and AM Treatment Day # 6     WBAT for transfers only    Amil Burkitt is a 64 y.o. female   PRIMARY DIAGNOSIS: <principal problem not specified>  Femur fracture, left ([facility]) [S72.92XA]  Closed fracture of left distal femur ([facility]) [S72.402A]  Procedure(s) (LRB):  FEMUR INSERTION RETROGRADE  NAIL LEFT [place] (Left)  6 Days Post-Op    ASSESSMENT:     REHAB RECOMMENDATIONS: CURRENT LEVEL OF FUNCTION:  (Most Recently Demonstrated)   Recommendation to date pending progress:  Setting:   Short-term Rehab  Equipment:    To Be Determined Bed Mobility:  Standby Assistance   Sit to Stand:  Contact Guard Assistance   Transfers:  [employer] Guard Assistance   Gait/Mobility:   Not tested      ASSESSMENT:  Ms. Omid Jack continues to make slow progress toward goals and demonstrated increased independence with mobility. Bed mobility and transfers with SBA/CGA and SBA for 2' w/ RW.        SUBJECTIVE:   Ms. Omid Jack states, \"I've got to  Have some income. \"    SOCIAL HISTORY/ LIVING ENVIRONMENT: Lives with two adult sons (who work during the day)  In a single level residence with 3 steps and B handrail to enter. At baseline, patient is a modified independent  ambulator with use of RW. Patient endorses balance impairment at baseline as well as decreased functional   Activity tolerance secondary to chronic low back pain.       OBJECTIVE:     PAIN: VITAL SIGNS: LINES/DRAINS:   Pre Treatment: Pain Screen  Pain Scale 1: Numeric (0 - 10)  Pain Intensity 1: 0  Post Treatment: 0      O2 Device: None (Room air)     MOBILITY: I Mod I S SBA CGA Min Mod Max Total  NT x2 Comments:   Bed Mobility    Rolling [] [] [] [] [] [] [] [] [] [] []    Supine to Sit [] [] [] [x] [] [] [] [] [] [] [] + additional time   Scooting [] [] [] [x] [] [] [] [] [] [] []    Sit to Supine [] [] [] [] [] [] [] [] [] [] []    Transfers    Sit to Stand [] [] [] [] [x] [] [] [] [] [] []    Bed to Chair [] [] [] [] [x] [x] [] [] [] [] []    Stand to Sit [] [] [] [] [x] [] [] [] [] [] []    I=Independent, Mod I=Modified Independent, S=Supervision, SBA=Standby Assistance, CGA=Contact Guard Assistance,   Min=Minimal Assistance, Mod=Moderate Assistance, Max=Maximal Assistance, Total=Total Assistance, NT=Not Tested    BALANCE: Good Fair+ Fair Fair- Poor NT Comments   Sitting Static [x] [] [] [] [] []    Sitting Dynamic [] [x] [] [] [] []              Standing Static [] [] [x] [] [] []    Standing Dynamic [] [] [x] [] [] []      GAIT: I Mod I S SBA CGA Min Mod Max Total  NT x2 Comments:   Level of Assistance [] [] [] [] [x] [] [] [] [] [] []    Distance 2'    DME Rolling Walker    Gait Quality     Weightbearing  Status WBAT     I=Independent, Mod I=Modified Independent, S=Supervision, SBA=Standby Assistance, CGA=Contact Guard Assistance,   Min=Minimal Assistance, Mod=Moderate Assistance, Max=Maximal Assistance, Total=Total Assistance, NT=Not Tested    PLAN:   FREQUENCY/DURATION: PT Plan of Care: BID for duration of hospital stay or until stated goals are met, whichever comes first.  TREATMENT:     TREATMENT:   ($$ Therapeutic Activity: 8-22 mins    )  Therapeutic Activity (16 Minutes): Therapeutic activity included Supine to Sit, Scooting, Transfer Training, Sitting balance  and Standing balance to improve functional Mobility, Strength and Activity tolerance.     TREATMENT GRID:     Date:  4/8/21 Date:  4/9/21 Date:  4/10/21 Date:  4/11/21   ACTIVITY/EXERCISE AM PM AM PM AM PM    Ankle pumps X 15 B  X 15 B  X 20 B  X 20 B   LAQ X 15 B, AA-L  X 15 L A-AA  X 15 L  X 15 L   Hip fleixon X 15 B, AA-L  X 15 L A-AA  X 15 L  X 15 L   Hip abd X 15 B, AA-L  X 15 L A-AA  X 15 L AA  X 15 L AA   UE ROM with yellow T-band     5 sets x 7-10 reps ea  5 sets x 7-10 reps ea                       B = bilateral; AA = active assistive; A = active; P = passive    AFTER TREATMENT POSITION/PRECAUTIONS:  Chair, Needs within reach and RN notified    INTERDISCIPLINARY COLLABORATION:  RN/PCT and PT/PTA    TOTAL TREATMENT DURATION:  PT Patient Time In/Time Out  Time In: 1104  Time Out: 1201 Zeferino Jason, PTA

## 2021-04-12 NOTE — DISCHARGE INSTRUCTIONS
[facility]    IF YOU HAVE ANY PROBLEMS ONCE YOU ARE AT HOME CALL THE FOLLOWING NUMBERS:   Main office number: [phone] ask for Ritika Mari (medical assistant with Dr. Zain Burciaga)  Office Address: [facility] [address] [address] [address] [address] , [address] [facility]      Patient Discharge Instructions    Antoni Cano / [id number] : 1960    Admitted 2021 Discharged: 2021         To be given to [address] on Admission         Weight bearing status: As tolerated for transfers only on the left     Activity  · Continue Physical Therapy and Occupational Therapy   · Fall precautions     Wound Care   Dry dressing changes using sterile technique every other day or more frequently if needed    Staples are to be left in and removed in our office 2 weeks postop    Diet  · Resume regular or diabetic diet      Medications    · Patient medications are listed on the medication reconciliation sheet. Follow up    Follow up in our office in 2 weeks postop    All patients are to be transported via stretcher unless they are able to independently get out of a chair and stand without assistance. Information obtained by :  I understand that if any problems occur once I am at home I am to contact my physician. I understand and acknowledge receipt of the instructions indicated above.                                                                                                                                            Physician's or R.N.'s Signature                                                                  Date/Time                                                                                                                                              Patient or Representative Signature                                                          Date/Time

## 2021-04-12 NOTE — PROGRESS NOTES
ACUTE OT GOALS:  (Developed with and agreed upon by patient and/or caregiver.)  1. Patient will complete lower body bathing and dressing with Min A and adaptive equipment as needed. 2. Patient will complete toileting with Min A and adaptive equipment as needed. 3. Patient will tolerate 23 minutes of OT treatment with 1-2 rest breaks to increase activity tolerance for performance of ADLs. 4. Patient will complete functional transfers from sit to stand with CGA and adaptive equipment as needed. 5. Patient will complete SPT from bed to chair with Min A and adaptive equipment as needed. 6. Patient will complete BUE exercises to increase strength in RUE to at least a 4/5 for performance of ADLs and functional transfers.      Timeframe: 7 visits     OCCUPATIONAL THERAPY: Daily Note OT Treatment Day # 3   WBAT for TRANSFERS ONLY MAURY Suazo is a 64 y.o. female   PRIMARY DIAGNOSIS: <principal problem not specified>  Femur fracture, left ([facility]) [S72.92XA]  Closed fracture of left distal femur ([facility]) [S72.402A]  Procedure(s) (LRB):  FEMUR INSERTION RETROGRADE  NAIL LEFT [place] (Left)  6 Days Post-Op  Payor: /   ASSESSMENT:     REHAB RECOMMENDATIONS: CURRENT LEVEL OF FUNCTION:  (Most Recently Demonstrated)   Recommendation to date pending progress:  Setting:   Short-term Rehab  Equipment:    To Be Determined Bathing:   Not tested  Dressing:   Not tested  Feeding/Grooming:   Not tested  Toileting:   Not tested  Functional Mobility:   Not tested      ASSESSMENT:  Ms. Adriane Munoz is doing well today. Pt performed UE exercises to increase strength and activity tolerance. Pt making some progress with goals. No OOB activities performed due to patient waiting on PT. Will continue to benefit from skilled OT during stay. SUBJECTIVE:   Ms. Adriane Munoz states, \"My right shoulder is bothering me\"    SOCIAL HISTORY/LIVING ENVIRONMENT: Pt lives with two sons (both working second shift) in one level home with 3 YUNG.  Pt is typically Mod I for ADLs and Mod I for functional mobility with use of RW or SPC. Pt works full-time at [employer] and reports use of RW or cane throughout the day. Pt drives.      OBJECTIVE:     PAIN: VITAL SIGNS: LINES/DRAINS:   Pre Treatment: Pain Screen  Pain Scale 1: Numeric (0 - 10)  Pain Intensity 1: 0  Post Treatment: Unchanged   Gama Catheter  O2 Device: None (Room air)     ACTIVITIES OF DAILY LIVING: I Mod I S SBA CGA Min Mod Max Total NT Comments   BASIC ADLs:              Bathing/ Showering [] [] [] [] [] [] [] [] [] [x]    Toileting [] [] [] [] [] [] [] [] [] [x]    Dressing [] [] [] [] [] [] [] [] [] [x]    Feeding [] [] [] [] [] [] [] [] [] [x]    Grooming [] [] [] [] [] [] [] [] [] [x]    Personal Device Care [] [] [] [] [] [] [] [] [] [x]    Functional Mobility [] [] [] [] [] [] [] [] [] [x]    I=Independent, Mod I=Modified Independent, S=Supervision, SBA=Standby Assistance, CGA=Contact Guard Assistance,   Min=Minimal Assistance, Mod=Moderate Assistance, Max=Maximal Assistance, Total=Total Assistance, NT=Not Tested    MOBILITY: I Mod I S SBA CGA Min Mod Max Total  NT x2 Comments:   Supine to sit [] [] [] [] [] [] [] [] [] [x] []    Sit to supine [] [] [] [] [] [] [] [] [] [x] []    Sit to stand [] [] [] [] [] [] [] [] [] [x] []    Bed to chair [] [] [] [] [] [] [] [] [] [x] []    I=Independent, Mod I=Modified Independent, S=Supervision, SBA=Standby Assistance, CGA=Contact Guard Assistance,   Min=Minimal Assistance, Mod=Moderate Assistance, Max=Maximal Assistance, Total=Total Assistance, NT=Not Tested    BALANCE: Good Fair+ Fair Fair- Poor NT Comments   Sitting Static [] [x] [] [] [] []    Sitting Dynamic [] [] [x] [] [] []              Standing Static [] [] [] [] [] [x]    Standing Dynamic [] [] [] [] [] [x]      PLAN:   FREQUENCY/DURATION: OT Plan of Care: 3 times/week for duration of hospital stay or until stated goals are met, whichever comes first.    TREATMENT:   TREATMENT: Therapeutic Exercise (15 Minutes): Therapeutic exercises noted below to improve functional activity tolerance, strength and mobility.      TREATMENT GRID:   Date:  4/8/21 Date:  4/12/21 Date:     Activity/Exercise Parameters Parameters Parameters   Shoulder Abd/Adduction 10 reps 20 reps with yellow theraband    Shoulder Flexion 10 reps 10 reps with 2# dowel    Elbow Flexion 10 reps 15 reps with 2# dowel    Punches 10 reps 15 reps with RUE only (theraband)    Scapular Retraction  20 reps with yellow theraband    Shoulder Extension  15 reps with yellow theraband            AFTER TREATMENT POSITION/PRECAUTIONS:  Chair, Needs within reach and RN notified    INTERDISCIPLINARY COLLABORATION:  RN/PCT and OT/COFFMAN    TOTAL TREATMENT DURATION:  OT Patient Time In/Time Out  Time In: 1035  Time Out: Diomedes 38    Salima Berg

## 2021-04-12 NOTE — PROGRESS NOTES
Message sent to facility liaison for update. They had no update from . Call placed to [facility] for worker's comp and per liaison, patient has been denied for worker's comp claim. Facility liaison updated. Patient is self pay and CM anticipates that patient will need home health services. Patient has been followed by [facility] and has been provided a sena application. Attending notified.

## 2021-04-12 NOTE — PROGRESS NOTES
ACUTE PHYSICAL THERAPY GOALS:  (Developed with and agreed upon by patient and/or caregiver.)  ST. Patient will perform bed mobility with CONTACT GUARD ASSISTANCE within 3 days. 2. Patient will transfer bed to chair with MODERATE ASSISTANCE x1 within 3 days. 3. Patient will demonstrate FAIR STATIC STANDING balance within 3 day(s). 4. Patient will tolerate 15+ minutes of therapeutic activity/exercise and/or neuromuscular re-education while maintaining stable vitals to improve functional strength and activity tolerance within 3 days.     LT. Patient will perform bed mobility with STAND BY ASSISTANCE within 7 days. 2. Patient will transfer bed to chair with MINIMAL ASSISTANCE x1 within 7 days. 3. Patient will demonstrate FAIR DYNAMIC STANDING balance within 7 day(s). .  4. Patient will tolerate 25+ minutes of therapeutic activity/exercise and/or neuromuscular re-education while maintaining stable vitals to improve functional strength and activity tolerance within 7 days. PHYSICAL THERAPY: Daily Note and PM Treatment Day # 6     WBAT for transfers only    Jesse Barriga is a 64 y.o. female   PRIMARY DIAGNOSIS: <principal problem not specified>  Femur fracture, left ([facility]) [S72.92XA]  Closed fracture of left distal femur ([facility]) [S72.402A]  Procedure(s) (LRB):  FEMUR INSERTION RETROGRADE  NAIL LEFT [place] (Left)  6 Days Post-Op    ASSESSMENT:     REHAB RECOMMENDATIONS: CURRENT LEVEL OF FUNCTION:  (Most Recently Demonstrated)   Recommendation to date pending progress:  Setting:   Short-term Rehab  Equipment:    To Be Determined Bed Mobility:  Standby Assistance   Sit to Stand:  Contact Guard Assistance   Transfers:  [employer] Guard Assistance   Gait/Mobility:   Not tested      ASSESSMENT:  Ms. Jeane Saint was not ready to get back to bed this PM, so focused on exercises. Good participation and demonstrating increased strength with LLE. Needs STR due to limitations from WB status.         SUBJECTIVE:   Ms. Alli Cortez states, \"My son just upset me\"    SOCIAL HISTORY/ LIVING ENVIRONMENT: Lives with two adult sons (who work during the day)  In a single level residence with 3 steps and B handrail to enter. At baseline, patient is a modified independent  ambulator with use of RW. Patient endorses balance impairment at baseline as well as decreased functional   Activity tolerance secondary to chronic low back pain.       OBJECTIVE:     PAIN: VITAL SIGNS: LINES/DRAINS:   Pre Treatment: Pain Screen  Pain Scale 1: Numeric (0 - 10)  Pain Intensity 1: 0  Post Treatment: 0      O2 Device: None (Room air)     MOBILITY: I Mod I S SBA CGA Min Mod Max Total  NT x2 Comments:   Bed Mobility    Rolling [] [] [] [] [] [] [] [] [] [x] []    Supine to Sit [] [] [] [] [] [] [] [] [] [x] [] + additional time   Scooting [] [] [] [] [] [] [] [] [] [x] []    Sit to Supine [] [] [] [] [] [] [] [] [] [x] []    Transfers    Sit to Stand [] [] [] [] [] [] [] [] [] [x] []    Bed to Chair [] [] [] [] [] [] [] [] [] [x] []    Stand to Sit [] [] [] [] [] [] [] [] [] [x] []    I=Independent, Mod I=Modified Independent, S=Supervision, SBA=Standby Assistance, CGA=Contact Guard Assistance,   Min=Minimal Assistance, Mod=Moderate Assistance, Max=Maximal Assistance, Total=Total Assistance, NT=Not Tested    BALANCE: Good Fair+ Fair Fair- Poor NT Comments   Sitting Static [] [] [] [] [] [x]    Sitting Dynamic [] [] [] [] [] [x]              Standing Static [] [] [] [] [] [x]    Standing Dynamic [] [] [] [] [] [x]      GAIT: I Mod I S SBA CGA Min Mod Max Total  NT x2 Comments:   Level of Assistance [] [] [] [] [] [] [] [] [] [x] []    Distance     DME Rolling Walker    Gait Quality     Weightbearing  Status WBAT     I=Independent, Mod I=Modified Independent, S=Supervision, SBA=Standby Assistance, CGA=Contact Guard Assistance,   Min=Minimal Assistance, Mod=Moderate Assistance, Max=Maximal Assistance, Total=Total Assistance, NT=Not Tested    PLAN:   FREQUENCY/DURATION: PT Plan of Care: BID for duration of hospital stay or until stated goals are met, whichever comes first.  TREATMENT:     TREATMENT:   ($$ Therapeutic Activity: 8-22 mins  $$ Therapeutic Exercises: 23-37 mins    )  Therapeutic Exercise (28 Minutes): Therapeutic exercises noted below to improve functional activity tolerance, AROM, strength and mobility.      TREATMENT GRID:     Date:  4/11/21 4/12/21     ACTIVITY/EXERCISE       Ankle pumps X 20 B x20     LAQ X 15 L 2x15     Hip fleixon X 15 L 2x15     Hip abd X 15 L AA x10     UE ROM with yellow T-band 5 sets x 7-10 reps ea      marching  2x10            B = bilateral; AA = active assistive; A = active; P = passive    AFTER TREATMENT POSITION/PRECAUTIONS:  Chair, Needs within reach and RN notified    INTERDISCIPLINARY COLLABORATION:  RN/PCT and PT/PTA    TOTAL TREATMENT DURATION:  PT Patient Time In/Time Out  Time In: 1357  Time Out: [facility], PTA

## 2021-04-12 NOTE — PROGRESS NOTES
2021         Post Op day: 6 Days Post-Op Procedure(s) (LRB):  FEMUR INSERTION RETROGRADE  NAIL LEFT [place] (Left)      Admit Date: 2021  Admit Diagnosis: Femur fracture, left ([facility]) [S72.92XA]  Closed fracture of left distal femur ([facility]) [S72.402A]       Principle Problem: <principal problem not specified>. Subjective: Doing well, No complaints, No SOB, No Chest Pain, No Nausea or Vomiting     Objective:   Vital Signs are Stable, No Acute Distress, Alert,  Dressing is Dry,  Neurovascular exam is normal.     Assessment / Plan :  Patient Active Problem List   Diagnosis Code    Hypokalemia E87.6    HTN (hypertension), malignant I10    Arthritis M19.90    Morbidly obese ([address] E66.01    Adrenal adenoma, right D35.01    SIRS (systemic inflammatory response syndrome) ([facility]) R65.10    Constipation K59.00    Nausea and vomiting R11.2    Osteomyelitis of spine ([facility]) M46.20    Femur fracture, left ([facility] S72. 92XA    Closed fracture of left distal femur ([address] S72.402A      Patient Vitals for the past 8 hrs:   BP Temp Pulse Resp SpO2   21 0418 112/70 98.4 °F (36.9 °C) 91 18 94 %   21 0025 123/70 98.4 °F (36.9 °C) 91 18 94 %    Temp (24hrs), Av.2 °F (36.8 °C), Min:97.5 °F (36.4 °C), Max:98.5 °F (36.9 °C)    Body mass index is 44.1 kg/m².     Lab Results   Component Value Date/Time    HGB 10.1 (L) 2021 04:28 AM        S/P ORIF left distal femur    Medical Mgmt per hospitalist  Anticoagulation plan: ASA 325mg daily  Continue PT: WBAT for transfers only on left   Fall Precautions  DC disp: rehab placement   F/U: 2 weeks postop for wound check and staple removal        Signed By: TOM Anguiano  2021,  8:09 AM

## 2021-04-13 ENCOUNTER — HOME HEALTH ADMISSION (OUTPATIENT)
Dept: HOME HEALTH SERVICES | Facility: HOME HEALTH | Age: 61
End: 2021-04-13

## 2021-04-13 VITALS
DIASTOLIC BLOOD PRESSURE: 67 MMHG | HEART RATE: 82 BPM | BODY MASS INDEX: 44.15 KG/M2 | HEIGHT: 65 IN | SYSTOLIC BLOOD PRESSURE: 105 MMHG | RESPIRATION RATE: 17 BRPM | OXYGEN SATURATION: 91 % | WEIGHT: 265 LBS | TEMPERATURE: 98 F

## 2021-04-13 PROCEDURE — 97164 PT RE-EVAL EST PLAN CARE: CPT

## 2021-04-13 PROCEDURE — 2709999900 HC NON-CHARGEABLE SUPPLY

## 2021-04-13 PROCEDURE — 77030038269 HC DRN EXT URIN PURWCK BARD -A

## 2021-04-13 PROCEDURE — 74011250637 HC RX REV CODE- 250/637: Performed by: ORTHOPAEDIC SURGERY

## 2021-04-13 PROCEDURE — 97530 THERAPEUTIC ACTIVITIES: CPT

## 2021-04-13 RX ORDER — FACIAL-BODY WIPES
10 EACH TOPICAL DAILY PRN
Status: DISCONTINUED | OUTPATIENT
Start: 2021-04-13 | End: 2021-04-13 | Stop reason: HOSPADM

## 2021-04-13 RX ADMIN — OXYCODONE 10 MG: 5 TABLET ORAL at 08:05

## 2021-04-13 RX ADMIN — ASPIRIN 325 MG: 325 TABLET, COATED ORAL at 08:05

## 2021-04-13 RX ADMIN — Medication 10 ML: at 06:07

## 2021-04-13 RX ADMIN — LISINOPRIL AND HYDROCHLOROTHIAZIDE 2 TABLET: 12.5; 1 TABLET ORAL at 08:05

## 2021-04-13 NOTE — PROGRESS NOTES
Patient states that she has not had a bowel movement.   Patient offered miralax but declined  States that she will take something when she gets home

## 2021-04-13 NOTE — PROGRESS NOTES
Transport delayed per Eze Roberts until 1:30. Therapist in to confirm appropriate levels on walker and BSC. Therapist reports to CM that patient states she has not had a BM in > 7 Days. Attending messaged.

## 2021-04-13 NOTE — PROGRESS NOTES
ACUTE PHYSICAL THERAPY GOALS:  (Developed with and agreed upon by patient and/or caregiver.)  UPDATED ON RE-ASSESSMENT 4/13/2021  1. Patient will perform bed mobility with MODIFIED INDEPENDENCEwithin 3 days. 2. Patient will transfer sit to stand with MODIFIED INDEPDENCE within 3 days. 3.  bed to chair with MODIFIED INDEPENDENCE utilizing RW within 3 days. 4. Patient will demonstrate GOOD DYNAMIC STANDING balance within 3 day(s). 5. Patient will tolerate 25+ minutes of therapeutic activity/exercise and/or neuromuscular re-education while maintaining stable vitals to improve functional strength and activity tolerance within 7 days.       PHYSICAL THERAPY ASSESSMENT: Daily Note, Re-evaluation and AM PT Treatment Day # 1     WBAT for transfers only      Efra Kaplan is a 64 y.o. female   PRIMARY DIAGNOSIS: <principal problem not specified>  Femur fracture, left ([facility]) [S72.92XA]  Closed fracture of left distal femur ([facility]) [S72.402A]  Procedure(s) (LRB):  FEMUR INSERTION RETROGRADE  NAIL LEFT [place] (Left)  7 Days Post-Op  Reason for Referral:    ICD-10: Treatment Diagnosis: Difficulty in walking, Not elsewhere classified (R26.2)  INPATIENT: Payor: /     ASSESSMENT:     REHAB RECOMMENDATIONS:   Recommendation to date pending progress:  Setting:   Recommend STR; plan is HHPT  Equipment:    3 in 1 Bedside Commode   Rolling Walker    (Medically necessary for safe mobility)     PRIOR LEVEL OF FUNCTION:  (Prior to Hospitalization) INITIAL/CURRENT LEVEL OF FUNCTION:  (Most Recently Demonstrated)   Bed Mobility:   Modified Independent  Sit to Stand:   Modified Independent  Transfers:   Modified Independent  Gait/Mobility:   Modified Independent Bed Mobility:   Standby Assistance  Sit to Stand:   Contact Guard Assistance  Transfers:   Contact Guard Assistance  Gait/Mobility:   Unable to perform (WBAT for transfers only)     ASSESSMENT:  Ms. Dominguez Solano is a 64year old female admitted s/p fall with resultant left femoral fracture. Patient is 7 day s/p left femoral IM nailing and is WBAT for transfers only. Patient is seen this AM for physical therapy re-assessment to address progression toward discharge goals. Patient has met 7/8 goals set on initial evaluation. Continues to demonstrate decreased functional mobility from modified independent baseline. Currently requiring SBA-CGA with bed mobility and transfers. Addressed transfer training with rollator and patient required moderate assistance from therapist. Jarred Jyothi is safest DME to utilize for this patient during transfers. Goals updated to reflect patient progress; will continue with BID plan of care during acute stay. Recommend STR at discharge; appreciate plan is home with home health services. Recommend RW and 3-in-1 BSC for home use (medicall necessary for safe mobility). Recommendations and DME discussed with patient and RN Case Manager. Also educated patient on DME safety, transfer set up, and home safety with teach back, verbalized understanding. DME adjusted to correct heights for patient use at home. SUBJECTIVE:   Ms. Marychuy Marcial states, \"The other walker does is better. \"    SOCIAL HISTORY/LIVING ENVIRONMENT: Lives with two adult sons (who work during the day)  In a single level residence with 3 steps and B handrail to enter. At baseline, patient is a modified independent  ambulator with use of RW. Patient endorses balance impairment at baseline as well as decreased functional   Activity tolerance secondary to chronic low back pain.       OBJECTIVE:     PAIN: VITAL SIGNS: LINES/DRAINS:   Pre Treatment: Pain Screen  Pain Scale 1: Numeric (0 - 10)  Pain Intensity 1: 0  Post Treatment:0/10   IV  O2 Device: None (Room air)     GROSS EVALUATION:   Within Functional Limits Abnormal/ Functional Abnormal/ Non-Functional (see comments) Not Tested Comments:   AROM [] [x] [] [] Post op L LE   PROM [] [] [] [x]    Strength [] [x] [] [] R UE and B LE functionally L>R   Balance [] [x] [] [] Fair dynamic standing   Posture [] [x] [] [] Forward head, rounded shoulders mild   Sensation [x] [] [] [x] Intact light touch distal B LEs   Coordination [] [] [] [x]    Tone [] [] [] [x]    Edema [] [] [] [x]    Activity Tolerance [] [x] [] [] C/o fatigue with transfers    [] [] [] []      COGNITION/  PERCEPTION: Intact Impaired   (see comments) Comments:   Orientation [x] [] Oriented x4   Vision [] [] Not assessed   Hearing [] [] Not assessed   Command Following [x] []    Safety Awareness [] [x] Mild to moderately impulsive; required additional cues for safety    [] []      MOBILITY: I Mod I S SBA CGA Min Mod Max Total  NT x2 Comments:   Bed Mobility    Rolling [] [] [] [] [] [x] [] [] [] [x] []    Supine to Sit [] [] [] [x] [] [] [] [] [] [] []    Scooting [] [] [] [x] [] [] [] [] [] [] []    Sit to Supine [] [] [] [x] [] [] [] [] [] [x] []    Transfers    Sit to Stand [] [] [] [] [x] [] [] [] [] [] []    Bed to Chair [] [] [] [] [x] [] [] [] [] [] [] Mod A with rollator; CGA with RW   Stand to Sit [] [] [] [] [x] [] [] [] [] [] []    I=Independent, Mod I=Modified Independent, S=Supervision, SBA=Standby Assistance, CGA=Contact Guard Assistance,   Min=Minimal Assistance, Mod=Moderate Assistance, Max=Maximal Assistance, Total=Total Assistance, NT=Not Tested  GAIT: I Mod I S SBA CGA Min Mod Max Total  NT x2 Comments:   Level of Assistance [] [] [] [] [] [] [x] [] [] [] [x] Orders for WBAT for transfer only L LE   Distance N/A    DME N/A    Gait Quality N/A    Weightbearing Status WBAT, for transfers only L LE     I=Independent, Mod I=Modified Independent, S=Supervision, SBA=Standby Assistance, CGA=Contact Guard Assistance,   Min=Minimal Assistance, Mod=Moderate Assistance, Max=Maximal Assistance, Total=Total Assistance, NT=Not Tested    [employer] 6 Clicks   Basic Mobility Inpatient Short Form       How much difficulty does the patient currently have. .. Unable A Lot A Little None   1. Turning over in bed (including adjusting bedclothes, sheets and blankets)? [] 1   [] 2   [] 3   [x] 4   2. Sitting down on and standing up from a chair with arms ( e.g., wheelchair, bedside commode, etc.)   [] 1   [] 2   [x] 3   [] 4   3. Moving from lying on back to sitting on the side of the bed? [] 1   [] 2   [] 3   [x] 4   How much help from another person does the patient currently need. .. Total A Lot A Little None   4. Moving to and from a bed to a chair (including a wheelchair)? [] 1   [] 2   [x] 3   [] 4   5. Need to walk in hospital room? [] 1   [x] 2   [] 3   [] 4   6. Climbing 3-5 steps with a railing? [x] 1   [] 2   [] 3   [] 4   © 2007, Trustees of [address] [place] [address], under license to [employer]. All rights reserved     Score:  Initial: 14 Most Recent: 17 (Date: 4/13/2021 )    Interpretation of Tool:  Represents activities that are increasingly more difficult (i.e. Bed mobility, Transfers, Gait). PLAN:   FREQUENCY/DURATION: PT Plan of Care: BID for duration of hospital stay or until stated goals are met, whichever comes first.    PROBLEM LIST:   (Skilled intervention is medically necessary to address:)  1. Decreased ADL/Functional Activities  2. Decreased Activity Tolerance  3. Decreased AROM/PROM  4. Decreased Balance  5. Decreased Gait Ability  6. Decreased Strength  7. Decreased Transfer Abilities  8. Increased Pain   INTERVENTIONS PLANNED:   (Benefits and precautions of physical therapy have been discussed with the patient.)  1. Therapeutic Activity  2. Therapeutic Exercise/HEP  3. Neuromuscular Re-education  4. Gait Training  5. Manual Therapy  6. Education     TREATMENT:     PHYSICAL THERAPY RE-EVALUATION (untimed charge)    TREATMENT:   ($$ Therapeutic Activity: 23-37 mins    )  Therapeutic Activity (24 Minutes):  Therapeutic activity included Supine to Sit, Sit to Supine, Scooting, Transfer Training, Sitting balance  and Standing balance to improve functional Mobility, Strength, ROM and Activity tolerance. TREATMENT GRID:  N/A    AFTER TREATMENT POSITION/PRECAUTIONS:  Chair, Needs within reach and Patient needs met   Educated patient to call for assistance with mobility; verbalized understanding.     INTERDISCIPLINARY COLLABORATION:  RN/PCT, PT/PTA and OT/COFFMAN    TOTAL TREATMENT DURATION:  PT Patient Time In/Time Out  Time In: 1008  Time Out: 29 Shahab Pierre DPT

## 2021-04-13 NOTE — PROGRESS NOTES
Problem: Falls - Risk of  Goal: *Absence of Falls  Description: Document Chelle Broad Fall Risk and appropriate interventions in the flowsheet.   Outcome: Progressing Towards Goal  Note: Fall Risk Interventions:  Mobility Interventions: Patient to call before getting OOB         Medication Interventions: Patient to call before getting OOB    Elimination Interventions: Call light in reach    History of Falls Interventions: Evaluate medications/consider consulting pharmacy

## 2021-04-13 NOTE — PROGRESS NOTES
BYRON confirmed with [employer] representative that patient has been notified by her personally of the denial by worker's comp. BYRON spoke with patient and son, Deanna, who are both in agreement for patient to be discharged to her home address with [address] [place] [address] 951 ambulance service today as she has stairs to enter the home. Transport arranged for 12:30 so that sons can assist patient with safe and proper set up prior to their leaving for work. Therapy has reported to BYRON that patient has practiced transfer techniques and is aware that her rollator is unsafe for use at this time. Patient will be discharged with Rolling walker and BSC per therapy recommendations and they will pay for DME out of pocket in agreement with [facility] notified to call patient, order and referral placed in [address] [place] [address]. Confirmation received from Maxwell, with [facility], that patient has paid for items. Signed consent faxed to [facility] and items delivered to room. [facility] order placed and referral sent for PT/OT/RN/MSW and Aide. Patient was previously seen by [facility] and was discharged on 9/15/2020. In August of 2020, MSW, Jonathon Wong, met with patient at home and per her notes, \"MSW provided information on [facility] Financial sponsorship, which can assist with hospital and Homecare costs. MSW assisted pt in completing application. Pt., provided necessary financial documentation needed for application. MSW will forward for processing. Reminded patient that it make take up to 90 days to receive response. Pt., told MSW that she can wait because she doesn't have anyway to pay the bills. Pt.,has very minimal RX costs. She receives services thru [facility] clinic, therefore has reduced medical costs including medications if needed. Pt., verbalized understanding of information presented. \"  Noted in [facility] note, that they have mailed patient another financial aide application. Primary RN and attending notified of plan for discharge.   Follow up appointment with ortho has been scheduled. Care Management Interventions  PCP Verified by CM: Yes(Nathanael Allen)  Mode of Transport at Discharge: [address]  [address] Time of Discharge: [address] (CM Consult): [address]: Yes  Partner SNF: Yes  Discharge Durable Medical Equipment: Yes(Self Pay [facility] and )  Physical Therapy Consult: Yes  Occupational Therapy Consult: Yes  Speech Therapy Consult: No  Current Support Network: Own Home, Other(Two sons live with patient. )  Confirm Follow Up Transport: Family  The Plan for Transition of Care is Related to the Following Treatment Goals : Patient will partcipate in [facility] therapies in order to return to safe basline independence in ADLs.    The Patient and/or Patient Representative was Provided with a Choice of Provider and Agrees with the Discharge Plan?: Yes  Freedom of Choice List was Provided with Basic Dialogue that Supports the Patient's Individualized Plan of Care/Goals, Treatment Preferences and Shares the Quality Data Associated with the Providers?: Yes  Discharge Location  Discharge Placement: Home with home health

## 2021-04-13 NOTE — PROGRESS NOTES
Prescriptions and discharge instructions reviewed with patient  Patient given the opportunity to ask questions   Patient verbalizes understanding.   IV removed  Patient asked to call nursing station when ride arrives

## 2021-04-13 NOTE — PROGRESS NOTES
Problem: Falls - Risk of  Goal: *Absence of Falls  Description: Document Len Beavers Fall Risk and appropriate interventions in the flowsheet.   4/13/2021 1158 by Angelina Ruelas  Outcome: Resolved/Met  4/13/2021 0942 by Angelina Ruelas  Outcome: Progressing Towards Goal  Note: Fall Risk Interventions:  Mobility Interventions: Patient to call before getting OOB         Medication Interventions: Patient to call before getting OOB    Elimination Interventions: Call light in reach    History of Falls Interventions: Evaluate medications/consider consulting pharmacy         Problem: Patient Education: Go to Patient Education Activity  Goal: Patient/Family Education  Outcome: Resolved/Met     Problem: Patient Education: Go to Patient Education Activity  Goal: Patient/Family Education  Outcome: Resolved/Met     Problem: Lower Extremity Fracture:Day of Admission  Goal: Off Pathway (Use only if patient is Off Pathway)  Outcome: Resolved/Met  Goal: Activity/Safety  Outcome: Resolved/Met  Goal: Consults, if ordered  Outcome: Resolved/Met  Goal: Diagnostic Test/Procedures  Outcome: Resolved/Met  Goal: Nutrition/Diet  Outcome: Resolved/Met  Goal: Medications  Outcome: Resolved/Met  Goal: Respiratory  Outcome: Resolved/Met  Goal: Treatments/Interventions/Procedures  Outcome: Resolved/Met  Goal: Psychosocial  Outcome: Resolved/Met  Goal: *Optimal pain control at patient's stated goal  Outcome: Resolved/Met  Goal: *Hemodynamically stable  Outcome: Resolved/Met  Goal: *Adequate oxygenation  Outcome: Resolved/Met

## 2021-04-13 NOTE — PROGRESS NOTES
Transport was delayed to 1500 as patient was prescribed a Dulcolax suppository. Patient refused and transport confirmed for 1500.

## 2021-04-14 NOTE — PROGRESS NOTES
Physician Progress Note      PATIENT:               Jose Luis Fritz  CSN #:                  [id number]  :                       1960  ADMIT DATE:       2021 11:51 PM  [address] [place] DATE:        2021 4:00 PM  RESPONDING  PROVIDER #:        Qamar LEON PA-C          QUERY TEXT:    Pt admitted with knee pain after her fall. Pt noted to have  Closed displaced left distal femoral shaft fracture  . If possible, please document in progress notes and discharge summary if you are evaluating and/or treating any of the following: The medical record reflects the following:  Risk Factors: Documented significant advanced degenerative changes in her left knee per H&P, Morbid Obesity  Clinical Indicators: Closed displaced left distal femoral shaft fracture, Left knee and thigh pain    Treatment: Open treatment of left femoral shaft fracture with retrograde intramedullary nail fixation      may have. I would be happy to assist.  Thank you. Sally Carr RN CDI, CCS  My office phone [phone]  Options provided:  -- Pathological  left distal femoral shaft fracture  -- Osteoporotic  left distal femoral shaft Fracture  -- Osteoporotic  left distal femoral shaft fracture following fall which would not usually break a normal, healthy bone  -- Traumatic left distal femoral shaft fracture  -- Other - I will add my own diagnosis  -- Disagree - Not applicable / Not valid  -- Disagree - Clinically unable to determine / Unknown  -- Refer to Clinical Documentation Reviewer    PROVIDER RESPONSE TEXT:    This patient has an osteoporotic fracture of left distal femoral shaft following fall which would not break a normal, healthy bone.     Query created by: Denisa Arenas on 2021 3:25 PM      Electronically signed by:  Josefina Toussaint PA-C 2021 9:17 AM

## 2021-04-15 ENCOUNTER — HOME CARE VISIT (OUTPATIENT)
Dept: SCHEDULING | Facility: HOME HEALTH | Age: 61
End: 2021-04-15

## 2021-04-15 VITALS
SYSTOLIC BLOOD PRESSURE: 102 MMHG | HEART RATE: 70 BPM | OXYGEN SATURATION: 93 % | RESPIRATION RATE: 18 BRPM | TEMPERATURE: 97.2 F | DIASTOLIC BLOOD PRESSURE: 70 MMHG

## 2021-04-15 PROCEDURE — 400013 HH SOC

## 2021-04-15 PROCEDURE — G0299 HHS/HOSPICE OF RN EA 15 MIN: HCPCS

## 2021-04-19 ENCOUNTER — HOME CARE VISIT (OUTPATIENT)
Dept: SCHEDULING | Facility: HOME HEALTH | Age: 61
End: 2021-04-19

## 2021-04-19 VITALS
HEART RATE: 81 BPM | OXYGEN SATURATION: 96 % | DIASTOLIC BLOOD PRESSURE: 74 MMHG | TEMPERATURE: 97.5 F | RESPIRATION RATE: 18 BRPM | SYSTOLIC BLOOD PRESSURE: 112 MMHG

## 2021-04-19 VITALS
TEMPERATURE: 98.3 F | SYSTOLIC BLOOD PRESSURE: 116 MMHG | DIASTOLIC BLOOD PRESSURE: 64 MMHG | RESPIRATION RATE: 18 BRPM | HEART RATE: 64 BPM

## 2021-04-19 VITALS
RESPIRATION RATE: 16 BRPM | DIASTOLIC BLOOD PRESSURE: 88 MMHG | HEART RATE: 64 BPM | OXYGEN SATURATION: 94 % | SYSTOLIC BLOOD PRESSURE: 138 MMHG | TEMPERATURE: 97.7 F

## 2021-04-19 PROCEDURE — G0151 HHCP-SERV OF PT,EA 15 MIN: HCPCS

## 2021-04-19 PROCEDURE — G0152 HHCP-SERV OF OT,EA 15 MIN: HCPCS

## 2021-04-19 PROCEDURE — G0299 HHS/HOSPICE OF RN EA 15 MIN: HCPCS

## 2021-04-20 ENCOUNTER — HOME CARE VISIT (OUTPATIENT)
Dept: SCHEDULING | Facility: HOME HEALTH | Age: 61
End: 2021-04-20

## 2021-04-20 VITALS
DIASTOLIC BLOOD PRESSURE: 70 MMHG | TEMPERATURE: 97.9 F | HEART RATE: 86 BPM | RESPIRATION RATE: 18 BRPM | SYSTOLIC BLOOD PRESSURE: 122 MMHG

## 2021-04-20 PROCEDURE — G0156 HHCP-SVS OF AIDE,EA 15 MIN: HCPCS

## 2021-04-21 ENCOUNTER — HOME CARE VISIT (OUTPATIENT)
Dept: SCHEDULING | Facility: HOME HEALTH | Age: 61
End: 2021-04-21

## 2021-04-21 VITALS
SYSTOLIC BLOOD PRESSURE: 120 MMHG | HEART RATE: 86 BPM | DIASTOLIC BLOOD PRESSURE: 80 MMHG | OXYGEN SATURATION: 94 % | RESPIRATION RATE: 17 BRPM | TEMPERATURE: 98.3 F

## 2021-04-21 VITALS
OXYGEN SATURATION: 94 % | SYSTOLIC BLOOD PRESSURE: 120 MMHG | HEART RATE: 73 BPM | TEMPERATURE: 97.3 F | RESPIRATION RATE: 18 BRPM | DIASTOLIC BLOOD PRESSURE: 80 MMHG

## 2021-04-21 PROCEDURE — G0155 HHCP-SVS OF CSW,EA 15 MIN: HCPCS

## 2021-04-21 PROCEDURE — G0299 HHS/HOSPICE OF RN EA 15 MIN: HCPCS

## 2021-04-21 PROCEDURE — G0157 HHC PT ASSISTANT EA 15: HCPCS

## 2021-04-26 ENCOUNTER — HOME CARE VISIT (OUTPATIENT)
Dept: SCHEDULING | Facility: HOME HEALTH | Age: 61
End: 2021-04-26

## 2021-04-26 VITALS
RESPIRATION RATE: 17 BRPM | HEART RATE: 81 BPM | SYSTOLIC BLOOD PRESSURE: 140 MMHG | DIASTOLIC BLOOD PRESSURE: 80 MMHG | TEMPERATURE: 98.4 F | OXYGEN SATURATION: 96 %

## 2021-04-26 PROCEDURE — G0157 HHC PT ASSISTANT EA 15: HCPCS

## 2021-04-27 ENCOUNTER — HOME CARE VISIT (OUTPATIENT)
Dept: SCHEDULING | Facility: HOME HEALTH | Age: 61
End: 2021-04-27

## 2021-04-27 VITALS
TEMPERATURE: 98.1 F | HEART RATE: 65 BPM | RESPIRATION RATE: 18 BRPM | SYSTOLIC BLOOD PRESSURE: 130 MMHG | OXYGEN SATURATION: 95 % | DIASTOLIC BLOOD PRESSURE: 80 MMHG

## 2021-04-27 PROCEDURE — G0299 HHS/HOSPICE OF RN EA 15 MIN: HCPCS

## 2021-04-28 ENCOUNTER — HOME CARE VISIT (OUTPATIENT)
Dept: SCHEDULING | Facility: HOME HEALTH | Age: 61
End: 2021-04-28

## 2021-04-28 VITALS
SYSTOLIC BLOOD PRESSURE: 132 MMHG | DIASTOLIC BLOOD PRESSURE: 80 MMHG | HEART RATE: 74 BPM | OXYGEN SATURATION: 98 % | RESPIRATION RATE: 17 BRPM | TEMPERATURE: 98 F

## 2021-04-28 PROCEDURE — G0157 HHC PT ASSISTANT EA 15: HCPCS

## 2021-04-29 ENCOUNTER — HOME CARE VISIT (OUTPATIENT)
Dept: SCHEDULING | Facility: HOME HEALTH | Age: 61
End: 2021-04-29

## 2021-04-29 VITALS
TEMPERATURE: 98.4 F | DIASTOLIC BLOOD PRESSURE: 80 MMHG | OXYGEN SATURATION: 94 % | RESPIRATION RATE: 18 BRPM | SYSTOLIC BLOOD PRESSURE: 140 MMHG | HEART RATE: 81 BPM

## 2021-04-29 PROCEDURE — G0299 HHS/HOSPICE OF RN EA 15 MIN: HCPCS

## 2021-05-05 ENCOUNTER — HOME CARE VISIT (OUTPATIENT)
Dept: SCHEDULING | Facility: HOME HEALTH | Age: 61
End: 2021-05-05

## 2021-05-05 VITALS
DIASTOLIC BLOOD PRESSURE: 80 MMHG | HEART RATE: 92 BPM | SYSTOLIC BLOOD PRESSURE: 140 MMHG | OXYGEN SATURATION: 96 % | TEMPERATURE: 97.9 F | RESPIRATION RATE: 17 BRPM

## 2021-05-05 PROCEDURE — G0157 HHC PT ASSISTANT EA 15: HCPCS

## 2021-05-07 ENCOUNTER — HOME CARE VISIT (OUTPATIENT)
Dept: SCHEDULING | Facility: HOME HEALTH | Age: 61
End: 2021-05-07

## 2021-05-07 VITALS
SYSTOLIC BLOOD PRESSURE: 124 MMHG | HEART RATE: 80 BPM | TEMPERATURE: 97.8 F | DIASTOLIC BLOOD PRESSURE: 80 MMHG | RESPIRATION RATE: 17 BRPM | OXYGEN SATURATION: 98 %

## 2021-05-07 PROCEDURE — G0157 HHC PT ASSISTANT EA 15: HCPCS

## 2021-05-11 ENCOUNTER — HOME CARE VISIT (OUTPATIENT)
Dept: SCHEDULING | Facility: HOME HEALTH | Age: 61
End: 2021-05-11

## 2021-05-11 VITALS
OXYGEN SATURATION: 98 % | HEART RATE: 78 BPM | RESPIRATION RATE: 18 BRPM | DIASTOLIC BLOOD PRESSURE: 86 MMHG | TEMPERATURE: 98.2 F | SYSTOLIC BLOOD PRESSURE: 144 MMHG

## 2021-05-11 PROCEDURE — G0157 HHC PT ASSISTANT EA 15: HCPCS

## 2021-05-13 ENCOUNTER — HOME CARE VISIT (OUTPATIENT)
Dept: SCHEDULING | Facility: HOME HEALTH | Age: 61
End: 2021-05-13

## 2021-05-13 VITALS
RESPIRATION RATE: 18 BRPM | HEART RATE: 74 BPM | DIASTOLIC BLOOD PRESSURE: 88 MMHG | TEMPERATURE: 97.2 F | SYSTOLIC BLOOD PRESSURE: 138 MMHG

## 2021-05-13 PROCEDURE — G0151 HHCP-SERV OF PT,EA 15 MIN: HCPCS

## 2021-05-19 ENCOUNTER — HOME CARE VISIT (OUTPATIENT)
Dept: SCHEDULING | Facility: HOME HEALTH | Age: 61
End: 2021-05-19

## 2021-05-19 VITALS
HEART RATE: 78 BPM | SYSTOLIC BLOOD PRESSURE: 138 MMHG | RESPIRATION RATE: 18 BRPM | DIASTOLIC BLOOD PRESSURE: 80 MMHG | TEMPERATURE: 98.2 F

## 2021-05-19 PROCEDURE — G0151 HHCP-SERV OF PT,EA 15 MIN: HCPCS

## 2021-05-25 ENCOUNTER — APPOINTMENT (OUTPATIENT)
Dept: PHYSICAL THERAPY | Age: 61
End: 2021-05-25
Attending: ORTHOPAEDIC SURGERY

## 2021-09-10 NOTE — PROGRESS NOTES
Infectious Disease Progress Note    Today's Date: 2020   Admit Date: 2020    Impression:   · MSSA bacteremia (), TTE/DREA negative, source not clear, but does have a habit of picking her skin   · Upper back pain, CT c/a/p negative-intermittent  · Nausea/vomiting, persists-follow  · Leukocytosis, improving  · HTN    Plan:   · Discontinue PICC order, repeat BC will need to be negative for 48hrs before we place this  · Follow BC collected today  · Anticipate 2 weeks of therapy, unless above are positive  · Dispo: will need CM assistance, pt is unfunded and will need IV antbx; Dalbavancin is NOT an option with bacteremia     Anti-infectives:   · Cefazolin -  · vanc -  · CTX -    Subjective:   Nausea/emesis better off opioids, upper back pain improve since admission. ID treatment plan reviewed with pt. Allergies   Allergen Reactions    Lortab [Hydrocodone-Acetaminophen] Nausea and Vomiting        Review of Systems:  A comprehensive review of systems was negative except for that written in the History of Present Illness.     Objective:     Visit Vitals  BP (!) 154/99   Pulse 83   Temp 98.8 °F (37.1 °C)   Resp 21   Ht 5' 4\" (1.626 m)   Wt 117.2 kg (258 lb 6.4 oz)   SpO2 94%   BMI 44.35 kg/m²     Temp (24hrs), Av.2 °F (36.8 °C), Min:97.9 °F (36.6 °C), Max:98.8 °F (37.1 °C)     General:  Alert, cooperative, no acute distress, appears stated age, morbidly obese   Head:  Normocephalic, atraumatic    Eyes:  Anicteric, no drainage, not injected, EOMI   Throat: Mucus membranes moist OP clear   Neck: Supple, symmetrical, trachea midline, no JVD   Lungs:   Clear throughout lung fields without increased work of breathing or audible wheezes   Heart:  Regular rate and rhythm, without audible murmur, rub, or gallop   Abdomen:   Soft, non-tender, no guarding, no distention, bowel sounds active   Extremities: Extremities normal, atraumatic, no cyanosis or edema   Pulses: 2+ and symmetric   Skin: Patient is due for Prolia on Oct 8th. Please place therapy order to start the process. Thank you!    Front, please call daughter when this is scheduled.   Skin color, texture, turgor normal, no rashes or lesions. Lines/Devices: PIV R arm         Data Review:     CBC:  Recent Labs     07/23/20 0353 07/22/20 0409 07/21/20 0453   WBC 15.6* 14.6* 16.2*   GRANS 76 79* 86*   MONOS 13* 11 8   EOS 0* 0* 0*   ANEU 11.7* 11.6* 14.0*   ABL 1.5 1.2 0.8   HGB 12.5 12.5 11.6*   HCT 39.4 37.9 36.3    171 182       BMP:  Recent Labs     07/23/20  0353 07/22/20  0409 07/21/20  0453   CREA 0.98 1.19* 0.80   BUN 24* 27* 19    136 138   K 3.5 3.4* 3.3*   CL 98 100 101   CO2 32 28 28   AGAP 6* 8 9   * 118* 128*       LFTS:  No results for input(s): TBILI, ALT, AP, TP, ALB in the last 72 hours.     No lab exists for component: SGOT    Microbiology:     All Micro Results     Procedure Component Value Units Date/Time    CULTURE, BLOOD [[id number]]  (Abnormal) Collected:  07/20/20 1828    Order Status:  Completed Specimen:  Blood Updated:  07/23/20 0737     Special Requests: --        RIGHT  HAND       GRAM STAIN       GRAM POS COCCI IN CLUSTERS                  AEROBIC AND ANAEROBIC BOTTLES                  RESULTS VERIFIED, PHONED TO AND READ BACK BY NADIA Her RN ON 7.21.2020 AT 09,             Culture result: STAPHYLOCOCCUS AUREUS               For Susceptibility Refer to Culture  [id number]      CULTURE, BLOOD [[id number]]  (Abnormal)  (Susceptibility) Collected:  07/20/20 1740    Order Status:  Completed Specimen:  Blood Updated:  07/23/20 0736     Special Requests: --        LEFT  HAND       GRAM STAIN       GRAM POS COCCI IN CLUSTERS                  AEROBIC AND ANAEROBIC BOTTLES                  RESULTS VERIFIED, PHONED TO AND READ BACK BY NADIA Her RN ON 7.21.2020 AT 0957,             Culture result: STAPHYLOCOCCUS AUREUS         REFER TO Katharine Tucker [id number]     CULTURE, BLOOD [[id number]] Collected:  07/23/20 0353    Order Status:  Completed Specimen:  Blood Updated:  07/23/20 0442    CULTURE, BLOOD [[id number]] Collected: 07/23/20 0359    Order Status:  Completed Specimen:  Blood Updated:  07/23/20 0442    BLOOD CULTURE ID PANEL [[id number]]  (Abnormal) Collected:  07/20/20 1740    Order Status:  Completed Specimen:  Blood Updated:  07/21/20 1121     Acc. no. from Micro Order [id number]     Staphylococcus Detected        Staphylococcus aureus Detected        Comment: RESULTS VERIFIED, PHONED TO AND READ BACK BY  NADIA Her RN ON 7/21/20 @0957,           mecA (Methicillin-Resistance Genes) NOT DETECTED        INTERPRETATION       Gram positive cocci in clusters, identified in realtime PCR as probable MSSA. Comment: Recommend discontinuing IV vancomycin starting cefazolin or nafcillin if patient not on beta-lactam therapy. Infectious Diseases Consult recommended in adult patients. THIS TEST DOES NOT REPLACE SENSITIVITY TESTING.              Imaging:   Reviewed    Signed By: Nacho Prado NP     July 23, 2020

## 2022-03-18 PROBLEM — R65.10 SIRS (SYSTEMIC INFLAMMATORY RESPONSE SYNDROME) (HCC): Status: ACTIVE | Noted: 2020-07-20

## 2022-03-19 PROBLEM — I10 HTN (HYPERTENSION), MALIGNANT: Status: ACTIVE | Noted: 2020-07-20

## 2022-03-19 PROBLEM — S72.92XA FEMUR FRACTURE, LEFT (HCC): Status: ACTIVE | Noted: 2021-04-06

## 2022-03-19 PROBLEM — M19.90 ARTHRITIS: Status: ACTIVE | Noted: 2020-07-20

## 2022-03-19 PROBLEM — R11.2 NAUSEA AND VOMITING: Status: ACTIVE | Noted: 2020-07-22

## 2022-03-19 PROBLEM — E66.01 MORBIDLY OBESE (HCC): Status: ACTIVE | Noted: 2020-07-20

## 2022-03-19 PROBLEM — D35.01 ADRENAL ADENOMA, RIGHT: Status: ACTIVE | Noted: 2020-07-20

## 2022-03-19 PROBLEM — M46.20 OSTEOMYELITIS OF SPINE (HCC): Status: ACTIVE | Noted: 2020-07-25

## 2022-03-19 PROBLEM — S72.402A CLOSED FRACTURE OF LEFT DISTAL FEMUR (HCC): Status: ACTIVE | Noted: 2021-04-06

## 2022-03-19 PROBLEM — K59.00 CONSTIPATION: Status: ACTIVE | Noted: 2020-07-22

## 2022-03-20 PROBLEM — E87.6 HYPOKALEMIA: Status: ACTIVE | Noted: 2020-07-20

## 2023-05-10 RX ORDER — IBUPROFEN 200 MG
200 TABLET ORAL EVERY 6 HOURS PRN
COMMUNITY

## 2023-05-10 RX ORDER — MELOXICAM 15 MG/1
15 TABLET ORAL DAILY
COMMUNITY

## 2023-05-10 RX ORDER — POLYETHYLENE GLYCOL 3350 17 G/17G
17 POWDER, FOR SOLUTION ORAL DAILY
COMMUNITY

## 2023-05-10 RX ORDER — LISINOPRIL AND HYDROCHLOROTHIAZIDE 25; 20 MG/1; MG/1
1 TABLET ORAL DAILY
COMMUNITY
Start: 2019-02-10

## 2023-12-02 ENCOUNTER — HOSPITAL ENCOUNTER (INPATIENT)
Age: 63
LOS: 6 days | Discharge: HOME | End: 2023-12-08
Attending: EMERGENCY MEDICINE | Admitting: STUDENT IN AN ORGANIZED HEALTH CARE EDUCATION/TRAINING PROGRAM
Payer: COMMERCIAL

## 2023-12-02 ENCOUNTER — APPOINTMENT (OUTPATIENT)
Dept: GENERAL RADIOLOGY | Age: 63
End: 2023-12-02
Payer: COMMERCIAL

## 2023-12-02 DIAGNOSIS — R09.02 HYPOXIA: ICD-10-CM

## 2023-12-02 DIAGNOSIS — J81.0 ACUTE PULMONARY EDEMA (HCC): ICD-10-CM

## 2023-12-02 DIAGNOSIS — J96.01 ACUTE RESPIRATORY FAILURE WITH HYPOXIA AND HYPERCAPNIA (HCC): ICD-10-CM

## 2023-12-02 DIAGNOSIS — J96.02 ACUTE RESPIRATORY FAILURE WITH HYPOXIA AND HYPERCAPNIA (HCC): ICD-10-CM

## 2023-12-02 DIAGNOSIS — I50.9 ACUTE CONGESTIVE HEART FAILURE, UNSPECIFIED HEART FAILURE TYPE (HCC): Primary | ICD-10-CM

## 2023-12-02 PROBLEM — D72.829 LEUKOCYTOSIS: Status: ACTIVE | Noted: 2023-12-02

## 2023-12-02 PROBLEM — E66.01 MORBIDLY OBESE (HCC): Status: ACTIVE | Noted: 2020-07-20

## 2023-12-02 PROBLEM — I16.0 HYPERTENSIVE URGENCY: Status: ACTIVE | Noted: 2023-12-02

## 2023-12-02 PROBLEM — R65.10 SIRS (SYSTEMIC INFLAMMATORY RESPONSE SYNDROME) (HCC): Status: RESOLVED | Noted: 2020-07-20 | Resolved: 2023-12-02

## 2023-12-02 LAB
ALBUMIN SERPL-MCNC: 3 G/DL (ref 3.2–4.6)
ALBUMIN/GLOB SERPL: 0.6 (ref 0.4–1.6)
ALP SERPL-CCNC: 148 U/L (ref 50–136)
ALT SERPL-CCNC: 17 U/L (ref 12–65)
AMPHET UR QL SCN: NEGATIVE
ANION GAP BLD CALC-SCNC: ABNORMAL MMOL/L
ANION GAP SERPL CALC-SCNC: ABNORMAL MMOL/L (ref 2–11)
ARTERIAL PATENCY WRIST A: POSITIVE
AST SERPL-CCNC: 9 U/L (ref 15–37)
B PERT DNA SPEC QL NAA+PROBE: NOT DETECTED
BARBITURATES UR QL SCN: NEGATIVE
BASE EXCESS BLD CALC-SCNC: 5.6 MMOL/L
BASE EXCESS BLD CALC-SCNC: 6.9 MMOL/L
BASE EXCESS BLD CALC-SCNC: 8.2 MMOL/L
BASE EXCESS BLD CALC-SCNC: 9.3 MMOL/L
BASOPHILS # BLD: 0.1 K/UL (ref 0–0.2)
BASOPHILS NFR BLD: 0 % (ref 0–2)
BDY SITE: ABNORMAL
BENZODIAZ UR QL: NEGATIVE
BILIRUB SERPL-MCNC: 0.5 MG/DL (ref 0.2–1.1)
BORDETELLA PARAPERTUSSIS BY PCR: NOT DETECTED
BUN SERPL-MCNC: 13 MG/DL (ref 8–23)
C PNEUM DNA SPEC QL NAA+PROBE: NOT DETECTED
CA-I BLD-MCNC: 1.2 MMOL/L (ref 1.12–1.32)
CA-I BLD-MCNC: 1.21 MMOL/L (ref 1.12–1.32)
CA-I BLD-MCNC: 1.22 MMOL/L (ref 1.12–1.32)
CALCIUM SERPL-MCNC: 9.3 MG/DL (ref 8.3–10.4)
CANNABINOIDS UR QL SCN: POSITIVE
CHLORIDE SERPL-SCNC: 108 MMOL/L (ref 101–110)
CO2 BLD-SCNC: 35 MMOL/L (ref 13–23)
CO2 BLD-SCNC: 37 MMOL/L (ref 13–23)
CO2 BLD-SCNC: 39 MMOL/L (ref 13–23)
CO2 SERPL-SCNC: 33 MMOL/L (ref 21–32)
COCAINE UR QL SCN: NEGATIVE
CREAT SERPL-MCNC: 0.7 MG/DL (ref 0.6–1)
DIFFERENTIAL METHOD BLD: ABNORMAL
EKG ATRIAL RATE: 101 BPM
EKG DIAGNOSIS: NORMAL
EKG P AXIS: 34 DEGREES
EKG P-R INTERVAL: 163 MS
EKG Q-T INTERVAL: 378 MS
EKG QRS DURATION: 100 MS
EKG QTC CALCULATION (BAZETT): 490 MS
EKG R AXIS: 5 DEGREES
EKG T AXIS: 31 DEGREES
EKG VENTRICULAR RATE: 101 BPM
EOSINOPHIL # BLD: 0 K/UL (ref 0–0.8)
EOSINOPHIL NFR BLD: 0 % (ref 0.5–7.8)
ERYTHROCYTE [DISTWIDTH] IN BLOOD BY AUTOMATED COUNT: 16.5 % (ref 11.9–14.6)
FIO2 ON VENT: 95 %
FLUAV RNA SPEC QL NAA+PROBE: NOT DETECTED
FLUAV SUBTYP SPEC NAA+PROBE: NOT DETECTED
FLUBV RNA SPEC QL NAA+PROBE: NOT DETECTED
FLUBV RNA SPEC QL NAA+PROBE: NOT DETECTED
GAS FLOW.O2 O2 DELIVERY SYS: ABNORMAL
GLOBULIN SER CALC-MCNC: 5.1 G/DL (ref 2.8–4.5)
GLUCOSE BLD STRIP.AUTO-MCNC: 112 MG/DL (ref 65–100)
GLUCOSE BLD STRIP.AUTO-MCNC: 114 MG/DL (ref 65–100)
GLUCOSE BLD STRIP.AUTO-MCNC: 130 MG/DL (ref 65–100)
GLUCOSE SERPL-MCNC: 123 MG/DL (ref 65–100)
HADV DNA SPEC QL NAA+PROBE: NOT DETECTED
HCO3 BLD-SCNC: 34.8 MMOL/L (ref 22–26)
HCO3 BLD-SCNC: 36.3 MMOL/L (ref 22–26)
HCO3 BLD-SCNC: 37.5 MMOL/L (ref 22–26)
HCO3 BLD-SCNC: 38.8 MMOL/L (ref 22–26)
HCOV 229E RNA SPEC QL NAA+PROBE: NOT DETECTED
HCOV HKU1 RNA SPEC QL NAA+PROBE: NOT DETECTED
HCOV NL63 RNA SPEC QL NAA+PROBE: NOT DETECTED
HCOV OC43 RNA SPEC QL NAA+PROBE: NOT DETECTED
HCT VFR BLD AUTO: 45.9 % (ref 35.8–46.3)
HGB BLD-MCNC: 13.8 G/DL (ref 11.7–15.4)
HMPV RNA SPEC QL NAA+PROBE: NOT DETECTED
HPIV1 RNA SPEC QL NAA+PROBE: NOT DETECTED
HPIV2 RNA SPEC QL NAA+PROBE: NOT DETECTED
HPIV3 RNA SPEC QL NAA+PROBE: NOT DETECTED
HPIV4 RNA SPEC QL NAA+PROBE: NOT DETECTED
IMM GRANULOCYTES # BLD AUTO: 0.1 K/UL (ref 0–0.5)
IMM GRANULOCYTES NFR BLD AUTO: 1 % (ref 0–5)
INSPIRATION.DURATION SETTING TIME VENT: 0.9 SEC
IPAP/PIP/HIGH PEEP: 22
LACTATE SERPL-SCNC: 1.3 MMOL/L (ref 0.4–2)
LYMPHOCYTES # BLD: 0.9 K/UL (ref 0.5–4.6)
LYMPHOCYTES NFR BLD: 6 % (ref 13–44)
M PNEUMO DNA SPEC QL NAA+PROBE: NOT DETECTED
MAGNESIUM SERPL-MCNC: 2.2 MG/DL (ref 1.8–2.4)
MCH RBC QN AUTO: 27.9 PG (ref 26.1–32.9)
MCHC RBC AUTO-ENTMCNC: 30.1 G/DL (ref 31.4–35)
MCV RBC AUTO: 92.7 FL (ref 82–102)
METHADONE UR QL: NEGATIVE
MONOCYTES # BLD: 0.5 K/UL (ref 0.1–1.3)
MONOCYTES NFR BLD: 3 % (ref 4–12)
NEUTS SEG # BLD: 14.6 K/UL (ref 1.7–8.2)
NEUTS SEG NFR BLD: 90 % (ref 43–78)
NRBC # BLD: 0 K/UL (ref 0–0.2)
NT PRO BNP: 434 PG/ML (ref 5–125)
O2/TOTAL GAS SETTING VFR VENT: 60 %
OPIATES UR QL: NEGATIVE
PCO2 BLD: 68.9 MMHG (ref 35–45)
PCO2 BLD: 70.2 MMHG (ref 35–45)
PCO2 BLD: 70.5 MMHG (ref 35–45)
PCO2 BLD: 71.3 MMHG (ref 35–45)
PCP UR QL: NEGATIVE
PEEP RESPIRATORY: 10 CMH2O
PEEP RESPIRATORY: 8
PH BLD: 7.31 (ref 7.35–7.45)
PH BLD: 7.32 (ref 7.35–7.45)
PH BLD: 7.33 (ref 7.35–7.45)
PH BLD: 7.34 (ref 7.35–7.45)
PLATELET # BLD AUTO: 254 K/UL (ref 150–450)
PMV BLD AUTO: 11 FL (ref 9.4–12.3)
PO2 BLD: 109 MMHG (ref 75–100)
PO2 BLD: 115 MMHG (ref 75–100)
PO2 BLD: 66 MMHG (ref 75–100)
PO2 BLD: 86 MMHG (ref 75–100)
POC FIO2: 9
POTASSIUM BLD-SCNC: 3.6 MMOL/L (ref 3.5–5.1)
POTASSIUM BLD-SCNC: 3.7 MMOL/L (ref 3.5–5.1)
POTASSIUM BLD-SCNC: 3.7 MMOL/L (ref 3.5–5.1)
POTASSIUM SERPL-SCNC: 3.6 MMOL/L (ref 3.5–5.1)
PRESSURE SUPPORT SETTING VENT: 10 CMH2O
PRESSURE SUPPORT SETTING VENT: 16
PROCALCITONIN SERPL-MCNC: <0.05 NG/ML (ref 0–0.49)
PROT SERPL-MCNC: 8.1 G/DL (ref 6.3–8.2)
RBC # BLD AUTO: 4.95 M/UL (ref 4.05–5.2)
RESPIRATORY RATE, POC: 30 (ref 5–40)
RESPIRATORY RATE, POC: 32 (ref 5–40)
RSV RNA SPEC QL NAA+PROBE: NOT DETECTED
RV+EV RNA SPEC QL NAA+PROBE: NOT DETECTED
SAO2 % BLD: 90 %
SAO2 % BLD: 95.2 % (ref 95–98)
SAO2 % BLD: 98 %
SAO2 % BLD: 98 %
SARS-COV-2 RDRP RESP QL NAA+PROBE: NOT DETECTED
SARS-COV-2 RNA RESP QL NAA+PROBE: NOT DETECTED
SERVICE CMNT-IMP: ABNORMAL
SODIUM BLD-SCNC: 146 MMOL/L (ref 136–145)
SODIUM BLD-SCNC: 147 MMOL/L (ref 136–145)
SODIUM BLD-SCNC: 147 MMOL/L (ref 136–145)
SODIUM SERPL-SCNC: 138 MMOL/L (ref 133–143)
SOURCE: NORMAL
SPECIMEN SITE: ABNORMAL
SPECIMEN TYPE: ABNORMAL
TROPONIN I SERPL HS-MCNC: 32.6 PG/ML (ref 0–14)
VENTILATION MODE VENT: ABNORMAL
VENTILATION MODE VENT: ABNORMAL
WBC # BLD AUTO: 16.2 K/UL (ref 4.3–11.1)

## 2023-12-02 PROCEDURE — 82330 ASSAY OF CALCIUM: CPT

## 2023-12-02 PROCEDURE — 93010 ELECTROCARDIOGRAM REPORT: CPT | Performed by: INTERNAL MEDICINE

## 2023-12-02 PROCEDURE — 36415 COLL VENOUS BLD VENIPUNCTURE: CPT

## 2023-12-02 PROCEDURE — 2580000003 HC RX 258: Performed by: INTERNAL MEDICINE

## 2023-12-02 PROCEDURE — 94660 CPAP INITIATION&MGMT: CPT

## 2023-12-02 PROCEDURE — 2100000000 HC CCU R&B

## 2023-12-02 PROCEDURE — 5A09357 ASSISTANCE WITH RESPIRATORY VENTILATION, LESS THAN 24 CONSECUTIVE HOURS, CONTINUOUS POSITIVE AIRWAY PRESSURE: ICD-10-PCS | Performed by: STUDENT IN AN ORGANIZED HEALTH CARE EDUCATION/TRAINING PROGRAM

## 2023-12-02 PROCEDURE — 84295 ASSAY OF SERUM SODIUM: CPT

## 2023-12-02 PROCEDURE — 83735 ASSAY OF MAGNESIUM: CPT

## 2023-12-02 PROCEDURE — 6360000002 HC RX W HCPCS: Performed by: INTERNAL MEDICINE

## 2023-12-02 PROCEDURE — 71045 X-RAY EXAM CHEST 1 VIEW: CPT

## 2023-12-02 PROCEDURE — 0202U NFCT DS 22 TRGT SARS-COV-2: CPT

## 2023-12-02 PROCEDURE — 83880 ASSAY OF NATRIURETIC PEPTIDE: CPT

## 2023-12-02 PROCEDURE — 87635 SARS-COV-2 COVID-19 AMP PRB: CPT

## 2023-12-02 PROCEDURE — 84132 ASSAY OF SERUM POTASSIUM: CPT

## 2023-12-02 PROCEDURE — 83605 ASSAY OF LACTIC ACID: CPT

## 2023-12-02 PROCEDURE — 84484 ASSAY OF TROPONIN QUANT: CPT

## 2023-12-02 PROCEDURE — 6360000002 HC RX W HCPCS: Performed by: EMERGENCY MEDICINE

## 2023-12-02 PROCEDURE — 36600 WITHDRAWAL OF ARTERIAL BLOOD: CPT

## 2023-12-02 PROCEDURE — 99223 1ST HOSP IP/OBS HIGH 75: CPT | Performed by: INTERNAL MEDICINE

## 2023-12-02 PROCEDURE — 80307 DRUG TEST PRSMV CHEM ANLYZR: CPT

## 2023-12-02 PROCEDURE — 96374 THER/PROPH/DIAG INJ IV PUSH: CPT

## 2023-12-02 PROCEDURE — 85025 COMPLETE CBC W/AUTO DIFF WBC: CPT

## 2023-12-02 PROCEDURE — 93005 ELECTROCARDIOGRAM TRACING: CPT | Performed by: EMERGENCY MEDICINE

## 2023-12-02 PROCEDURE — 99285 EMERGENCY DEPT VISIT HI MDM: CPT

## 2023-12-02 PROCEDURE — 84145 PROCALCITONIN (PCT): CPT

## 2023-12-02 PROCEDURE — 82947 ASSAY GLUCOSE BLOOD QUANT: CPT

## 2023-12-02 PROCEDURE — 2580000003 HC RX 258: Performed by: EMERGENCY MEDICINE

## 2023-12-02 PROCEDURE — 2700000000 HC OXYGEN THERAPY PER DAY

## 2023-12-02 PROCEDURE — 6360000002 HC RX W HCPCS

## 2023-12-02 PROCEDURE — 87040 BLOOD CULTURE FOR BACTERIA: CPT

## 2023-12-02 PROCEDURE — 87502 INFLUENZA DNA AMP PROBE: CPT

## 2023-12-02 PROCEDURE — 80053 COMPREHEN METABOLIC PANEL: CPT

## 2023-12-02 PROCEDURE — 82803 BLOOD GASES ANY COMBINATION: CPT

## 2023-12-02 RX ORDER — NITROGLYCERIN 20 MG/100ML
5-200 INJECTION INTRAVENOUS CONTINUOUS
Status: DISCONTINUED | OUTPATIENT
Start: 2023-12-02 | End: 2023-12-05

## 2023-12-02 RX ORDER — ONDANSETRON 4 MG/1
4 TABLET, ORALLY DISINTEGRATING ORAL EVERY 8 HOURS PRN
Status: DISCONTINUED | OUTPATIENT
Start: 2023-12-02 | End: 2023-12-08 | Stop reason: HOSPADM

## 2023-12-02 RX ORDER — HYDROCHLOROTHIAZIDE 25 MG/1
25 TABLET ORAL DAILY
Status: DISCONTINUED | OUTPATIENT
Start: 2023-12-03 | End: 2023-12-08 | Stop reason: HOSPADM

## 2023-12-02 RX ORDER — SODIUM CHLORIDE 0.9 % (FLUSH) 0.9 %
5-40 SYRINGE (ML) INJECTION EVERY 12 HOURS SCHEDULED
Status: DISCONTINUED | OUTPATIENT
Start: 2023-12-02 | End: 2023-12-08 | Stop reason: HOSPADM

## 2023-12-02 RX ORDER — MAGNESIUM SULFATE IN WATER 40 MG/ML
2000 INJECTION, SOLUTION INTRAVENOUS PRN
Status: DISCONTINUED | OUTPATIENT
Start: 2023-12-02 | End: 2023-12-08 | Stop reason: HOSPADM

## 2023-12-02 RX ORDER — ONDANSETRON 2 MG/ML
4 INJECTION INTRAMUSCULAR; INTRAVENOUS EVERY 6 HOURS PRN
Status: DISCONTINUED | OUTPATIENT
Start: 2023-12-02 | End: 2023-12-08 | Stop reason: HOSPADM

## 2023-12-02 RX ORDER — HYDRALAZINE HYDROCHLORIDE 20 MG/ML
10 INJECTION INTRAMUSCULAR; INTRAVENOUS ONCE
Status: COMPLETED | OUTPATIENT
Start: 2023-12-02 | End: 2023-12-02

## 2023-12-02 RX ORDER — POTASSIUM CHLORIDE 7.45 MG/ML
10 INJECTION INTRAVENOUS PRN
Status: DISCONTINUED | OUTPATIENT
Start: 2023-12-02 | End: 2023-12-08 | Stop reason: HOSPADM

## 2023-12-02 RX ORDER — SODIUM CHLORIDE 0.9 % (FLUSH) 0.9 %
5-40 SYRINGE (ML) INJECTION PRN
Status: DISCONTINUED | OUTPATIENT
Start: 2023-12-02 | End: 2023-12-08 | Stop reason: HOSPADM

## 2023-12-02 RX ORDER — LISINOPRIL 20 MG/1
20 TABLET ORAL DAILY
Status: DISCONTINUED | OUTPATIENT
Start: 2023-12-03 | End: 2023-12-03

## 2023-12-02 RX ORDER — ACETAMINOPHEN 650 MG/1
650 SUPPOSITORY RECTAL EVERY 6 HOURS PRN
Status: DISCONTINUED | OUTPATIENT
Start: 2023-12-02 | End: 2023-12-08 | Stop reason: HOSPADM

## 2023-12-02 RX ORDER — ENOXAPARIN SODIUM 100 MG/ML
30 INJECTION SUBCUTANEOUS 2 TIMES DAILY
Status: DISCONTINUED | OUTPATIENT
Start: 2023-12-02 | End: 2023-12-08 | Stop reason: HOSPADM

## 2023-12-02 RX ORDER — 0.9 % SODIUM CHLORIDE 0.9 %
500 INTRAVENOUS SOLUTION INTRAVENOUS
Status: COMPLETED | OUTPATIENT
Start: 2023-12-02 | End: 2023-12-02

## 2023-12-02 RX ORDER — FUROSEMIDE 10 MG/ML
40 INJECTION INTRAMUSCULAR; INTRAVENOUS ONCE
Status: COMPLETED | OUTPATIENT
Start: 2023-12-02 | End: 2023-12-02

## 2023-12-02 RX ORDER — NITROGLYCERIN 0.4 MG/1
0.4 TABLET SUBLINGUAL
Status: ACTIVE | OUTPATIENT
Start: 2023-12-02 | End: 2023-12-03

## 2023-12-02 RX ORDER — ACETAMINOPHEN 325 MG/1
650 TABLET ORAL EVERY 6 HOURS PRN
Status: DISCONTINUED | OUTPATIENT
Start: 2023-12-02 | End: 2023-12-08 | Stop reason: HOSPADM

## 2023-12-02 RX ORDER — POTASSIUM CHLORIDE 29.8 MG/ML
20 INJECTION INTRAVENOUS PRN
Status: DISCONTINUED | OUTPATIENT
Start: 2023-12-02 | End: 2023-12-08 | Stop reason: HOSPADM

## 2023-12-02 RX ORDER — FUROSEMIDE 10 MG/ML
40 INJECTION INTRAMUSCULAR; INTRAVENOUS DAILY
Status: DISCONTINUED | OUTPATIENT
Start: 2023-12-03 | End: 2023-12-07

## 2023-12-02 RX ORDER — POLYETHYLENE GLYCOL 3350 17 G/17G
17 POWDER, FOR SOLUTION ORAL DAILY PRN
Status: DISCONTINUED | OUTPATIENT
Start: 2023-12-02 | End: 2023-12-08 | Stop reason: HOSPADM

## 2023-12-02 RX ORDER — SODIUM CHLORIDE 9 MG/ML
INJECTION, SOLUTION INTRAVENOUS PRN
Status: DISCONTINUED | OUTPATIENT
Start: 2023-12-02 | End: 2023-12-08 | Stop reason: HOSPADM

## 2023-12-02 RX ADMIN — SODIUM CHLORIDE 500 ML: 9 INJECTION, SOLUTION INTRAVENOUS at 14:09

## 2023-12-02 RX ADMIN — ENOXAPARIN SODIUM 30 MG: 100 INJECTION SUBCUTANEOUS at 19:34

## 2023-12-02 RX ADMIN — NITROGLYCERIN 20 MCG/MIN: 20 INJECTION INTRAVENOUS at 15:40

## 2023-12-02 RX ADMIN — SODIUM CHLORIDE, PRESERVATIVE FREE 10 ML: 5 INJECTION INTRAVENOUS at 19:34

## 2023-12-02 RX ADMIN — FUROSEMIDE 40 MG: 10 INJECTION, SOLUTION INTRAMUSCULAR; INTRAVENOUS at 15:08

## 2023-12-02 RX ADMIN — HYDRALAZINE HYDROCHLORIDE 10 MG: 20 INJECTION, SOLUTION INTRAMUSCULAR; INTRAVENOUS at 19:33

## 2023-12-02 ASSESSMENT — PAIN SCALES - GENERAL
PAINLEVEL_OUTOF10: 0

## 2023-12-02 ASSESSMENT — PAIN - FUNCTIONAL ASSESSMENT: PAIN_FUNCTIONAL_ASSESSMENT: 0-10

## 2023-12-02 NOTE — H&P
HISTORY AND PHYSICAL  Hanna Rosales  12/2/2023   Date of Admission:  12/2/2023    The patient's chart is reviewed and the patient is discussed with the staff. Subjective:     Patient is a 66 y.o. female presents with a history of obesity, HTN, osteomyelitis of spine in 2020 with MSSA bacteremia. She presented to the ER 12/2 with c/o increased SOB. Per report she was found to have a rA sat of 56% by EMS. She was placed on NRB. She was in hypertensive urgency with highest  and 's. She reported dry cough with occasional mucus production. In the ER she was quite hypoxic and abg showed CO2 in the 70's. She has now been placed on bipap and pulmonary has been asked to admit. Initial w/u significant for WBC of 16, CXR with cardiomegaly, pulmonary vascular congestion, B infiltrates, small L effusion suggestive of pulmonary edema. Her BNP was only 434. Last TTE 2020 with normal EF. Rapid covid and flu were negative. She states she has been noticing increased LE edema recently above her baseline. No real change in her weight. No fever, chills, myalgias or other infectious symptoms. She states she is not on O2 at home, does not have a cpap. No diagnosed lung disease. She occasionally smokes marijuana but denies vaping.        Review of Systems:  Comprehensive ROS negative except in HPI    Current Outpatient Medications   Medication Instructions    ibuprofen (ADVIL;MOTRIN) 200 mg, Oral, EVERY 6 HOURS PRN    lisinopril-hydroCHLOROthiazide (PRINZIDE;ZESTORETIC) 20-25 MG per tablet 1 tablet, Oral, DAILY    meloxicam (MOBIC) 15 mg, Oral, DAILY    polyethylene glycol (GLYCOLAX) 17 g, Oral, DAILY    vitamin D (CHOLECALCIFEROL) 2,000 Units, Oral, DAILY      Past Medical History:   Diagnosis Date    Adrenal adenoma, right 7/20/2020    Arthritis 7/20/2020    Hypertension     Nausea and vomiting 7/22/2020    Sepsis due interpretation of the patient's images. CXR: 12/2/23    No results found for this or any previous visit. No results found for this or any previous visit. LAB:  Recent Labs     12/02/23  1312   WBC 16.2*   HGB 13.8   HCT 45.9        Recent Labs     12/02/23  1312      K 3.6      CO2 33*   BUN 13   CREATININE 0.70   MG 2.2   BILITOT 0.5   AST 9*   ALT 17   ALKPHOS 148*     Recent Labs     12/02/23  1312   TROPHS 32.6*   NTPROBNP 434*     Recent Labs     12/02/23  1308 12/02/23  1431   BE 6.9 5.6     Microbiology:   No results for input(s): \"CULTURE\" in the last 72 hours. Assessment and Plan:  (Medical Decision Making)   Impression: Pt with history of severe obesity, tobacco abuse, no known lung or cardiac disease. Presenting with a couple days of increased SOB and LE edema. WBC elevated and CXR with B infiltrates. Covid and flu negative. Somewhat unclear if this is pulmonary edema or infection. Active Problems: Morbidly obese ([address])  Plan: complicates her care. Acute respiratory failure with hypoxia and hypercapnia (HCC)  Plan: on bipap for CO2 of 70. Repeat abg with no improvement. Will increase IPAP and repeat abg in a few hours. Reviewed possible need for intubation with her and she is full code. No current respiratory distress, however she is high risk of further decompensation and need for intubation. Acute pulmonary edema (HCC)  Plan: presumed cause of B infiltrates. Will also check RVP and pct. Already got lasix 40mg iv once. Will continue daily. Daily bmp's. Will check TTE.   H/o marijuana use but no vaping to suggest vape related lung disease. Will check UDS to ensure no other substance use contributing to her current pulmonary issues. Leukocytosis  Plan: as above. Hold on abx without clear signs of infection. Hypertensive urgency  Plan: BP now better, 150's/100s. Takes [id number] at home. Continue here.       More than 50% of the time

## 2023-12-02 NOTE — PROGRESS NOTES
12/02/23 1551   NIV Type   $NIV $Daily Charge   NIV Started/Stopped On   Equipment Type v60   Mode Bilevel   Mask Type Under the nose   Mask Size Medium   Assessment   Level of Consciousness 0   Using Accessory Muscles No   Mask Compliance Good   Skin Assessment Clean, dry, & intact   Breath Sounds   Breath Sounds Bilateral Diminished   Settings/Measurements   PIP Observed 18 cm H20   IPAP 16 cmH20   CPAP/EPAP 8 cmH2O   Vt (Measured) 475 mL   Rate Ordered 16   Insp Rise Time (%) 3 %   FiO2  70 %   I Time/ I Time % 0.9 s   Minute Volume (L/min) 14.8 Liters   Mask Leak (lpm) 25 lpm   Patient's Home Machine No   Alarm Settings   Alarms On Y   Low Pressure (cmH2O) 5 cmH2O   High Pressure (cmH2O) 35 cmH2O   Apnea (secs) 20 secs   RR Low (bpm) 8   RR High (bpm) 50 br/min   Patient Observation   Observations total RR 32

## 2023-12-02 NOTE — ED TRIAGE NOTES
Pt to ED via [facility] on NRB 15 LPM for increased shortness of breath for the past couple of days. Pt states no hx of pulmonary disease. 56% on RA with EMS after 15 LPM on NRB pt was 95% or higher. Pt initial bp is 180/110 states this is her baseline. Pt states feeling as if she has been having to [place] for air. \" PT states dry cough with green/white mucus at times. Pt denies any sick contacts at this time.         HR low 100s sinus tach

## 2023-12-02 NOTE — PROGRESS NOTES
TRANSFER - IN REPORT:    Verbal report received from ER RN on Myriam Guillen  being received from ER for routine progression of patient care      Report consisted of patient's Situation, Background, Assessment and   Recommendations(SBAR). Information from the following report(s) Nurse Handoff Report, ED Encounter Summary, ED SBAR, Recent Results, and Cardiac Rhythm NSR  was reviewed with the receiving nurse. Opportunity for questions and clarification was provided. Assessment completed upon patient's arrival to unit and care assumed. Pt Aox4. Breathing even and unlabored on Bipap. Hypertensive but VS otherwise stable. Nitro gtt dual verified with ER RN.

## 2023-12-02 NOTE — ED NOTES
TRANSFER - OUT REPORT:    Verbal report given to Yue Rodriguez RN on Nalani Fleischer  being transferred to [facility] [phone] CCU for routine progression of patient care       Report consisted of patient's Situation, Background, Assessment and   Recommendations(SBAR). Information from the following report(s) Nurse Handoff Report, Index, ED SBAR, Intake/Output, MAR, Recent Results, and Cardiac Rhythm NSR  was reviewed with the receiving nurse. [place] Fall Assessment:                           Lines:   Peripheral IV 12/02/23 Right Antecubital (Active)       Peripheral IV 12/02/23 Posterior;Right Hand (Active)   Site Assessment Clean, dry & intact 12/02/23 1710   Phlebitis Assessment No symptoms 12/02/23 1710   Infiltration Assessment 0 12/02/23 1710        Opportunity for questions and clarification was provided.       Patient transported with:  Monitor, Arturo, RN, [facility], RN  12/02/23 5507

## 2023-12-02 NOTE — ED PROVIDER NOTES
Emergency Department Provider Note       PCP: None, None   Age: 66 y.o. Sex: female     Ruddy Ling Admitted 12/02/2023 05:19:53 PM       ICD-10-CM    1. Acute congestive heart failure, unspecified heart failure type ([address])  I50.9       2. Hypoxia  R09.02       3. Acute pulmonary edema (HCC)  J81.0 Echo (TTE) complete (PRN contrast/bubble/strain/3D)     Echo (TTE) complete (PRN contrast/bubble/strain/3D)      4. Acute respiratory failure with hypoxia and hypercapnia (HCC)  J96.01 Echo (TTE) complete (PRN contrast/bubble/strain/3D)    J96.02 Echo (TTE) complete (PRN contrast/bubble/strain/3D)          Medical Decision Making     Complexity of Problems Addressed:  1 or more acute illnesses that pose a threat to life or bodily function. Data Reviewed and Analyzed:   I independently ordered and reviewed each unique test.  I reviewed external records: provider visit note from PCP. I reviewed external records: provider visit note from outside specialist.  I reviewed external records: previous EKG including cardiologist interpretation. I reviewed external records: previous lab results from outside ED. The patients assessment required an independent historian: EMS. The reason they were needed is important historical information not provided by the patient. I independently ordered and interpreted the ED EKG in the absence of a Cardiologist.    Rate: 101  EKG Interpretation: EKG Interpretation: sinus rhythm, no evidence of arrhythmia  ST Segments: Normal ST segments - NO STEMI      I independently interpreted the cardiac monitor rhythm strip nsr. I interpreted the X-rays pulmonary edema. Discussion of management or test interpretation. Arrival in the emergency department the patient was placed on nasal cannula as she has a room air saturation in the upper 50s. This was consistent with EMS's findings. Initial gas notable for pco2 70, pao2 of 115.  CXR shows bilateral infiltrates most consistent with WALTER Not detected NOTD      Influenza B, WALTER Not detected NOTD     COVID-19, Rapid    Specimen: Nasopharyngeal   Result Value Ref Range    Source NASAL      SARS-CoV-2, Rapid Not detected NOTD     XR CHEST PORTABLE    Narrative    CHEST X-RAY, single portable view  12/2/2023    History: Shortness of breath. Technique: Single frontal view of the chest.    Comparison: Chest x-ray 4/5/2021    Findings: The cardiac silhouette is increased in size in now mild to moderately enlarged. There is evolving central pulmonary vascular enlargement. .  The lungs are  expanded without evidence for pneumothorax. Evolving symmetric infiltrates are  seen although these appear asymmetrically pronounced in the right lower lung  field. There is a small left basilar effusion. Overall, these findings are most  suggestive of heart failure. Impression    1. Multiple evolving changes which overall are most suggestive of heart failure  as described above. This report was made using voice transcription. Despite my best efforts to avoid  any, transcription errors may persist. If there is any question about the  accuracy of the report or need for clarification, then please call [phone], or text me through [employer]ek[employer] for clarification or correction.     CBC with Auto Differential   Result Value Ref Range    WBC 16.2 (H) 4.3 - 11.1 K/uL    RBC 4.95 4.05 - 5.2 M/uL    Hemoglobin 13.8 11.7 - 15.4 g/dL    Hematocrit 45.9 35.8 - 46.3 %    MCV 92.7 82 - 102 FL    MCH 27.9 26.1 - 32.9 PG    MCHC 30.1 (L) 31.4 - 35.0 g/dL    RDW 16.5 (H) 11.9 - 14.6 %    Platelets 700 438 - 746 K/uL    MPV 11.0 9.4 - 12.3 FL    nRBC 0.00 0.0 - 0.2 K/uL    Differential Type AUTOMATED      Neutrophils % 90 (H) 43 - 78 %    Lymphocytes % 6 (L) 13 - 44 %    Monocytes % 3 (L) 4.0 - 12.0 %    Eosinophils % 0 (L) 0.5 - 7.8 %    Basophils % 0 0.0 - 2.0 %    Immature Granulocytes 1 0.0 - 5.0 %    Neutrophils Absolute 14.6 (H) 1.7 - 8.2 K/UL    Lymphocytes Absolute 0.9 0.5 - 4.6 K/UL    Monocytes Absolute 0.5 0.1 - 1.3 K/UL    Eosinophils Absolute 0.0 0.0 - 0.8 K/UL    Basophils Absolute 0.1 0.0 - 0.2 K/UL    Absolute Immature Granulocyte 0.1 0.0 - 0.5 K/UL   Comprehensive Metabolic Panel   Result Value Ref Range    Sodium 138 133 - 143 mmol/L    Potassium 3.6 3.5 - 5.1 mmol/L    Chloride 108 101 - 110 mmol/L    CO2 33 (H) 21 - 32 mmol/L    Anion Gap NEG 3 2 - 11 mmol/L    Glucose 123 (H) 65 - 100 mg/dL    BUN 13 8 - 23 MG/DL    Creatinine 0.70 0.6 - 1.0 MG/DL    Est, Glom Filt Rate >60 >60 ml/min/1.73m2    Calcium 9.3 8.3 - 10.4 MG/DL    Total Bilirubin 0.5 0.2 - 1.1 MG/DL    ALT 17 12 - 65 U/L    AST 9 (L) 15 - 37 U/L    Alk Phosphatase 148 (H) 50 - 136 U/L    Total Protein 8.1 6.3 - 8.2 g/dL    Albumin 3.0 (L) 3.2 - 4.6 g/dL    Globulin 5.1 (H) 2.8 - 4.5 g/dL    Albumin/Globulin Ratio 0.6 0.4 - 1.6     Magnesium   Result Value Ref Range    Magnesium 2.2 1.8 - 2.4 mg/dL   Troponin   Result Value Ref Range    Troponin, High Sensitivity 32.6 (H) 0 - 14 pg/mL   Lactic Acid   Result Value Ref Range    Lactic Acid, Plasma 1.3 0.4 - 2.0 MMOL/L   Brain Natriuretic Peptide   Result Value Ref Range    NT Pro- (H) 5 - 125 PG/ML   POCT Blood Gas & Electrolytes   Result Value Ref Range    POC pH 7.32 (L) 7.35 - 7.45      POC pCO2 70.2 (HH) 35 - 45 MMHG    POC PO2 115 (H) 75 - 100 MMHG    POC Sodium 147 (H) 136 - 145 MMOL/L    POC Potassium 3.6 3.5 - 5.1 MMOL/L    POC Ionized Calcium 1.22 1.12 - 1.32 mmol/L    POC Glucose 130 (H) 65 - 100 MG/DL    Base Excess 6.9 mmol/L    POC HCO3 36.3 (H) 22 - 26 MMOL/L    POC TCO2 37 (H) 13 - 23 MMOL/L    POC O2 SAT 98 %    Source ARTERIAL      Site RIGHT RADIAL      Darien Test Positive      DEVICE Non rebreather      FIO2 Arterial 95 %    Performed by: Jennie Jain Gap, POC Cannot be calculated  Cannot be calculated   mmol/L    eGFR, POC Cannot be calculated >60 ml/min/1.73m2    Critical Value Read Back AL    POCT

## 2023-12-03 ENCOUNTER — APPOINTMENT (OUTPATIENT)
Dept: GENERAL RADIOLOGY | Age: 63
End: 2023-12-03
Payer: COMMERCIAL

## 2023-12-03 ENCOUNTER — APPOINTMENT (OUTPATIENT)
Dept: NON INVASIVE DIAGNOSTICS | Age: 63
End: 2023-12-03
Attending: INTERNAL MEDICINE
Payer: COMMERCIAL

## 2023-12-03 LAB
ANION GAP SERPL CALC-SCNC: 1 MMOL/L (ref 2–11)
ARTERIAL PATENCY WRIST A: POSITIVE
BASE EXCESS BLD CALC-SCNC: 11.2 MMOL/L
BASOPHILS # BLD: 0 K/UL (ref 0–0.2)
BASOPHILS NFR BLD: 0 % (ref 0–2)
BDY SITE: ABNORMAL
BUN SERPL-MCNC: 14 MG/DL (ref 8–23)
CALCIUM SERPL-MCNC: 8.9 MG/DL (ref 8.3–10.4)
CHLORIDE SERPL-SCNC: 106 MMOL/L (ref 101–110)
CO2 SERPL-SCNC: 35 MMOL/L (ref 21–32)
CREAT SERPL-MCNC: 0.6 MG/DL (ref 0.6–1)
DIFFERENTIAL METHOD BLD: ABNORMAL
ECHO AO ASC DIAM: 3 CM
ECHO AO ASCENDING AORTA INDEX: 1.32 CM/M2
ECHO AO ROOT DIAM: 2.9 CM
ECHO AO ROOT INDEX: 1.27 CM/M2
ECHO AV AREA PEAK VELOCITY: 2.6 CM2
ECHO AV AREA VTI: 2.5 CM2
ECHO AV AREA/BSA PEAK VELOCITY: 1.1 CM2/M2
ECHO AV AREA/BSA VTI: 1.1 CM2/M2
ECHO AV MEAN GRADIENT: 3 MMHG
ECHO AV MEAN VELOCITY: 0.8 M/S
ECHO AV PEAK GRADIENT: 5 MMHG
ECHO AV PEAK VELOCITY: 1.2 M/S
ECHO AV VELOCITY RATIO: 0.75
ECHO AV VTI: 23.5 CM
ECHO BSA: 2.42 M2
ECHO IVC PROX: 2.7 CM
ECHO LA AREA 4C: 19.7 CM2
ECHO LA DIAMETER INDEX: 1.14 CM/M2
ECHO LA DIAMETER: 2.6 CM
ECHO LA MAJOR AXIS: 5 CM
ECHO LA TO AORTIC ROOT RATIO: 0.9
ECHO LA VOL MOD A4C: 62 ML (ref 22–52)
ECHO LA VOLUME INDEX MOD A4C: 27 ML/M2 (ref 16–34)
ECHO LV E' LATERAL VELOCITY: 6 CM/S
ECHO LV E' SEPTAL VELOCITY: 10 CM/S
ECHO LV FRACTIONAL SHORTENING: 36 % (ref 28–44)
ECHO LV INTERNAL DIMENSION DIASTOLE INDEX: 2.32 CM/M2
ECHO LV INTERNAL DIMENSION DIASTOLIC: 5.3 CM (ref 3.9–5.3)
ECHO LV INTERNAL DIMENSION SYSTOLIC INDEX: 1.49 CM/M2
ECHO LV INTERNAL DIMENSION SYSTOLIC: 3.4 CM
ECHO LV IVSD: 1.3 CM (ref 0.6–0.9)
ECHO LV MASS 2D: 286.9 G (ref 67–162)
ECHO LV MASS INDEX 2D: 125.9 G/M2 (ref 43–95)
ECHO LV POSTERIOR WALL DIASTOLIC: 1.3 CM (ref 0.6–0.9)
ECHO LV RELATIVE WALL THICKNESS RATIO: 0.49
ECHO LVOT AREA: 3.1 CM2
ECHO LVOT AV VTI INDEX: 0.8
ECHO LVOT DIAM: 2 CM
ECHO LVOT MEAN GRADIENT: 2 MMHG
ECHO LVOT PEAK GRADIENT: 4 MMHG
ECHO LVOT PEAK VELOCITY: 0.9 M/S
ECHO LVOT STROKE VOLUME INDEX: 25.9 ML/M2
ECHO LVOT SV: 59 ML
ECHO LVOT VTI: 18.8 CM
ECHO MV A VELOCITY: 0.78 M/S
ECHO MV E DECELERATION TIME (DT): 234 MS
ECHO MV E VELOCITY: 0.66 M/S
ECHO MV E/A RATIO: 0.85
ECHO MV E/E' LATERAL: 11
ECHO MV E/E' RATIO (AVERAGED): 8.8
ECHO PV MAX VELOCITY: 1 M/S
ECHO PV PEAK GRADIENT: 4 MMHG
EOSINOPHIL # BLD: 0 K/UL (ref 0–0.8)
EOSINOPHIL NFR BLD: 0 % (ref 0.5–7.8)
ERYTHROCYTE [DISTWIDTH] IN BLOOD BY AUTOMATED COUNT: 16.6 % (ref 11.9–14.6)
EST. AVERAGE GLUCOSE BLD GHB EST-MCNC: 114 MG/DL
GAS FLOW.O2 O2 DELIVERY SYS: ABNORMAL
GLUCOSE SERPL-MCNC: 102 MG/DL (ref 65–100)
HBA1C MFR BLD: 5.6 % (ref 4.8–5.6)
HCO3 BLD-SCNC: 37.9 MMOL/L (ref 22–26)
HCT VFR BLD AUTO: 40.7 % (ref 35.8–46.3)
HGB BLD-MCNC: 12.2 G/DL (ref 11.7–15.4)
IMM GRANULOCYTES # BLD AUTO: 0.1 K/UL (ref 0–0.5)
IMM GRANULOCYTES NFR BLD AUTO: 1 % (ref 0–5)
LYMPHOCYTES # BLD: 1.3 K/UL (ref 0.5–4.6)
LYMPHOCYTES NFR BLD: 10 % (ref 13–44)
MAGNESIUM SERPL-MCNC: 2.1 MG/DL (ref 1.8–2.4)
MCH RBC QN AUTO: 27.9 PG (ref 26.1–32.9)
MCHC RBC AUTO-ENTMCNC: 30 G/DL (ref 31.4–35)
MCV RBC AUTO: 93.1 FL (ref 82–102)
MONOCYTES # BLD: 0.8 K/UL (ref 0.1–1.3)
MONOCYTES NFR BLD: 6 % (ref 4–12)
NEUTS SEG # BLD: 11.4 K/UL (ref 1.7–8.2)
NEUTS SEG NFR BLD: 83 % (ref 43–78)
NRBC # BLD: 0 K/UL (ref 0–0.2)
O2/TOTAL GAS SETTING VFR VENT: 45 %
PCO2 BLD: 57.5 MMHG (ref 35–45)
PEEP RESPIRATORY: 8 CMH2O
PH BLD: 7.43 (ref 7.35–7.45)
PLATELET # BLD AUTO: 224 K/UL (ref 150–450)
PMV BLD AUTO: 10.8 FL (ref 9.4–12.3)
PO2 BLD: 56 MMHG (ref 75–100)
POTASSIUM SERPL-SCNC: 3.4 MMOL/L (ref 3.5–5.1)
PRESSURE SUPPORT SETTING VENT: 20 CMH2O
RBC # BLD AUTO: 4.37 M/UL (ref 4.05–5.2)
RESPIRATORY RATE, POC: 24 (ref 5–40)
SAO2 % BLD: 88.3 % (ref 95–98)
SERVICE CMNT-IMP: ABNORMAL
SERVICE CMNT-IMP: ABNORMAL
SODIUM SERPL-SCNC: 142 MMOL/L (ref 133–143)
SPECIMEN TYPE: ABNORMAL
VENTILATION MODE VENT: ABNORMAL
WBC # BLD AUTO: 13.7 K/UL (ref 4.3–11.1)

## 2023-12-03 PROCEDURE — C8929 TTE W OR WO FOL WCON,DOPPLER: HCPCS

## 2023-12-03 PROCEDURE — 94660 CPAP INITIATION&MGMT: CPT

## 2023-12-03 PROCEDURE — 6370000000 HC RX 637 (ALT 250 FOR IP): Performed by: EMERGENCY MEDICINE

## 2023-12-03 PROCEDURE — 6360000002 HC RX W HCPCS: Performed by: INTERNAL MEDICINE

## 2023-12-03 PROCEDURE — 36415 COLL VENOUS BLD VENIPUNCTURE: CPT

## 2023-12-03 PROCEDURE — 2580000003 HC RX 258: Performed by: INTERNAL MEDICINE

## 2023-12-03 PROCEDURE — 83036 HEMOGLOBIN GLYCOSYLATED A1C: CPT

## 2023-12-03 PROCEDURE — 85025 COMPLETE CBC W/AUTO DIFF WBC: CPT

## 2023-12-03 PROCEDURE — 6370000000 HC RX 637 (ALT 250 FOR IP): Performed by: INTERNAL MEDICINE

## 2023-12-03 PROCEDURE — 36600 WITHDRAWAL OF ARTERIAL BLOOD: CPT

## 2023-12-03 PROCEDURE — 83735 ASSAY OF MAGNESIUM: CPT

## 2023-12-03 PROCEDURE — 82803 BLOOD GASES ANY COMBINATION: CPT

## 2023-12-03 PROCEDURE — 99291 CRITICAL CARE FIRST HOUR: CPT | Performed by: INTERNAL MEDICINE

## 2023-12-03 PROCEDURE — 6360000004 HC RX CONTRAST MEDICATION: Performed by: INTERNAL MEDICINE

## 2023-12-03 PROCEDURE — A4216 STERILE WATER/SALINE, 10 ML: HCPCS | Performed by: INTERNAL MEDICINE

## 2023-12-03 PROCEDURE — 93306 TTE W/DOPPLER COMPLETE: CPT | Performed by: INTERNAL MEDICINE

## 2023-12-03 PROCEDURE — 6370000000 HC RX 637 (ALT 250 FOR IP)

## 2023-12-03 PROCEDURE — 80048 BASIC METABOLIC PNL TOTAL CA: CPT

## 2023-12-03 PROCEDURE — 2100000000 HC CCU R&B

## 2023-12-03 PROCEDURE — 2700000000 HC OXYGEN THERAPY PER DAY

## 2023-12-03 RX ORDER — ZOLPIDEM TARTRATE 5 MG/1
5 TABLET ORAL ONCE
Status: COMPLETED | OUTPATIENT
Start: 2023-12-03 | End: 2023-12-03

## 2023-12-03 RX ORDER — POTASSIUM CHLORIDE 20 MEQ/1
40 TABLET, EXTENDED RELEASE ORAL ONCE
Status: COMPLETED | OUTPATIENT
Start: 2023-12-03 | End: 2023-12-03

## 2023-12-03 RX ORDER — DIMETHICONE, CAMPHOR (SYNTHETIC), MENTHOL, AND PHENOL 1.1; .5; .625; .5 G/100G; G/100G; G/100G; G/100G
OINTMENT TOPICAL PRN
Status: DISCONTINUED | OUTPATIENT
Start: 2023-12-03 | End: 2023-12-08 | Stop reason: HOSPADM

## 2023-12-03 RX ORDER — HYDRALAZINE HYDROCHLORIDE 20 MG/ML
20 INJECTION INTRAMUSCULAR; INTRAVENOUS EVERY 6 HOURS PRN
Status: DISCONTINUED | OUTPATIENT
Start: 2023-12-03 | End: 2023-12-08 | Stop reason: HOSPADM

## 2023-12-03 RX ORDER — LISINOPRIL 20 MG/1
40 TABLET ORAL DAILY
Status: DISCONTINUED | OUTPATIENT
Start: 2023-12-04 | End: 2023-12-08 | Stop reason: HOSPADM

## 2023-12-03 RX ORDER — AMLODIPINE BESYLATE 10 MG/1
10 TABLET ORAL DAILY
Status: DISCONTINUED | OUTPATIENT
Start: 2023-12-03 | End: 2023-12-03

## 2023-12-03 RX ADMIN — SODIUM CHLORIDE, PRESERVATIVE FREE 10 ML: 5 INJECTION INTRAVENOUS at 20:18

## 2023-12-03 RX ADMIN — POTASSIUM CHLORIDE 40 MEQ: 1500 TABLET, EXTENDED RELEASE ORAL at 09:58

## 2023-12-03 RX ADMIN — METOPROLOL TARTRATE 25 MG: 25 TABLET, FILM COATED ORAL at 12:23

## 2023-12-03 RX ADMIN — METOPROLOL TARTRATE 25 MG: 25 TABLET, FILM COATED ORAL at 20:17

## 2023-12-03 RX ADMIN — FUROSEMIDE 40 MG: 10 INJECTION, SOLUTION INTRAMUSCULAR; INTRAVENOUS at 08:55

## 2023-12-03 RX ADMIN — SODIUM CHLORIDE, PRESERVATIVE FREE 10 ML: 5 INJECTION INTRAVENOUS at 08:56

## 2023-12-03 RX ADMIN — ENOXAPARIN SODIUM 30 MG: 100 INJECTION SUBCUTANEOUS at 20:17

## 2023-12-03 RX ADMIN — HYDRALAZINE HYDROCHLORIDE 20 MG: 20 INJECTION, SOLUTION INTRAMUSCULAR; INTRAVENOUS at 11:40

## 2023-12-03 RX ADMIN — ONDANSETRON 4 MG: 4 TABLET, ORALLY DISINTEGRATING ORAL at 20:17

## 2023-12-03 RX ADMIN — LISINOPRIL 20 MG: 20 TABLET ORAL at 08:55

## 2023-12-03 RX ADMIN — ACETAMINOPHEN 650 MG: 325 TABLET ORAL at 12:23

## 2023-12-03 RX ADMIN — Medication: at 01:29

## 2023-12-03 RX ADMIN — PERFLUTREN 0.45 ML: 6.52 INJECTION, SUSPENSION INTRAVENOUS at 08:20

## 2023-12-03 RX ADMIN — ZOLPIDEM TARTRATE 5 MG: 5 TABLET ORAL at 20:39

## 2023-12-03 RX ADMIN — HYDROCHLOROTHIAZIDE 25 MG: 25 TABLET ORAL at 08:55

## 2023-12-03 RX ADMIN — ENOXAPARIN SODIUM 30 MG: 100 INJECTION SUBCUTANEOUS at 08:55

## 2023-12-03 ASSESSMENT — PAIN SCALES - GENERAL
PAINLEVEL_OUTOF10: 0

## 2023-12-03 NOTE — PROGRESS NOTES
If you need to see a   -  just ask the nurse to call us. We look forward to serving your family!!         Vijay Francis

## 2023-12-03 NOTE — PROGRESS NOTES
Patient placed on V60. Connected to the Nurse Call System and Continuous Pulse Oximetry utilizing Vital Sync. Alarms are activated and nurse has been notified. Documentation completed.       12/02/23 1930   NIV Type   NIV Started/Stopped On   Equipment Type v60   Mode Bilevel   Mask Type Under the nose   Mask Size Medium   Assessment   Pulse 95   Respirations 24   BP (!) 187/106   SpO2 99 %   Comfort Level Fair   Using Accessory Muscles No   Mask Compliance Good   Skin Assessment Clean, dry, & intact   Skin Protection for O2 Device No   Breath Sounds   Breath Sounds Bilateral Diminished   Settings/Measurements   PIP Observed 22 cm H20   IPAP 20 cmH20   CPAP/EPAP 10 cmH2O   Vt (Measured) 596 mL   Rate Ordered 16   Insp Rise Time (%) 3 %   FiO2  60 %   I Time/ I Time % 0.9 s   Minute Volume (L/min) 15.6 Liters   Mask Leak (lpm) 31 lpm   Patient's Home Machine No   Alarm Settings   Alarms On Y   Low Pressure (cmH2O) 5 cmH2O   High Pressure (cmH2O) 35 cmH2O   Apnea (secs) 20 secs   RR Low (bpm) 8   RR High (bpm) 50 br/min

## 2023-12-03 NOTE — INTERDISCIPLINARY ROUNDS
Multi-D Rounds/Checklist (leapfrog):  Lines: can any be removed?: None    External Urinary Catheter (Active)      DVT Prophylaxis: Ordered  Vent: N/A  Nutrition Ordered/appropriate: Ordered  Can antibiotics or other drugs be stopped? N/A Yes/No  Inpat Anti-Infectives (From admission, onward)      None          Consults needed: None  A: Is pain control adequate? (has PRNs? Stop drip?) N/A  B: Sedation break and SBT? N/A  C: Is sedation choice appropriate? N/A  D: Delirium/CAM-ICU? No  E: Mobility goals/appropriateness? Yes  F: Family update and plan? Son is surrogate decision maker and is being updated daily by primary attending and nursing staff.     Praneeth Brain, APRN - CNP

## 2023-12-04 ENCOUNTER — APPOINTMENT (OUTPATIENT)
Dept: GENERAL RADIOLOGY | Age: 63
End: 2023-12-04
Payer: COMMERCIAL

## 2023-12-04 LAB
ANION GAP SERPL CALC-SCNC: ABNORMAL MMOL/L (ref 2–11)
BASOPHILS # BLD: 0 K/UL (ref 0–0.2)
BASOPHILS NFR BLD: 0 % (ref 0–2)
BUN SERPL-MCNC: 14 MG/DL (ref 8–23)
CALCIUM SERPL-MCNC: 8.7 MG/DL (ref 8.3–10.4)
CHLORIDE SERPL-SCNC: 101 MMOL/L (ref 101–110)
CO2 SERPL-SCNC: 40 MMOL/L (ref 21–32)
CREAT SERPL-MCNC: 0.7 MG/DL (ref 0.6–1)
DIFFERENTIAL METHOD BLD: ABNORMAL
EOSINOPHIL # BLD: 0.1 K/UL (ref 0–0.8)
EOSINOPHIL NFR BLD: 1 % (ref 0.5–7.8)
ERYTHROCYTE [DISTWIDTH] IN BLOOD BY AUTOMATED COUNT: 16.5 % (ref 11.9–14.6)
GLUCOSE SERPL-MCNC: 109 MG/DL (ref 65–100)
HCT VFR BLD AUTO: 41.7 % (ref 35.8–46.3)
HGB BLD-MCNC: 12.5 G/DL (ref 11.7–15.4)
IMM GRANULOCYTES # BLD AUTO: 0.1 K/UL (ref 0–0.5)
IMM GRANULOCYTES NFR BLD AUTO: 0 % (ref 0–5)
LYMPHOCYTES # BLD: 1.2 K/UL (ref 0.5–4.6)
LYMPHOCYTES NFR BLD: 9 % (ref 13–44)
MAGNESIUM SERPL-MCNC: 2.3 MG/DL (ref 1.8–2.4)
MCH RBC QN AUTO: 27.9 PG (ref 26.1–32.9)
MCHC RBC AUTO-ENTMCNC: 30 G/DL (ref 31.4–35)
MCV RBC AUTO: 93.1 FL (ref 82–102)
MONOCYTES # BLD: 1.1 K/UL (ref 0.1–1.3)
MONOCYTES NFR BLD: 7 % (ref 4–12)
NEUTS SEG # BLD: 12.1 K/UL (ref 1.7–8.2)
NEUTS SEG NFR BLD: 83 % (ref 43–78)
NRBC # BLD: 0 K/UL (ref 0–0.2)
PLATELET # BLD AUTO: 235 K/UL (ref 150–450)
PMV BLD AUTO: 10.6 FL (ref 9.4–12.3)
POTASSIUM SERPL-SCNC: 3.1 MMOL/L (ref 3.5–5.1)
RBC # BLD AUTO: 4.48 M/UL (ref 4.05–5.2)
SODIUM SERPL-SCNC: 140 MMOL/L (ref 133–143)
WBC # BLD AUTO: 14.7 K/UL (ref 4.3–11.1)

## 2023-12-04 PROCEDURE — 71045 X-RAY EXAM CHEST 1 VIEW: CPT

## 2023-12-04 PROCEDURE — 6360000002 HC RX W HCPCS: Performed by: INTERNAL MEDICINE

## 2023-12-04 PROCEDURE — 94660 CPAP INITIATION&MGMT: CPT

## 2023-12-04 PROCEDURE — 6370000000 HC RX 637 (ALT 250 FOR IP): Performed by: EMERGENCY MEDICINE

## 2023-12-04 PROCEDURE — 97530 THERAPEUTIC ACTIVITIES: CPT

## 2023-12-04 PROCEDURE — 85025 COMPLETE CBC W/AUTO DIFF WBC: CPT

## 2023-12-04 PROCEDURE — 2100000000 HC CCU R&B

## 2023-12-04 PROCEDURE — 6370000000 HC RX 637 (ALT 250 FOR IP): Performed by: INTERNAL MEDICINE

## 2023-12-04 PROCEDURE — 83735 ASSAY OF MAGNESIUM: CPT

## 2023-12-04 PROCEDURE — 6370000000 HC RX 637 (ALT 250 FOR IP)

## 2023-12-04 PROCEDURE — 94761 N-INVAS EAR/PLS OXIMETRY MLT: CPT

## 2023-12-04 PROCEDURE — 36415 COLL VENOUS BLD VENIPUNCTURE: CPT

## 2023-12-04 PROCEDURE — 99291 CRITICAL CARE FIRST HOUR: CPT | Performed by: INTERNAL MEDICINE

## 2023-12-04 PROCEDURE — 97161 PT EVAL LOW COMPLEX 20 MIN: CPT

## 2023-12-04 PROCEDURE — 2580000003 HC RX 258: Performed by: INTERNAL MEDICINE

## 2023-12-04 PROCEDURE — 2700000000 HC OXYGEN THERAPY PER DAY

## 2023-12-04 PROCEDURE — 80048 BASIC METABOLIC PNL TOTAL CA: CPT

## 2023-12-04 PROCEDURE — 97535 SELF CARE MNGMENT TRAINING: CPT

## 2023-12-04 PROCEDURE — 97165 OT EVAL LOW COMPLEX 30 MIN: CPT

## 2023-12-04 RX ORDER — POTASSIUM CHLORIDE 20 MEQ/1
40 TABLET, EXTENDED RELEASE ORAL PRN
Status: DISCONTINUED | OUTPATIENT
Start: 2023-12-04 | End: 2023-12-05

## 2023-12-04 RX ORDER — POTASSIUM CHLORIDE 7.45 MG/ML
10 INJECTION INTRAVENOUS PRN
Status: DISCONTINUED | OUTPATIENT
Start: 2023-12-04 | End: 2023-12-05

## 2023-12-04 RX ORDER — ZOLPIDEM TARTRATE 5 MG/1
5 TABLET ORAL ONCE
Status: COMPLETED | OUTPATIENT
Start: 2023-12-04 | End: 2023-12-04

## 2023-12-04 RX ADMIN — SODIUM CHLORIDE, PRESERVATIVE FREE 10 ML: 5 INJECTION INTRAVENOUS at 08:13

## 2023-12-04 RX ADMIN — METOPROLOL TARTRATE 25 MG: 25 TABLET, FILM COATED ORAL at 08:14

## 2023-12-04 RX ADMIN — POTASSIUM BICARBONATE 40 MEQ: 782 TABLET, EFFERVESCENT ORAL at 07:47

## 2023-12-04 RX ADMIN — SODIUM CHLORIDE, PRESERVATIVE FREE 10 ML: 5 INJECTION INTRAVENOUS at 19:49

## 2023-12-04 RX ADMIN — LISINOPRIL 40 MG: 20 TABLET ORAL at 08:14

## 2023-12-04 RX ADMIN — ENOXAPARIN SODIUM 30 MG: 100 INJECTION SUBCUTANEOUS at 08:13

## 2023-12-04 RX ADMIN — ZOLPIDEM TARTRATE 5 MG: 5 TABLET ORAL at 22:01

## 2023-12-04 RX ADMIN — METOPROLOL TARTRATE 25 MG: 25 TABLET, FILM COATED ORAL at 19:48

## 2023-12-04 RX ADMIN — HYDROCHLOROTHIAZIDE 25 MG: 25 TABLET ORAL at 08:14

## 2023-12-04 RX ADMIN — FUROSEMIDE 40 MG: 10 INJECTION, SOLUTION INTRAMUSCULAR; INTRAVENOUS at 08:15

## 2023-12-04 RX ADMIN — ENOXAPARIN SODIUM 30 MG: 100 INJECTION SUBCUTANEOUS at 21:00

## 2023-12-04 ASSESSMENT — PAIN SCALES - GENERAL
PAINLEVEL_OUTOF10: 0

## 2023-12-04 NOTE — PROGRESS NOTES
PT was in chair with Son at bedside. [id number] [id number] [facility] introduced self. PT expressed gratitude to God to be feeling better and requested prayer. PT expressed importance of and comfort in Lisbet and Prayer. PT is a Khris and has attended [employer]-S[employer] and [employer] congregations. CH offered prayer. [id number] [id number] [facility] checked for unmet needs and offered support. Rev. Darrian Call M.Div.

## 2023-12-04 NOTE — PROGRESS NOTES
ACUTE PHYSICAL THERAPY GOALS:   (Developed with and agreed upon by patient and/or caregiver. )  LTG:  (1.)Ms. Deven Rose will move from supine to sit and sit to supine , scoot up and down, and roll side to side in bed with INDEPENDENCE within 7 treatment day(s). (2.)Ms. Deven Rose will transfer from bed to chair and chair to bed with MODIFIED INDEPENDENCE using the least restrictive device within 7 treatment day(s). (3.)Ms. Deven Rose will ambulate with MODIFIED INDEPENDENCE for 100 feet with the least restrictive device within 7 treatment day(s). (4.)Ms. Deven Rose will participate in therapeutic activity/exercises x 25 minutes for increased activity tolerance with SpO2 above 90% within 7 treatment days. (5.)Ms. Deven Rose will propel manual wheelchair for 200 ft with supervision for improved independence within 7 treatment days. ________________________________________________________________________________________________        PHYSICAL THERAPY Initial Assessment and AM  (Link to Caseload Tracking: PT Visit Days : 1  Acknowledge Orders  Time In/Out  PT Charge Capture  Rehab Caseload Tracker    Claudio Gardner is a 66 y.o. female   PRIMARY DIAGNOSIS: Acute respiratory failure with hypoxia and hypercapnia (HCC)  Acute pulmonary edema (HCC) [J81.0]  Hypoxia [R09.02]  Acute respiratory failure with hypoxia and hypercapnia (HCC) [J96.01, J96.02]  Acute congestive heart failure, unspecified heart failure type ([address]) [I50.9]       Reason for Referral: Generalized Muscle Weakness (M62.81)  Difficulty in walking, Not elsewhere classified (R26.2)  Inpatient: Payor: Luke Valdez / Plan: Luke Valdez / Product Type: *No Product type* /     ASSESSMENT:     REHAB RECOMMENDATIONS:   Recommendation to date pending progress:  Setting:  Home Health Therapy    Equipment:    To Be Determined     ASSESSMENT:  Ms. Deven Rose was admitted to the hospital for SOB with a history of HTN and osteomyelitis of spine.   She presents to PT with [facility] and Transfer Abilities INTERVENTIONS PLANNED:   (Benefits and precautions of physical therapy have been discussed with the patient.)  Therapeutic Activity  Therapeutic Exercise/HEP  Neuromuscular Re-education  Gait Training  Education       TREATMENT:   EVALUATION: LOW COMPLEXITY: (Untimed Charge)    TREATMENT:   Co-Treatment PT/OT necessary due to patient's decreased overall endurance/tolerance levels, as well as need for high level skilled assistance to complete functional transfers/mobility and functional tasks  Therapeutic Activity (23 Minutes): Therapeutic activity included Supine to Sit, Scooting, Transfer Training, Ambulation on level ground, Sitting balance , and Standing balance to improve functional Activity tolerance, Balance, Mobility, and Strength. TREATMENT GRID:  N/A    AFTER TREATMENT PRECAUTIONS: Bed/Chair Locked, Call light within reach, Chair, Needs within reach, and RN notified    INTERDISCIPLINARY COLLABORATION:  RN/ PCT, PT/ PTA, and OT/ OKEEFE    EDUCATION: Education Given To: Patient  Education Provided: Role of Therapy    TIME IN/OUT:  Time In: 1002  Time Out: [address]  Minutes: [address].  Doris Anders

## 2023-12-04 NOTE — PROGRESS NOTES
ACUTE OCCUPATIONAL THERAPY GOALS:   (Developed with and agreed upon by patient and/or caregiver.)  1. Patient will complete lower body bathing and dressing with MOD I and adaptive equipment as needed. 2. Patient will complete toileting with MOD I.   3. Patient will tolerate 30 minutes of OT treatment with 1-2 rest breaks to increase activity tolerance for ADLs. 4. Patient will complete functional transfers with MOD I and adaptive equipment as needed. 5. Patient will complete functional mobility for household distances with MOD I and adaptive equipment as needed. 6. Patient will complete self-grooming while standing edge of sink with MOD I and adaptive equipment as needed. Timeframe: 7 visits       OCCUPATIONAL THERAPY Initial Assessment, Daily Note, and AM       OT Visit Days: 1   Acknowledge Orders  Time  OT Charge Capture  Rehab Caseload Tracker      Jon Garner is a 66 y.o. female   PRIMARY DIAGNOSIS: Acute respiratory failure with hypoxia and hypercapnia (HCC)  Acute pulmonary edema (HCC) [J81.0]  Hypoxia [R09.02]  Acute respiratory failure with hypoxia and hypercapnia (HCC) [J96.01, J96.02]  Acute congestive heart failure, unspecified heart failure type ([address]) [I50.9]       Reason for Referral: Generalized Muscle Weakness (M62.81)  Inpatient: Payor: Claude Kapoor / Plan: Claude Kapoor / Product Type: *No Product type* /     ASSESSMENT:     REHAB RECOMMENDATIONS:   Recommendation to date pending progress:  Setting:  No anticipated OT needs    Equipment:    None     ASSESSMENT:  Ms. Bony Brewster presents with deficits in overall strength, activity tolerance, activities of daily living performance, and functional mobility. Presents in ICU for ARF and CHF after ongoing SOB. Resting on 10L 02 and doing well. Today, BUE are generally decreased but WFL. Min A  for functional bed mobility/supine <> sit transfer to edge of bed; intact unsupported edge of bed sitting balance. SPT to [facility] completed with CGA x 2.

## 2023-12-04 NOTE — PROGRESS NOTES
Patient placed on V60. Connected to the Nurse Call System and Continuous Pulse Oximetry utilizing Vital Sync. Alarms are activated and nurse has been notified. Documentation completed.       12/03/23 2148   NIV Type   Ventilator ID [id number]   NIV Started/Stopped On   Equipment Type v60   Mode Bilevel   Mask Type Under the nose   Mask Size Medium   Assessment   Comfort Level Good   Using Accessory Muscles No   Mask Compliance Good   Skin Assessment Clean, dry, & intact   Skin Protection for O2 Device No   Breath Sounds   Breath Sounds Bilateral Diminished   Settings/Measurements   PIP Observed 18 cm H20   IPAP 8 cmH20   Vt (Measured) 527 mL   Rate Ordered 24   Insp Rise Time (%) 3 %   FiO2  45 %   I Time/ I Time % 0.9 s   Minute Volume (L/min) 13.2 Liters   Mask Leak (lpm) 39 lpm   Patient's Home Machine No   Alarm Settings   Alarms On Y   Low Pressure (cmH2O) 5 cmH2O   High Pressure (cmH2O) 35 cmH2O   Apnea (secs) 20 secs   RR Low (bpm) 8   RR High (bpm) 50 br/min

## 2023-12-04 NOTE — CARE COORDINATION
Case Management Assessment  Initial Evaluation    Date/Time of Evaluation: 12/4/2023 1:47 PM  Assessment Completed by: Pedro Martinez RN    If patient is discharged prior to next notation, then this note serves as note for discharge by case management. Patient Name: Tika Villela                   YOB: 1960  Diagnosis: Acute pulmonary edema ([address]) [J81.0]  Hypoxia [R09.02]  Acute respiratory failure with hypoxia and hypercapnia ([address]) [J96.01, J96.02]  Acute congestive heart failure, unspecified heart failure type ([address]) [I50.9]                   Date / Time: 12/2/2023  1:00 PM    Patient Admission Status: Inpatient   Readmission Risk (Low < 19, Mod (19-27), High > 27): Readmission Risk Score: 12    Current PCP: None, None  PCP verified by CM? (P) Yes    Chart Reviewed: Yes      History Provided by: (P) Patient  Patient Orientation: (P) Alert and Oriented    Patient Cognition: (P) Alert    Hospitalization in the last 30 days (Readmission):  No    If yes, Readmission Assessment in CM Navigator will be completed.     Advance Directives:      Code Status: Full Code   Patient's Primary Decision Maker is: (P) Legal Next of Kin      Discharge Planning:    Patient lives with: (P) Children Type of Home: (P) Other (Comment) (pending)  Primary Care Giver: (P) Self  Patient Support Systems include: (P) Children, Family Members   Current Financial resources: (P) Other (Comment) (AMBETTER)  Current community resources: (P) None  Current services prior to admission: (P) Durable Medical Equipment            Current DME: (P) Marnette Hives, Wheelchair /Rolator for walker            Type of Home Care services:       ADLS  Prior functional level: (P) Independent in ADLs/IADLs  Current functional level: (P) Assistance with the following:    PT AM-PAC:   /24  OT AM-PAC:   /24    Family can provide assistance at DC: (P) Yes  Would you like Case Management to discuss the discharge plan with any other family members/significant others, and if so, who? (P) Yes  Plans to Return to Present Housing: (P) Unknown at present  Other Identified Issues/Barriers to RETURNING to current housing: pending  Potential Assistance needed at discharge: (P) Home Care            Potential DME:  pending  Patient expects to discharge to:  pending  Plan for transportation at discharge:  pending    Financial    Payor: Raina Ayoub / Plan: Raina Aoyub / Product Type: *No Product type* /     Does insurance require precert for SNF: Yes    Potential assistance Purchasing Medications: (P) No  Meds-to-Beds request:  n/a    No Pharmacies Listed    Notes:    Factors facilitating achievement of predicted outcomes: Family support, Cooperative, Pleasant, and Good insight into deficits    Barriers to discharge: pending medical treatment    Additional Case Management Notes: Pt seen in CCU alert and oriented. Confirms demographics/screen. SonMikhail lives with her. Independent of ADL's. Rolator and W/c for ambulation. No current HH or rehab. Family provides transport. Confirms insurance/PCP. Discussed poss needs for d/c of HH. Agrees with [facility] if needs. Aware CM to follow.

## 2023-12-04 NOTE — ACP (ADVANCE CARE PLANNING)
Advance Care Planning     Advance Care Planning Activator (Inpatient)  Conversation Note      Date of ACP Conversation: 12/4/2023       ACP Activator: Shelley Esquivel RN    {When Decision Maker makes decisions on behalf of the incapacitated patient: Decision Maker is asked to consider and make decisions based on patient values, known preferences, or best interests. Health Care Decision Maker: Sbaina LW/HCPOA. Single, 2 adult sons, both are legal NOK unless document presented or completed stating otherwise. Current Designated Health Care Decision Maker: Lawanda Schroeder =[phone]    Click here to complete Healthcare Decision Makers including section of the Healthcare Decision Maker Relationship (ie \"Primary\")  Today we documented Decision Maker(s) consistent with Legal Next of Kin hierarchy.

## 2023-12-05 LAB
ANION GAP BLD CALC-SCNC: ABNORMAL MMOL/L
ANION GAP SERPL CALC-SCNC: 2 MMOL/L (ref 2–11)
ARTERIAL PATENCY WRIST A: POSITIVE
BASE EXCESS BLD CALC-SCNC: 17.4 MMOL/L
BASOPHILS # BLD: 0.1 K/UL (ref 0–0.2)
BASOPHILS NFR BLD: 0 % (ref 0–2)
BDY SITE: ABNORMAL
BUN SERPL-MCNC: 22 MG/DL (ref 8–23)
CA-I BLD-MCNC: 1.13 MMOL/L (ref 1.12–1.32)
CALCIUM SERPL-MCNC: 8.4 MG/DL (ref 8.3–10.4)
CHLORIDE SERPL-SCNC: 97 MMOL/L (ref 103–113)
CO2 BLD-SCNC: 45 MMOL/L (ref 13–23)
CO2 SERPL-SCNC: 40 MMOL/L (ref 21–32)
CREAT SERPL-MCNC: 0.85 MG/DL (ref 0.6–1)
DIFFERENTIAL METHOD BLD: ABNORMAL
EOSINOPHIL # BLD: 0.7 K/UL (ref 0–0.8)
EOSINOPHIL NFR BLD: 5 % (ref 0.5–7.8)
ERYTHROCYTE [DISTWIDTH] IN BLOOD BY AUTOMATED COUNT: 16.5 % (ref 11.9–14.6)
FIO2 ON VENT: 45 %
GAS FLOW.O2 O2 DELIVERY SYS: ABNORMAL
GLUCOSE BLD STRIP.AUTO-MCNC: 90 MG/DL (ref 65–100)
GLUCOSE SERPL-MCNC: 94 MG/DL (ref 65–100)
HCO3 BLD-SCNC: 45.4 MMOL/L (ref 22–26)
HCT VFR BLD AUTO: 41.4 % (ref 35.8–46.3)
HGB BLD-MCNC: 12.4 G/DL (ref 11.7–15.4)
IMM GRANULOCYTES # BLD AUTO: 0.1 K/UL (ref 0–0.5)
IMM GRANULOCYTES NFR BLD AUTO: 0 % (ref 0–5)
IPAP/PIP: 21
LYMPHOCYTES # BLD: 1.9 K/UL (ref 0.5–4.6)
LYMPHOCYTES NFR BLD: 14 % (ref 13–44)
MAGNESIUM SERPL-MCNC: 2.3 MG/DL (ref 1.8–2.4)
MCH RBC QN AUTO: 28.1 PG (ref 26.1–32.9)
MCHC RBC AUTO-ENTMCNC: 30 G/DL (ref 31.4–35)
MCV RBC AUTO: 93.7 FL (ref 82–102)
MONOCYTES # BLD: 1 K/UL (ref 0.1–1.3)
MONOCYTES NFR BLD: 7 % (ref 4–12)
NEUTS SEG # BLD: 9.4 K/UL (ref 1.7–8.2)
NEUTS SEG NFR BLD: 72 % (ref 43–78)
NRBC # BLD: 0 K/UL (ref 0–0.2)
PCO2 BLD: 64.8 MMHG (ref 35–45)
PEEP RESPIRATORY: 8
PH BLD: 7.45 (ref 7.35–7.45)
PLATELET # BLD AUTO: 237 K/UL (ref 150–450)
PMV BLD AUTO: 11.3 FL (ref 9.4–12.3)
PO2 BLD: 75 MMHG (ref 75–100)
POTASSIUM BLD-SCNC: 3.3 MMOL/L (ref 3.5–5.1)
POTASSIUM SERPL-SCNC: 3 MMOL/L (ref 3.5–5.1)
PRESSURE SUPPORT SETTING VENT: 20
RBC # BLD AUTO: 4.42 M/UL (ref 4.05–5.2)
RESPIRATORY RATE: 24
SAO2 % BLD: 95 %
SERVICE CMNT-IMP: ABNORMAL
SERVICE CMNT-IMP: ABNORMAL
SODIUM BLD-SCNC: 142 MMOL/L (ref 136–145)
SODIUM SERPL-SCNC: 139 MMOL/L (ref 136–146)
SPECIMEN SITE: ABNORMAL
WBC # BLD AUTO: 13 K/UL (ref 4.3–11.1)

## 2023-12-05 PROCEDURE — 84295 ASSAY OF SERUM SODIUM: CPT

## 2023-12-05 PROCEDURE — 80048 BASIC METABOLIC PNL TOTAL CA: CPT

## 2023-12-05 PROCEDURE — 6370000000 HC RX 637 (ALT 250 FOR IP): Performed by: NURSE PRACTITIONER

## 2023-12-05 PROCEDURE — 6370000000 HC RX 637 (ALT 250 FOR IP): Performed by: INTERNAL MEDICINE

## 2023-12-05 PROCEDURE — 83735 ASSAY OF MAGNESIUM: CPT

## 2023-12-05 PROCEDURE — 84132 ASSAY OF SERUM POTASSIUM: CPT

## 2023-12-05 PROCEDURE — 1100000000 HC RM PRIVATE

## 2023-12-05 PROCEDURE — 2580000003 HC RX 258: Performed by: INTERNAL MEDICINE

## 2023-12-05 PROCEDURE — 94761 N-INVAS EAR/PLS OXIMETRY MLT: CPT

## 2023-12-05 PROCEDURE — 82330 ASSAY OF CALCIUM: CPT

## 2023-12-05 PROCEDURE — 85025 COMPLETE CBC W/AUTO DIFF WBC: CPT

## 2023-12-05 PROCEDURE — 82803 BLOOD GASES ANY COMBINATION: CPT

## 2023-12-05 PROCEDURE — 2700000000 HC OXYGEN THERAPY PER DAY

## 2023-12-05 PROCEDURE — 6360000002 HC RX W HCPCS: Performed by: INTERNAL MEDICINE

## 2023-12-05 PROCEDURE — 6370000000 HC RX 637 (ALT 250 FOR IP): Performed by: EMERGENCY MEDICINE

## 2023-12-05 PROCEDURE — 94660 CPAP INITIATION&MGMT: CPT

## 2023-12-05 PROCEDURE — 82947 ASSAY GLUCOSE BLOOD QUANT: CPT

## 2023-12-05 PROCEDURE — 99291 CRITICAL CARE FIRST HOUR: CPT | Performed by: INTERNAL MEDICINE

## 2023-12-05 PROCEDURE — 36415 COLL VENOUS BLD VENIPUNCTURE: CPT

## 2023-12-05 RX ORDER — POTASSIUM CHLORIDE 20 MEQ/1
40 TABLET, EXTENDED RELEASE ORAL 2 TIMES DAILY
Status: DISCONTINUED | OUTPATIENT
Start: 2023-12-05 | End: 2023-12-05

## 2023-12-05 RX ORDER — LANOLIN ALCOHOL/MO/W.PET/CERES
3 CREAM (GRAM) TOPICAL NIGHTLY PRN
Status: DISCONTINUED | OUTPATIENT
Start: 2023-12-05 | End: 2023-12-08 | Stop reason: HOSPADM

## 2023-12-05 RX ORDER — POTASSIUM CHLORIDE 7.45 MG/ML
10 INJECTION INTRAVENOUS 2 TIMES DAILY
Status: DISCONTINUED | OUTPATIENT
Start: 2023-12-05 | End: 2023-12-05

## 2023-12-05 RX ADMIN — SODIUM CHLORIDE, PRESERVATIVE FREE 10 ML: 5 INJECTION INTRAVENOUS at 20:18

## 2023-12-05 RX ADMIN — FUROSEMIDE 40 MG: 10 INJECTION, SOLUTION INTRAMUSCULAR; INTRAVENOUS at 09:08

## 2023-12-05 RX ADMIN — Medication 3 MG: at 23:43

## 2023-12-05 RX ADMIN — ENOXAPARIN SODIUM 30 MG: 100 INJECTION SUBCUTANEOUS at 20:18

## 2023-12-05 RX ADMIN — ACETAMINOPHEN 650 MG: 325 TABLET ORAL at 16:06

## 2023-12-05 RX ADMIN — POTASSIUM BICARBONATE 40 MEQ: 782 TABLET, EFFERVESCENT ORAL at 06:37

## 2023-12-05 RX ADMIN — SODIUM CHLORIDE, PRESERVATIVE FREE 10 ML: 5 INJECTION INTRAVENOUS at 09:08

## 2023-12-05 RX ADMIN — ENOXAPARIN SODIUM 30 MG: 100 INJECTION SUBCUTANEOUS at 09:07

## 2023-12-05 RX ADMIN — POTASSIUM BICARBONATE 40 MEQ: 782 TABLET, EFFERVESCENT ORAL at 20:18

## 2023-12-05 ASSESSMENT — PAIN SCALES - GENERAL
PAINLEVEL_OUTOF10: 4
PAINLEVEL_OUTOF10: 0

## 2023-12-05 ASSESSMENT — PAIN DESCRIPTION - LOCATION: LOCATION: NECK;HEAD

## 2023-12-05 NOTE — PROGRESS NOTES
TRANSFER - OUT REPORT:    Verbal report given to Kaila Shepherd on Homer Onofre  being transferred to [phone] for routine progression of patient care       Report consisted of patient's Situation, Background, Assessment and   Recommendations(SBAR). Information from the following report(s) Nurse Handoff Report, Adult Overview, MAR, and Cardiac Rhythm NSR  was reviewed with the receiving nurse. Lines:   Peripheral IV 12/02/23 Posterior;Right Hand (Active)   Site Assessment Clean, dry & intact 12/05/23 1200   Line Status Flushed;Blood return noted;Capped 12/05/23 1200   Line Care Connections checked and tightened 12/05/23 1200   Phlebitis Assessment No symptoms 12/05/23 1200   Infiltration Assessment 0 12/05/23 1200   Alcohol Cap Used Yes 12/05/23 1200   Dressing Status Clean, dry & intact 12/05/23 1200   Dressing Type Transparent 12/05/23 1200        Opportunity for questions and clarification was provided.       Patient transported with:  O2 @ 6lpm

## 2023-12-05 NOTE — INTERDISCIPLINARY ROUNDS
Multi-D Rounds/Checklist (leapfrog):  Lines: can any be removed?: None          DVT Prophylaxis: Ordered  Vent: N/A  Nutrition Ordered/appropriate: Ordered  Can antibiotics or other drugs be stopped? N/A Yes/No  Inpat Anti-Infectives (From admission, onward)      None          Consults needed: hospitalist  A: Is pain control adequate? (has PRNs? Stop drip?) N/A  B: Sedation break and SBT? N/A  C: Is sedation choice appropriate? N/A  D: Delirium/CAM-ICU? No  E: Mobility goals/appropriateness? Yes  F: Family update and plan? Son is surrogate decision maker and is being updated daily by primary attending and nursing staff.     Joaquin Rebollar, APRN - CNP

## 2023-12-05 NOTE — PROGRESS NOTES
12/05/23 0107   NIV Type   $NIV $Daily Charge   NIV Started/Stopped On   Equipment Type v60   Mode Bilevel   Mask Type Under the nose   Mask Size Medium   Assessment   Respirations 28   Comfort Level Good   Using Accessory Muscles No   Mask Compliance Good   Settings/Measurements   PIP Observed 20 cm H20   IPAP 20 cmH20   CPAP/EPAP 8 cmH2O   Vt (Measured) 462 mL   Rate Ordered 24   Insp Rise Time (%) 3 %   FiO2  45 %   I Time/ I Time % 0.9 s   Minute Volume (L/min) 13 Liters   Mask Leak (lpm) 0 lpm   Patient's Home Machine No   Alarm Settings   Alarms On Y   Low Pressure (cmH2O) 5 cmH2O   High Pressure (cmH2O) 35 cmH2O   Apnea (secs) 20 secs   RR Low (bpm) 8   RR High (bpm) 50 br/min

## 2023-12-05 NOTE — PROGRESS NOTES
A follow up visit was made to the patient. Emotional support, spiritual presence and   prayer were provided for the patient.       Bridget Hamman, [address], MRE

## 2023-12-05 NOTE — PROGRESS NOTES
TRANSFER - IN REPORT:    Verbal report received from [facility] on Cleophus Rolling  being received from CCU for routine progression of patient care      Report consisted of patient's Situation, Background, Assessment and   Recommendations(SBAR). Information from the following report(s) Nurse Handoff Report was reviewed with the receiving nurse. Opportunity for questions and clarification was provided. Assessment completed upon patient's arrival to unit and care assumed.

## 2023-12-06 ENCOUNTER — APPOINTMENT (OUTPATIENT)
Dept: GENERAL RADIOLOGY | Age: 63
End: 2023-12-06
Payer: COMMERCIAL

## 2023-12-06 LAB
ANION GAP SERPL CALC-SCNC: 3 MMOL/L (ref 2–11)
BASOPHILS # BLD: 0 K/UL (ref 0–0.2)
BASOPHILS NFR BLD: 0 % (ref 0–2)
BUN SERPL-MCNC: 19 MG/DL (ref 8–23)
CALCIUM SERPL-MCNC: 8.8 MG/DL (ref 8.3–10.4)
CHLORIDE SERPL-SCNC: 96 MMOL/L (ref 103–113)
CO2 SERPL-SCNC: 39 MMOL/L (ref 21–32)
CREAT SERPL-MCNC: 0.8 MG/DL (ref 0.6–1)
DIFFERENTIAL METHOD BLD: ABNORMAL
EOSINOPHIL # BLD: 0.7 K/UL (ref 0–0.8)
EOSINOPHIL NFR BLD: 6 % (ref 0.5–7.8)
ERYTHROCYTE [DISTWIDTH] IN BLOOD BY AUTOMATED COUNT: 16 % (ref 11.9–14.6)
GLUCOSE SERPL-MCNC: 86 MG/DL (ref 65–100)
HCT VFR BLD AUTO: 41.6 % (ref 35.8–46.3)
HGB BLD-MCNC: 12.3 G/DL (ref 11.7–15.4)
IMM GRANULOCYTES # BLD AUTO: 0 K/UL (ref 0–0.5)
IMM GRANULOCYTES NFR BLD AUTO: 0 % (ref 0–5)
LYMPHOCYTES # BLD: 1.9 K/UL (ref 0.5–4.6)
LYMPHOCYTES NFR BLD: 18 % (ref 13–44)
MAGNESIUM SERPL-MCNC: 2.4 MG/DL (ref 1.8–2.4)
MCH RBC QN AUTO: 27.7 PG (ref 26.1–32.9)
MCHC RBC AUTO-ENTMCNC: 29.6 G/DL (ref 31.4–35)
MCV RBC AUTO: 93.7 FL (ref 82–102)
MONOCYTES # BLD: 0.7 K/UL (ref 0.1–1.3)
MONOCYTES NFR BLD: 7 % (ref 4–12)
NEUTS SEG # BLD: 7.7 K/UL (ref 1.7–8.2)
NEUTS SEG NFR BLD: 69 % (ref 43–78)
NRBC # BLD: 0 K/UL (ref 0–0.2)
PLATELET # BLD AUTO: 231 K/UL (ref 150–450)
PMV BLD AUTO: 12 FL (ref 9.4–12.3)
POTASSIUM SERPL-SCNC: 3.4 MMOL/L (ref 3.5–5.1)
RBC # BLD AUTO: 4.44 M/UL (ref 4.05–5.2)
SODIUM SERPL-SCNC: 138 MMOL/L (ref 136–146)
WBC # BLD AUTO: 11.1 K/UL (ref 4.3–11.1)

## 2023-12-06 PROCEDURE — 36415 COLL VENOUS BLD VENIPUNCTURE: CPT

## 2023-12-06 PROCEDURE — 6370000000 HC RX 637 (ALT 250 FOR IP): Performed by: INTERNAL MEDICINE

## 2023-12-06 PROCEDURE — 94760 N-INVAS EAR/PLS OXIMETRY 1: CPT

## 2023-12-06 PROCEDURE — 85025 COMPLETE CBC W/AUTO DIFF WBC: CPT

## 2023-12-06 PROCEDURE — 99232 SBSQ HOSP IP/OBS MODERATE 35: CPT | Performed by: INTERNAL MEDICINE

## 2023-12-06 PROCEDURE — 2700000000 HC OXYGEN THERAPY PER DAY

## 2023-12-06 PROCEDURE — 6360000002 HC RX W HCPCS: Performed by: INTERNAL MEDICINE

## 2023-12-06 PROCEDURE — 6370000000 HC RX 637 (ALT 250 FOR IP): Performed by: NURSE PRACTITIONER

## 2023-12-06 PROCEDURE — 83735 ASSAY OF MAGNESIUM: CPT

## 2023-12-06 PROCEDURE — 71045 X-RAY EXAM CHEST 1 VIEW: CPT

## 2023-12-06 PROCEDURE — 97530 THERAPEUTIC ACTIVITIES: CPT

## 2023-12-06 PROCEDURE — 2580000003 HC RX 258: Performed by: INTERNAL MEDICINE

## 2023-12-06 PROCEDURE — 97535 SELF CARE MNGMENT TRAINING: CPT

## 2023-12-06 PROCEDURE — 80048 BASIC METABOLIC PNL TOTAL CA: CPT

## 2023-12-06 PROCEDURE — 1100000000 HC RM PRIVATE

## 2023-12-06 PROCEDURE — 94660 CPAP INITIATION&MGMT: CPT

## 2023-12-06 RX ADMIN — ENOXAPARIN SODIUM 30 MG: 100 INJECTION SUBCUTANEOUS at 08:48

## 2023-12-06 RX ADMIN — POTASSIUM BICARBONATE 40 MEQ: 782 TABLET, EFFERVESCENT ORAL at 21:29

## 2023-12-06 RX ADMIN — POTASSIUM BICARBONATE 40 MEQ: 782 TABLET, EFFERVESCENT ORAL at 08:48

## 2023-12-06 RX ADMIN — SODIUM CHLORIDE, PRESERVATIVE FREE 10 ML: 5 INJECTION INTRAVENOUS at 21:30

## 2023-12-06 RX ADMIN — Medication 3 MG: at 22:49

## 2023-12-06 RX ADMIN — FUROSEMIDE 40 MG: 10 INJECTION, SOLUTION INTRAMUSCULAR; INTRAVENOUS at 08:47

## 2023-12-06 RX ADMIN — SODIUM CHLORIDE, PRESERVATIVE FREE 10 ML: 5 INJECTION INTRAVENOUS at 08:48

## 2023-12-06 RX ADMIN — ENOXAPARIN SODIUM 30 MG: 100 INJECTION SUBCUTANEOUS at 21:29

## 2023-12-06 ASSESSMENT — PAIN SCALES - GENERAL: PAINLEVEL_OUTOF10: 0

## 2023-12-06 NOTE — PROGRESS NOTES
ACUTE PHYSICAL THERAPY GOALS:   (Developed with and agreed upon by patient and/or caregiver. )  LTG:  (1.)Ms. Bianca Fajardo will move from supine to sit and sit to supine , scoot up and down, and roll side to side in bed with INDEPENDENCE within 7 treatment day(s). (2.)Ms. Bianca Fajardo will transfer from bed to chair and chair to bed with MODIFIED INDEPENDENCE using the least restrictive device within 7 treatment day(s). (3.)Ms. Bianca Fajardo will ambulate with MODIFIED INDEPENDENCE for 100 feet with the least restrictive device within 7 treatment day(s). (4.)Ms. Bianca Fajardo will participate in therapeutic activity/exercises x 25 minutes for increased activity tolerance with SpO2 above 90% within 7 treatment days. (5.)Ms. Bianca Fajardo will propel manual wheelchair for 200 ft with supervision for improved independence within 7 treatment days. PHYSICAL THERAPY: Daily Note AM   (Link to Caseload Tracking: PT Visit Days : 2  Time In/Out PT Charge Capture  Rehab Caseload Tracker  Orders    Reno Metcalf is a 66 y.o. female   PRIMARY DIAGNOSIS: Acute respiratory failure with hypoxia and hypercapnia (HCC)  Acute pulmonary edema (HCC) [J81.0]  Hypoxia [R09.02]  Acute respiratory failure with hypoxia and hypercapnia (HCC) [J96.01, J96.02]  Acute congestive heart failure, unspecified heart failure type ([address]) [I50.9]       Inpatient: Payor: Fabian Metzger / Plan: Fabian Metzger / Product Type: *No Product type* /     ASSESSMENT:     REHAB RECOMMENDATIONS:   Recommendation to date pending progress:  Setting:  Home Health Therapy    Equipment:    To Be Determined     ASSESSMENT:  Ms. Bianca Fajardo was supine upon contact and agreeable to PT. Patient presents deconditioned and fatigues quickly admitting to living a pretty sedentary life at home. Today, patient performed supine to sit with supervision [facility] with SBA and no assistive device.  Patient took a seated rest break then ambulated 25' x 2 with rolling walker, [] []      GAIT: I Mod I S SBA CGA Min Mod Max Total  NT x2 Comments:   Level of Assistance [] [] [] [x] [x] [] [] [] [] [] []    Distance  25 feet x 2    DME Rolling Walker    Gait Quality Decreased harper , Decreased step clearance, and Decreased step length    Weightbearing Status      Stairs      I=Independent, Mod I=Modified Independent, S=Supervision, SBA=Standby Assistance, CGA=Contact Guard Assistance,   Min=Minimal Assistance, Mod=Moderate Assistance, Max=Maximal Assistance, Total=Total Assistance, NT=Not Tested    PLAN:   FREQUENCY AND DURATION: 3 times/week for duration of hospital stay or until stated goals are met, whichever comes first.    TREATMENT:   TREATMENT:   Therapeutic Activity (24 Minutes): Therapeutic activity included Supine to Sit, Scooting, Transfer Training, Ambulation on level ground, Sitting balance , Standing balance, and review of pursed lipped breathing, activity pacing, and energy conservation techniques to improve functional Activity tolerance, Balance, Mobility, and Strength. TREATMENT GRID:  N/A    AFTER TREATMENT PRECAUTIONS: Bed/Chair Locked, Call light within reach, Chair, Needs within reach, RN notified, and Visitors at bedside    INTERDISCIPLINARY COLLABORATION:  RN/ PCT and PT/ PTA    EDUCATION:      TIME IN/OUT:  Time In: 0911  Time Out: 0935  Minutes: [address]  JHNOY Arroyo

## 2023-12-06 NOTE — DISCHARGE INSTRUCTIONS
You were admitted to [facility] on 12/2 with difficulty breathing and found to have low oxygen, high carbon dioxide, and high blood pressure. You were treated with bipap (the face mask) and IV blood pressure medicine in the ICU and transferred to the regular floor. You were started on a water pill called lasix which helped with swelling. Your new medicines at discharge are: amitryptiline (for sleep), metoprolol (for heart rate and bp), and furosemide (lasix, water pill). Your hydrochlorothiazide has been stopped and lisinopril will continue. Please follow up with pulmonology and primary care after discharge. Get well soon! Dr. Alejo [facility]  [address] [facility]   [address]   [phone]    The pulmonary office will call you in 1-2 business days to arrange follow up in the pulmonary office. If you have not heard from the office after 2 full business days, please call the office to arrange follow up.

## 2023-12-06 NOTE — PROGRESS NOTES
ACUTE OCCUPATIONAL THERAPY GOALS:   (Developed with and agreed upon by patient and/or caregiver.)  1. Patient will complete lower body bathing and dressing with MOD I and adaptive equipment as needed. 2. Patient will complete toileting with MOD I.   3. Patient will tolerate 30 minutes of OT treatment with 1-2 rest breaks to increase activity tolerance for ADLs. 4. Patient will complete functional transfers with MOD I and adaptive equipment as needed. 5. Patient will complete functional mobility for household distances with MOD I and adaptive equipment as needed. 6. Patient will complete self-grooming while standing edge of sink with MOD I and adaptive equipment as needed. OCCUPATIONAL THERAPY: Daily Note PM   OT Visit Days: 2   Time In/Out  OT Charge Capture  Rehab Caseload Tracker  OT Orders    Flakita Bowman is a 66 y.o. female   PRIMARY DIAGNOSIS: Acute respiratory failure with hypoxia and hypercapnia (HCC)  Acute pulmonary edema (HCC) [J81.0]  Hypoxia [R09.02]  Acute respiratory failure with hypoxia and hypercapnia (HCC) [J96.01, J96.02]  Acute congestive heart failure, unspecified heart failure type ([address]) [I50.9]       Inpatient: Payor: Aggie Solares / Plan: Aggie Solares / Product Type: *No Product type* /     ASSESSMENT:     REHAB RECOMMENDATIONS: CURRENT LEVEL OF FUNCTION:  (Most Recently Demonstrated)   Recommendation to date pending progress:  Setting:  No further skilled occupational therapy after discharge from hospital    Equipment:    None Bathing:  Not Tested  Dressing:  Not Tested  Feeding/Grooming:  Stand by Assist  Toileting:  Stand by Assist  Functional Mobility:  Contact Guard Assist     ASSESSMENT:  Ms. Po Gastelum presents with decreased activity tolerance especially in standing and generalized weakness. Pt did well with self care assessed above. Pt is probably close to her baseline from an OT stand point.  No further OT needs after d/c from the hospital.       SUBJECTIVE:     Ms. Po Gastelum

## 2023-12-06 NOTE — PROGRESS NOTES
Off Bipap up to bedside commode. Able to wear Bipap for 3 hours tonight. Doesn't want to put back on at this time. Pt placed on 5L HF at present.

## 2023-12-06 NOTE — PROGRESS NOTES
Patient placed on V60. Connected to the Nurse Call System and Continuous Pulse Oximetry utilizing Vital Sync. Alarms are activated and nurse has been notified. Documentation completed.         12/06/23 0145   NIV Type   $NIV $Daily Charge   NIV Started/Stopped On   Equipment Type v60   Mode Bilevel   Mask Type Nasal mask   Mask Size Medium   Assessment   Pulse 67   Respirations 25   SpO2 94 %   Comfort Level Good   Using Accessory Muscles No   Mask Compliance Good   Settings/Measurements   PIP Observed 17 cm H20   IPAP 16 cmH20   CPAP/EPAP 10 cmH2O   Vt (Measured) 468 mL   Rate Ordered 24   Insp Rise Time (%) 3 %   FiO2  45 %   I Time/ I Time % 0.9 s   Minute Volume (L/min) 11.8 Liters   Mask Leak (lpm) 0 lpm   Patient's Home Machine No   Alarm Settings   Alarms On Y   Low Pressure (cmH2O) 5 cmH2O   High Pressure (cmH2O) 35 cmH2O   Delay Alarm 20 sec(s)   Apnea (secs) 20 secs   RR Low (bpm) 8   RR High (bpm) 50 br/min

## 2023-12-06 NOTE — CARE COORDINATION
MSN, CM:  patient continues on 5L NC this AM.  PT is recommending HH when medically stable for discharge. Case Management will continue to follow.

## 2023-12-06 NOTE — PROGRESS NOTES
hypercapnea with compensated pH. No known pulmonary history. More suspicion at this point for OHS. --continue to wear bipap with sleep and during naps patient. FOR outpatient sleep study, so we will set up for split-night in the sleep lab. Will also schedule for follow-up with our sleep physicians. Active Problems: Morbidly obese (HCC)      Acute pulmonary edema ([address])  Plan: pending CXR today. On daily IV lasix. Leukocytosis  Plan: slowly improving; no ABx currently. Acute congestive heart failure ([address])    Hypertensive urgency  Plan: all meds on hold due to lower BP's. --echo with L ventricular wall thickness, but otherwise not much viewed to be helpful.    -- Would consider continuing IV diuresis for now, but do think she is closer to discharge at this point. Message sent to the office to follow-up with sleep as well as a split-night sleep study needed. More than 50% of the time documented was spent in face-to-face contact with the patient and in the care of the patient on the floor/unit where the patient is located. In this split/shared evaluation I performed performed a medically appropriate history and exam, counseled and educated the patient and/or family member, ordered medications, tests or procedures, documented information in EMR, and coordinated care. which accounted for 12 minutes of clinical time. Caleb Duane, APRN - CNP   In this split/shared evaluation I performed reviewed the patients's H&P, available images, labs, cultures. , discussed case in detail with NPP, performed a medically appropriate history and exam, counseled and educated the patient and/or family member, ordered and/or reviewed medications, tests or procedures, documented information in EMR, independently interpreted images, and coordinated care. which accounted for 14 minutes clinical time.      Impression:   66 y.o. female presents with a history of obesity, significant HTN, osteomyelitis of spine in 2020 with MSSA bacteremia admitted 12/2  with hypertensive urgency and some volume overload. She had been on bipap initially and has had diuresis. Transferred to floor on 12/5.   Now down to 3 L O2- hopefully home in a couple of days  Cxr 12/6 today - improved aeration      Meghna Nelson MD

## 2023-12-07 LAB
ANION GAP SERPL CALC-SCNC: 4 MMOL/L (ref 2–11)
BACTERIA SPEC CULT: NORMAL
BACTERIA SPEC CULT: NORMAL
BASOPHILS # BLD: 0 K/UL (ref 0–0.2)
BASOPHILS NFR BLD: 0 % (ref 0–2)
BUN SERPL-MCNC: 15 MG/DL (ref 8–23)
CALCIUM SERPL-MCNC: 8.9 MG/DL (ref 8.3–10.4)
CHLORIDE SERPL-SCNC: 97 MMOL/L (ref 103–113)
CO2 SERPL-SCNC: 37 MMOL/L (ref 21–32)
CREAT SERPL-MCNC: 0.6 MG/DL (ref 0.6–1)
DIFFERENTIAL METHOD BLD: ABNORMAL
EOSINOPHIL # BLD: 0.6 K/UL (ref 0–0.8)
EOSINOPHIL NFR BLD: 5 % (ref 0.5–7.8)
ERYTHROCYTE [DISTWIDTH] IN BLOOD BY AUTOMATED COUNT: 16.1 % (ref 11.9–14.6)
GLUCOSE SERPL-MCNC: 94 MG/DL (ref 65–100)
HCT VFR BLD AUTO: 43.3 % (ref 35.8–46.3)
HGB BLD-MCNC: 12.9 G/DL (ref 11.7–15.4)
IMM GRANULOCYTES # BLD AUTO: 0.1 K/UL (ref 0–0.5)
IMM GRANULOCYTES NFR BLD AUTO: 0 % (ref 0–5)
LYMPHOCYTES # BLD: 1.7 K/UL (ref 0.5–4.6)
LYMPHOCYTES NFR BLD: 14 % (ref 13–44)
MAGNESIUM SERPL-MCNC: 2.7 MG/DL (ref 1.8–2.4)
MCH RBC QN AUTO: 27.9 PG (ref 26.1–32.9)
MCHC RBC AUTO-ENTMCNC: 29.8 G/DL (ref 31.4–35)
MCV RBC AUTO: 93.7 FL (ref 82–102)
MONOCYTES # BLD: 0.9 K/UL (ref 0.1–1.3)
MONOCYTES NFR BLD: 7 % (ref 4–12)
NEUTS SEG # BLD: 9 K/UL (ref 1.7–8.2)
NEUTS SEG NFR BLD: 74 % (ref 43–78)
NRBC # BLD: 0 K/UL (ref 0–0.2)
PLATELET # BLD AUTO: 236 K/UL (ref 150–450)
PMV BLD AUTO: 11.8 FL (ref 9.4–12.3)
POTASSIUM SERPL-SCNC: 3.3 MMOL/L (ref 3.5–5.1)
RBC # BLD AUTO: 4.62 M/UL (ref 4.05–5.2)
SERVICE CMNT-IMP: NORMAL
SERVICE CMNT-IMP: NORMAL
SODIUM SERPL-SCNC: 138 MMOL/L (ref 136–146)
WBC # BLD AUTO: 12.2 K/UL (ref 4.3–11.1)

## 2023-12-07 PROCEDURE — 6370000000 HC RX 637 (ALT 250 FOR IP): Performed by: INTERNAL MEDICINE

## 2023-12-07 PROCEDURE — 85025 COMPLETE CBC W/AUTO DIFF WBC: CPT

## 2023-12-07 PROCEDURE — 2580000003 HC RX 258: Performed by: INTERNAL MEDICINE

## 2023-12-07 PROCEDURE — 99232 SBSQ HOSP IP/OBS MODERATE 35: CPT | Performed by: INTERNAL MEDICINE

## 2023-12-07 PROCEDURE — 1100000000 HC RM PRIVATE

## 2023-12-07 PROCEDURE — 80048 BASIC METABOLIC PNL TOTAL CA: CPT

## 2023-12-07 PROCEDURE — 6370000000 HC RX 637 (ALT 250 FOR IP): Performed by: NURSE PRACTITIONER

## 2023-12-07 PROCEDURE — 94760 N-INVAS EAR/PLS OXIMETRY 1: CPT

## 2023-12-07 PROCEDURE — 36415 COLL VENOUS BLD VENIPUNCTURE: CPT

## 2023-12-07 PROCEDURE — 6360000002 HC RX W HCPCS: Performed by: INTERNAL MEDICINE

## 2023-12-07 PROCEDURE — 83735 ASSAY OF MAGNESIUM: CPT

## 2023-12-07 RX ORDER — AMITRIPTYLINE HYDROCHLORIDE 50 MG/1
25 TABLET, FILM COATED ORAL NIGHTLY
Status: DISCONTINUED | OUTPATIENT
Start: 2023-12-07 | End: 2023-12-08 | Stop reason: HOSPADM

## 2023-12-07 RX ORDER — FUROSEMIDE 40 MG/1
40 TABLET ORAL DAILY
Status: DISCONTINUED | OUTPATIENT
Start: 2023-12-08 | End: 2023-12-08 | Stop reason: HOSPADM

## 2023-12-07 RX ADMIN — AMITRIPTYLINE HYDROCHLORIDE 25 MG: 50 TABLET, FILM COATED ORAL at 20:42

## 2023-12-07 RX ADMIN — POTASSIUM BICARBONATE 40 MEQ: 782 TABLET, EFFERVESCENT ORAL at 08:43

## 2023-12-07 RX ADMIN — METOPROLOL TARTRATE 25 MG: 25 TABLET, FILM COATED ORAL at 20:42

## 2023-12-07 RX ADMIN — ACETAMINOPHEN 650 MG: 325 TABLET ORAL at 19:25

## 2023-12-07 RX ADMIN — SODIUM CHLORIDE, PRESERVATIVE FREE 10 ML: 5 INJECTION INTRAVENOUS at 20:43

## 2023-12-07 RX ADMIN — POTASSIUM BICARBONATE 40 MEQ: 782 TABLET, EFFERVESCENT ORAL at 20:43

## 2023-12-07 RX ADMIN — FUROSEMIDE 40 MG: 10 INJECTION, SOLUTION INTRAMUSCULAR; INTRAVENOUS at 08:43

## 2023-12-07 RX ADMIN — ENOXAPARIN SODIUM 30 MG: 100 INJECTION SUBCUTANEOUS at 08:43

## 2023-12-07 RX ADMIN — ENOXAPARIN SODIUM 30 MG: 100 INJECTION SUBCUTANEOUS at 21:13

## 2023-12-07 RX ADMIN — METOPROLOL TARTRATE 25 MG: 25 TABLET, FILM COATED ORAL at 08:43

## 2023-12-07 RX ADMIN — SODIUM CHLORIDE, PRESERVATIVE FREE 10 ML: 5 INJECTION INTRAVENOUS at 08:44

## 2023-12-07 ASSESSMENT — PAIN DESCRIPTION - LOCATION: LOCATION: HEAD

## 2023-12-07 ASSESSMENT — PAIN SCALES - GENERAL: PAINLEVEL_OUTOF10: 5

## 2023-12-07 ASSESSMENT — PAIN DESCRIPTION - DESCRIPTORS: DESCRIPTORS: DULL

## 2023-12-07 NOTE — PROGRESS NOTES
PT Daily Note:  Attempted to see patient for physical therapy this morning but patient requested to defer PT until after she ate her breakfast. Will check back on patient at a later date/time if schedule permits. Thank you,  Latrell Daniels.  JHONY Arroyo

## 2023-12-07 NOTE — PROGRESS NOTES
bipap with sleep and during naps patient. FOR outpatient sleep study, so we will set up for split-night in the sleep lab. Will also schedule for follow-up with our sleep physicians.  --will start amitriptyline at night to see if this helps. Active Problems: Morbidly obese (HCC)      Acute pulmonary edema ([address])  Plan: pending CXR today. On daily IV lasix. Leukocytosis  Plan: slowly improving; no ABx currently. Acute congestive heart failure (HCC)    Hypertensive urgency  Plan:   --echo with L ventricular wall thickness, but otherwise not much viewed to be helpful. -- continue IV diuresis, restart BB for now, but do think she is closer to discharge at this point    Message sent to the office to follow-up with sleep as well as a split-night sleep study needed. More than 50% of the time documented was spent in face-to-face contact with the patient and in the care of the patient on the floor/unit where the patient is located. In this split/shared evaluation I performed performed a medically appropriate history and exam, counseled and educated the patient and/or family member, ordered medications, tests or procedures, documented information in EMR, and coordinated care. which accounted for 12 minutes of clinical time. MARIA L Frausto CNP   In this split/shared evaluation I performed reviewed the patients's H&P, available images, labs, cultures. , discussed case in detail with NPP, performed a medically appropriate history and exam, counseled and educated the patient and/or family member, ordered and/or reviewed medications, tests or procedures, documented information in EMR, independently interpreted images, and coordinated care. which accounted for 14 minutes clinical time. Impression:   66 y.o. female presents with a history of obesity, significant HTN, osteomyelitis of spine in 2020 with MSSA bacteremia admitted 12/2  with hypertensive urgency and some volume overload. She had been on bipap initially and has had diuresis. Transferred to floor on 12/5.   Now down to 2 L O2- hopefully home in a couple of days  will get nichole on O2 tonight -leave off of bipap to see how this is tolerated as she does not want to go home with bipap but wants sleep study      Steve Alcocer MD

## 2023-12-07 NOTE — PROGRESS NOTES
Pt removed NIV @0230. Has been on NC 2lpm since. Did not meet criteria for ABG. Cancelled by supervisor.

## 2023-12-07 NOTE — PROGRESS NOTES
Oxygen Qualifier       Room air: SpO2 with O2 and liter flow   Resting SpO2  90%     Ambulating SpO2  85% 86% on 1L  87% on 2L  88% on 3L         Completed by:    Lisa Youngblood RCP

## 2023-12-08 VITALS
TEMPERATURE: 97.7 F | HEART RATE: 73 BPM | RESPIRATION RATE: 20 BRPM | WEIGHT: 282.85 LBS | DIASTOLIC BLOOD PRESSURE: 87 MMHG | HEIGHT: 64 IN | OXYGEN SATURATION: 95 % | BODY MASS INDEX: 48.29 KG/M2 | SYSTOLIC BLOOD PRESSURE: 141 MMHG

## 2023-12-08 PROBLEM — R11.2 NAUSEA AND VOMITING: Status: RESOLVED | Noted: 2020-07-22 | Resolved: 2023-12-08

## 2023-12-08 PROBLEM — E66.01 MORBIDLY OBESE (HCC): Chronic | Status: ACTIVE | Noted: 2020-07-20

## 2023-12-08 PROBLEM — E87.6 HYPOKALEMIA: Status: RESOLVED | Noted: 2020-07-20 | Resolved: 2023-12-08

## 2023-12-08 PROBLEM — I10 HTN (HYPERTENSION), MALIGNANT: Chronic | Status: ACTIVE | Noted: 2020-07-20

## 2023-12-08 PROBLEM — R29.818 SUSPECTED SLEEP APNEA: Status: ACTIVE | Noted: 2023-12-08

## 2023-12-08 PROBLEM — M19.90 ARTHRITIS: Chronic | Status: ACTIVE | Noted: 2020-07-20

## 2023-12-08 LAB
ANION GAP SERPL CALC-SCNC: 3 MMOL/L (ref 2–11)
BASOPHILS # BLD: 0.1 K/UL (ref 0–0.2)
BASOPHILS NFR BLD: 1 % (ref 0–2)
BUN SERPL-MCNC: 18 MG/DL (ref 8–23)
CALCIUM SERPL-MCNC: 9.2 MG/DL (ref 8.3–10.4)
CHLORIDE SERPL-SCNC: 100 MMOL/L (ref 103–113)
CO2 SERPL-SCNC: 38 MMOL/L (ref 21–32)
CREAT SERPL-MCNC: 0.7 MG/DL (ref 0.6–1)
DIFFERENTIAL METHOD BLD: ABNORMAL
EOSINOPHIL # BLD: 0.5 K/UL (ref 0–0.8)
EOSINOPHIL NFR BLD: 5 % (ref 0.5–7.8)
ERYTHROCYTE [DISTWIDTH] IN BLOOD BY AUTOMATED COUNT: 16 % (ref 11.9–14.6)
GLUCOSE SERPL-MCNC: 97 MG/DL (ref 65–100)
HCT VFR BLD AUTO: 43.6 % (ref 35.8–46.3)
HGB BLD-MCNC: 13.1 G/DL (ref 11.7–15.4)
IMM GRANULOCYTES # BLD AUTO: 0.1 K/UL (ref 0–0.5)
IMM GRANULOCYTES NFR BLD AUTO: 1 % (ref 0–5)
LYMPHOCYTES # BLD: 1.8 K/UL (ref 0.5–4.6)
LYMPHOCYTES NFR BLD: 17 % (ref 13–44)
MAGNESIUM SERPL-MCNC: 2.4 MG/DL (ref 1.8–2.4)
MCH RBC QN AUTO: 27.9 PG (ref 26.1–32.9)
MCHC RBC AUTO-ENTMCNC: 30 G/DL (ref 31.4–35)
MCV RBC AUTO: 93 FL (ref 82–102)
MONOCYTES # BLD: 0.7 K/UL (ref 0.1–1.3)
MONOCYTES NFR BLD: 7 % (ref 4–12)
NEUTS SEG # BLD: 7.7 K/UL (ref 1.7–8.2)
NEUTS SEG NFR BLD: 71 % (ref 43–78)
NRBC # BLD: 0 K/UL (ref 0–0.2)
PLATELET # BLD AUTO: 270 K/UL (ref 150–450)
PMV BLD AUTO: 12 FL (ref 9.4–12.3)
POTASSIUM SERPL-SCNC: 3.5 MMOL/L (ref 3.5–5.1)
RBC # BLD AUTO: 4.69 M/UL (ref 4.05–5.2)
SODIUM SERPL-SCNC: 141 MMOL/L (ref 136–146)
WBC # BLD AUTO: 10.9 K/UL (ref 4.3–11.1)

## 2023-12-08 PROCEDURE — 99233 SBSQ HOSP IP/OBS HIGH 50: CPT | Performed by: INTERNAL MEDICINE

## 2023-12-08 PROCEDURE — 80048 BASIC METABOLIC PNL TOTAL CA: CPT

## 2023-12-08 PROCEDURE — 6370000000 HC RX 637 (ALT 250 FOR IP): Performed by: INTERNAL MEDICINE

## 2023-12-08 PROCEDURE — 94760 N-INVAS EAR/PLS OXIMETRY 1: CPT

## 2023-12-08 PROCEDURE — 2580000003 HC RX 258: Performed by: INTERNAL MEDICINE

## 2023-12-08 PROCEDURE — 6360000002 HC RX W HCPCS: Performed by: INTERNAL MEDICINE

## 2023-12-08 PROCEDURE — 97530 THERAPEUTIC ACTIVITIES: CPT

## 2023-12-08 PROCEDURE — 6370000000 HC RX 637 (ALT 250 FOR IP): Performed by: STUDENT IN AN ORGANIZED HEALTH CARE EDUCATION/TRAINING PROGRAM

## 2023-12-08 PROCEDURE — 85025 COMPLETE CBC W/AUTO DIFF WBC: CPT

## 2023-12-08 PROCEDURE — 83735 ASSAY OF MAGNESIUM: CPT

## 2023-12-08 PROCEDURE — 2700000000 HC OXYGEN THERAPY PER DAY

## 2023-12-08 PROCEDURE — 36415 COLL VENOUS BLD VENIPUNCTURE: CPT

## 2023-12-08 RX ORDER — AMITRIPTYLINE HYDROCHLORIDE 25 MG/1
25 TABLET, FILM COATED ORAL NIGHTLY
Qty: 30 TABLET | Refills: 3 | Status: SHIPPED | OUTPATIENT
Start: 2023-12-08

## 2023-12-08 RX ORDER — FUROSEMIDE 40 MG/1
40 TABLET ORAL DAILY
Qty: 60 TABLET | Refills: 3 | Status: SHIPPED | OUTPATIENT
Start: 2023-12-09

## 2023-12-08 RX ORDER — LISINOPRIL 40 MG/1
40 TABLET ORAL DAILY
Qty: 30 TABLET | Refills: 3 | Status: SHIPPED | OUTPATIENT
Start: 2023-12-09

## 2023-12-08 RX ADMIN — POTASSIUM BICARBONATE 40 MEQ: 782 TABLET, EFFERVESCENT ORAL at 09:43

## 2023-12-08 RX ADMIN — METOPROLOL TARTRATE 25 MG: 25 TABLET, FILM COATED ORAL at 09:43

## 2023-12-08 RX ADMIN — SODIUM CHLORIDE, PRESERVATIVE FREE 10 ML: 5 INJECTION INTRAVENOUS at 09:44

## 2023-12-08 RX ADMIN — FUROSEMIDE 40 MG: 40 TABLET ORAL at 09:43

## 2023-12-08 RX ADMIN — LISINOPRIL 40 MG: 20 TABLET ORAL at 09:43

## 2023-12-08 RX ADMIN — ENOXAPARIN SODIUM 30 MG: 100 INJECTION SUBCUTANEOUS at 09:42

## 2023-12-08 NOTE — PROGRESS NOTES
Family at bedside. Personal belongings with pt. Discharged home via wheelchair and CNA assisting with transport.

## 2023-12-08 NOTE — CARE COORDINATION
MSN, CM:  patient to be discharged home today with home oxygen needed. [facility] to provide oxygen home needs. Patient and family agree with this discharge plan. Patient has met all milestones for this admission. Family to transport patient home. 12/08/23 1051   Service Assessment   Patient Orientation Alert and [address]  Discharge   Services At/After Discharge DME  ([facility] - O2)   Mode of Transport at Discharge Other (see comment)  (family to transport)   Confirm Follow Up Transport Family   Condition of Participation: Discharge Planning   The Patient and/or Patient Representative was provided with a Choice of Provider? Patient   The Patient and/Or Patient Representative agree with the Discharge Plan? Yes   Freedom of Choice list was provided with basic dialogue that supports the patient's individualized plan of care/goals, treatment preferences, and shares the quality data associated with the providers?   Yes

## 2023-12-08 NOTE — DISCHARGE SUMMARY
Hospitalist Discharge Summary   Admit Date:  2023  1:00 PM   DC Note date: 2023  Name:  Pradeep Rivera   Age:  66 y.o. Sex:  female  :  1960   MRN:  [id number]   Room:  [facility]  PCP:  None, None    Presenting Complaint: Shortness of Breath     Initial Admission Diagnosis: Acute pulmonary edema ([address]) [J81.0]  Hypoxia [R09.02]  Acute respiratory failure with hypoxia and hypercapnia (HCC) [J96.01, J96.02]  Acute congestive heart failure, unspecified heart failure type ([address]) [I50.9]     Problem List for this Hospitalization (present on admission):    Principal Problem:    Acute respiratory failure with hypoxia and hypercapnia ([address])  Active Problems: Morbidly obese (HCC)    Acute pulmonary edema (HCC)    Leukocytosis    Hypertensive urgency    Acute congestive heart failure ([address])    Suspected sleep apnea  Resolved Problems:    * No resolved hospital problems. [facility] Course:  66 y.o. female with history of morbid obesity, HTN, osteomyelitis of spine in  with MSSA bacteremia and tobacco use admitted with acute hypoxemic/hypercapnic respiratory failure secondary to fluid overload, admitted to ICU on BiPAP on . Started on IV diuresis, has been weaned to high flow nasal cannula and BiPAP at night on . Also noted with hypertensive urgency on admission, initially required nitroglycerin gtt. TTE was a technically difficult study, showing EF of 55-60%. Remaining study indeterminate. weaned from bipap to nasal cannula. Overall improvement with diuresis and starting GDMT. Will need       Hospitalist consulted  to assume care as primary. Qualified for home O2 with ambulatory study. Will discharge home with following changes: new meds - amitryptiline, ,metoprolol, and furosemide (lasix). Will discontinue hydrochlorothiazide. Will need to follow up with  pulmonology, and pcp after discharge. Disposition: Home  Diet: ADULT DIET;  Regular; 4 carb choices (60 gm/meal)  Code Status: Coronavirus OC43 by PCR NOT DETECTED        SARS-CoV-2, PCR NOT DETECTED        Human Metapneumovirus by PCR NOT DETECTED        Rhinovirus Enterovirus PCR NOT DETECTED        Influenza A by PCR NOT DETECTED        Influenza B PCR NOT DETECTED        Parainfluenza 1 PCR NOT DETECTED        Parainfluenza 2 PCR NOT DETECTED        Parainfluenza 3 PCR NOT DETECTED        Parainfluenza 4 PCR NOT DETECTED        Respiratory Syncytial Virus by PCR NOT DETECTED        Bordetella parapertussis by PCR NOT DETECTED        Bordetella pertussis by PCR NOT DETECTED        Chlamydophila Pneumonia PCR NOT DETECTED        Mycoplasma pneumo by PCR NOT DETECTED       Culture, Blood 1 [[id number]] Collected: 12/02/23 1846    Order Status: Completed Specimen: Blood Updated: 12/07/23 0642     Special Requests --        LEFT  ARM       Culture NO GROWTH 5 DAYS       Culture, Blood 2 [[id number]] Collected: 12/02/23 1846    Order Status: Completed Specimen: Blood Updated: 12/07/23 0642     Special Requests --        RIGHT  FOREARM       Culture NO GROWTH 5 DAYS       Influenza A/B, Molecular [[id number]] Collected: 12/02/23 1411    Order Status: Completed Specimen: Nasopharyngeal Updated: 12/02/23 1445     Influenza A, WALTER Not detected        Comment: Negative results do not preclude infection with influenza virus and should not be the sole basis of a patient treatment decision. Influenza B, WALTER Not detected       COVID-19, Rapid [[id number]] Collected: 12/02/23 1411    Order Status: Completed Specimen: Nasopharyngeal Updated: 12/02/23 1445     Source NASAL        SARS-CoV-2, Rapid Not detected        Comment:    The specimen is NEGATIVE for SARS-CoV-2, the novel coronavirus associated with COVID-19. A negative result does not rule out COVID-19. This test has been authorized by the FDA under an Emergency Use Authorization (EUA) for use by authorized laboratories.       Fact sheet for Healthcare Providers: ConventionUpdate.co.nz  Fact sheet for Patients: ConventionUpdate.co.nz     Methodology: Isothermal Nucleic Acid Amplification                 All Labs from Last 24 Hrs:  Recent Results (from the past 24 hour(s))   Magnesium    Collection Time: 12/08/23  6:18 AM   Result Value Ref Range    Magnesium 2.4 1.8 - 2.4 mg/dL   CBC with Auto Differential    Collection Time: 12/08/23  6:18 AM   Result Value Ref Range    WBC 10.9 4.3 - 11.1 K/uL    RBC 4.69 4.05 - 5.2 M/uL    Hemoglobin 13.1 11.7 - 15.4 g/dL    Hematocrit 43.6 35.8 - 46.3 %    MCV 93.0 82 - 102 FL    MCH 27.9 26.1 - 32.9 PG    MCHC 30.0 (L) 31.4 - 35.0 g/dL    RDW 16.0 (H) 11.9 - 14.6 %    Platelets 399 909 - 708 K/uL    MPV 12.0 9.4 - 12.3 FL    nRBC 0.00 0.0 - 0.2 K/uL    Differential Type AUTOMATED      Neutrophils % 71 43 - 78 %    Lymphocytes % 17 13 - 44 %    Monocytes % 7 4.0 - 12.0 %    Eosinophils % 5 0.5 - 7.8 %    Basophils % 1 0.0 - 2.0 %    Immature Granulocytes 1 0.0 - 5.0 %    Neutrophils Absolute 7.7 1.7 - 8.2 K/UL    Lymphocytes Absolute 1.8 0.5 - 4.6 K/UL    Monocytes Absolute 0.7 0.1 - 1.3 K/UL    Eosinophils Absolute 0.5 0.0 - 0.8 K/UL    Basophils Absolute 0.1 0.0 - 0.2 K/UL    Absolute Immature Granulocyte 0.1 0.0 - 0.5 K/UL   Basic Metabolic Panel w/ Reflex to MG    Collection Time: 12/08/23  6:18 AM   Result Value Ref Range    Sodium 141 136 - 146 mmol/L    Potassium 3.5 3.5 - 5.1 mmol/L    Chloride 100 (L) 103 - 113 mmol/L    CO2 38 (H) 21 - 32 mmol/L    Anion Gap 3 2 - 11 mmol/L    Glucose 97 65 - 100 mg/dL    BUN 18 8 - 23 MG/DL    Creatinine 0.70 0.6 - 1.0 MG/DL    Est, Glom Filt Rate >60 >60 ml/min/1.73m2    Calcium 9.2 8.3 - 10.4 MG/DL       Allergies   Allergen Reactions    Hydrocodone-Acetaminophen Nausea And Vomiting     Immunization History   Administered Date(s) Administered    PPD Test 04/06/2021    Td vaccine (adult) 08/16/1993       Recent Vital Data:  Patient Vitals for the past 24

## 2023-12-08 NOTE — PROGRESS NOTES
ACUTE PHYSICAL THERAPY GOALS:   (Developed with and agreed upon by patient and/or caregiver. )  LTG:  (1.)Ms. Álvaro Ronquillo will move from supine to sit and sit to supine , scoot up and down, and roll side to side in bed with INDEPENDENCE within 7 treatment day(s). (2.)Ms. Álvaro Ronquillo will transfer from bed to chair and chair to bed with MODIFIED INDEPENDENCE using the least restrictive device within 7 treatment day(s). (3.)Ms. Álvaro Ronquillo will ambulate with MODIFIED INDEPENDENCE for 100 feet with the least restrictive device within 7 treatment day(s). (4.)Ms. Álvaro Ronquillo will participate in therapeutic activity/exercises x 25 minutes for increased activity tolerance with SpO2 above 90% within 7 treatment days. (5.)Ms. Álvaro Ronquillo will propel manual wheelchair for 200 ft with supervision for improved independence within 7 treatment days. PHYSICAL THERAPY: Daily Note AM   (Link to Caseload Tracking: PT Visit Days : 3  Time In/Out PT Charge Capture  Rehab Caseload Tracker  Orders    Wicho Mcneill is a 66 y.o. female   PRIMARY DIAGNOSIS: Acute respiratory failure with hypoxia and hypercapnia (HCC)  Acute pulmonary edema (HCC) [J81.0]  Hypoxia [R09.02]  Acute respiratory failure with hypoxia and hypercapnia (HCC) [J96.01, J96.02]  Acute congestive heart failure, unspecified heart failure type ([address]) [I50.9]       Inpatient: Payor: Claudean Buggy / Plan: Claudean Buggy / Product Type: *No Product type* /     ASSESSMENT:     REHAB RECOMMENDATIONS:   Recommendation to date pending progress:  Setting:  Home Health Therapy    Equipment:    To Be Determined     ASSESSMENT:  Ms. Álvaro Ronquillo was supine upon contact and agreeable to PT. Today, patient performed supine to sit with mod I and transfer to standing with SBA. Once standing patient ambulated 25' x 2 with rolling walker, SBA-CGA and cues for sequencing/pursed lipped breathing. Patient took a seated rest break in between ambulation bouts.  Patient ambulated on 2L O2 with sats maintaining above 90%

## 2023-12-08 NOTE — PROGRESS NOTES
[facility]  [facility] Date: 12/2/2023         Daily Progress Note: 12/8/2023    The patient's chart is reviewed and the patient is discussed with the staff. Background:   66 y.o. female presents with a history of obesity, significant HTN, osteomyelitis of spine in 2020 with MSSA bacteremia. She presented to the ER 12/2 with c/o increased SOB. Per report she was found to have a RA sat of 56% by EMS. She was placed on NRB. Abg showed CO2 in the 70's so she was placed on Bipap. She was in hypertensive urgency with highest  and 's. Initial w/u significant for WBC of 16, CXR with cardiomegaly, pulmonary vascular congestion, B infiltrates, small L effusion suggestive of pulmonary edema. BNP was only 434. Last TTE 2020 with normal EF. Rapid covid and flu were negative. She states she has been noticing increased LE edema recently above her baseline. No real change in her weight. No fever, chills, myalgias or other infectious symptoms. She states she is not on O2 at home, does not have a cpap. Hx of stomach sleeping until spine surgery and sepsis, then broke hip, which again impacted her sleep. Has been sleeping in recliner  No diagnosed lung disease. She occasionally smokes marijuana but denies vaping. Subjective:     Feels better but has dry mouth and body aches from being in bed. Discussed plan to go home with oxygen and need for follow up with sleep study. Leg edema much better. Denies drinking much fluid at home.      Current Facility-Administered Medications   Medication Dose Route Frequency    amitriptyline (ELAVIL) tablet 25 mg  25 mg Oral Nightly    furosemide (LASIX) tablet 40 mg  40 mg Oral Daily    potassium bicarb-citric acid (EFFER-K) effervescent tablet 40 mEq  40 mEq Oral BID    melatonin tablet 3 mg  3 mg Oral Nightly PRN    medicated lip ointment (BLISTEX)   Topical PRN    lisinopril (PRINIVIL;ZESTRIL) tablet 40 mg  40 mg Oral Daily calm, appropriate, oriented     Imaging: I performed an independent interpretation of the patient's images. CXR:  12/6 12/4      LAB:  Recent Labs     12/06/23  0250 12/07/23  0338 12/08/23  0618   WBC 11.1 12.2* 10.9   HGB 12.3 12.9 13.1   HCT 41.6 43.3 43.6    236 270     Recent Labs     12/06/23  0250 12/07/23  0338 12/08/23 0618    138 141   K 3.4* 3.3* 3.5   CL 96* 97* 100*   CO2 39* 37* 38*   BUN 19 15 18   CREATININE 0.80 0.60 0.70   MG 2.4 2.7* 2.4     No results for input(s): \"TROPHS\", \"NTPROBNP\", \"CRP\", \"ESR\" in the last 72 hours. Recent Labs     12/06/23  0250 12/07/23 0338 12/08/23 0618   GLUCOSE 86 94 97      Microbiology:   No results for input(s): \"CULTURE\" in the last 72 hours. ECHO: 12/02/23    ECHO (TTE) COMPLETE (PRN CONTRAST/BUBBLE/STRAIN/3D) 12/03/2023  6:42 PM (Final)    Interpretation Summary    Left Ventricle: Normal left ventricular systolic function with a visually estimated EF of 55 - 60%. Left ventricle size is normal. Moderately increased wall thickness. Technically challenging study of limited diagnostic utility. Signed by: Santino Jackson MD on 12/3/2023  6:42 PM    Assessment and Plan:  (Medical Decision Making)   Impression:  Pt with history of severe obesity, tobacco abuse, no known lung or cardiac disease. Presenting with a couple days of increased SOB and LE edema. WBC elevated and CXR with B infiltrates. Covid and flu negative. Somewhat unclear if this is pulmonary edema or infection. Tx with diuresis with clinical improvement but remains hypoxic. Will need home oxygen. Plans for outpatient PSG. Principal Problem:    Acute respiratory failure with hypoxia and hypercapnia ([address])  Plan: Will need 3 liters oxygen with exertion. RA sat 86%. CXR has improved with diuresis. Continue daily lasix. She has a blood pressure machine at home to monitor BP.   Plans for outpatient PSG - order

## 2023-12-08 NOTE — PROGRESS NOTES
Discharge orders noted. Discharge instruction reviewed and given to patient. Verbalizes understanding. Prescription  e-scribed to pt's choice of pharmacy by the provider. Peripheral IV removed with cath tip intact post removal. Exit site pink. No personal belongings or home medication to be returned back to patient prior to discharge. Understands that Pulmonary office will call in 1-2 business days to scheduled follow up visit. Pt also verbalizes understanding to that if she has not heard from the office after 2 full business days, she is to call the office to arrange follow up. Portable O2 tank at bedside. Aware to use O2 as directed by the provider. Aware to call the desk when ready for wheelchair.

## 2023-12-08 NOTE — PROGRESS NOTES
Bedside report received from night nurse Daniel. Assessment done as noted  Respiration even and unlabored 20/min; denies pain or nausea at present. Unable to wean off O2 overnight per night nurse. Per nurse report, pt d-sats terra low 70s on RA. Remains on O2 2liters via NC to maintain sats above 90%. Encouraged to call with needs.

## 2023-12-13 ENCOUNTER — TELEPHONE (OUTPATIENT)
Dept: SLEEP MEDICINE | Age: 63
End: 2023-12-13

## 2023-12-13 DIAGNOSIS — R06.83 SNORING: Primary | ICD-10-CM

## 2023-12-14 NOTE — TELEPHONE ENCOUNTER
----- Message from Lizette Joshi sent at 12/8/2023 10:33 AM EST -----  Regarding: FW: HOSP F/U    ----- Message -----  From: MARIA L Valdez CNP  Sent: 12/6/2023  10:05 AM EST  To: #  Subject: HOSP F/U                                         Please contact patient and arrange a MINS: 40 minute SLEEP apptEstablished Patient Hospital F/U  in 4-6 weeks with split night sleep study prior to appointment. Patient is being discharged to home.              Thank you,   MARIA L Valdez CNP

## 2023-12-15 ENCOUNTER — TELEPHONE (OUTPATIENT)
Dept: PULMONOLOGY | Age: 63
End: 2023-12-15

## 2023-12-15 NOTE — TELEPHONE ENCOUNTER
For clarification of phone note from , pt needs hospital f/up with spirometry and PSG. Pt d/c 12/8. [facility] is handling PSG and sleep appt per notes.     Pt needs 40 min hospital f/up appt as new patient to [facility] with spirometry and ambulatory SAT since she was d/c on oxygen.

## 2023-12-15 NOTE — TELEPHONE ENCOUNTER
Patient is calling asking to make Hospital follow up with stress test.  Wants to know if we do these here

## 2023-12-20 NOTE — PROGRESS NOTES
Physician Progress Note      PATIENT:               Monique Brown  CSN #:                  [id number]  :                       1960  ADMIT DATE:       2023 1:00 PM  [facility] DATE:        2023 3:41 PM  RESPONDING  PROVIDER #:        Edd Campoverde MD          QUERY TEXT:    Pt admitted with acute respiratory failure and has CHF documented. If   possible, please document in progress notes and discharge summary further   specificity regarding the type and acuity of CHF:    The medical record reflects the following:  Risk Factors: 66 YOF with PMH of HTN, obesity, smoker admitted with SOB and LE   edema  Clinical Indicators: CXR cardiomegaly, pulmonary vascular congestion, B   infiltrates, small L effusion suggestive of pulmonary edema. , Per ED   Provider Note:  Severe CHF exacerbation requiring nitroglycerin drip and   BiPAP. Treatment: Lasix  Options provided:  -- Acute on Chronic Systolic CHF/HFrEF  -- Acute on Chronic Diastolic CHF/HFpEF  -- Acute on Chronic Systolic and Diastolic CHF  -- Acute Systolic CHF/HFrEF  -- Acute Diastolic CHF/HFpEF  -- Acute Systolic and Diastolic CHF  -- Chronic Systolic CHF/HFrEF  -- Chronic Diastolic CHF/HFpEF  -- Chronic Systolic and Diastolic CHF  -- Other - I will add my own diagnosis  -- Disagree - Not applicable / Not valid  -- Disagree - Clinically unable to determine / Unknown  -- Refer to Clinical Documentation Reviewer    PROVIDER RESPONSE TEXT:    This patient is in acute on chronic systolic CHF/HFrEF. Query created by: Genie Marin on 2023 10:48 AM      Electronically signed by:   Edd Campoverde MD  7:01 AM

## (undated) DEVICE — K-WIRE, STERILE

## (undated) DEVICE — SOLUTION IV 1000ML 0.9% SOD CHL

## (undated) DEVICE — 3.2MM GUIDE WIRE 400MM

## (undated) DEVICE — DRILL, AO, STERILE

## (undated) DEVICE — DRAPE,U/SHT,SPLIT,FILM,60X84,STERILE: Brand: MEDLINE

## (undated) DEVICE — ROD RMR L950MM DIA2.5MM W/ EXTN BALL TIP

## (undated) DEVICE — PREP SKN CHLRAPRP APL 26ML STR --

## (undated) DEVICE — GUIDE WIRE, BALL-TIPPED, STERILE

## (undated) DEVICE — SHEET, DRAPE, SPLIT, STERILE: Brand: MEDLINE

## (undated) DEVICE — DRAPE C-ARMOUR C-ARM KIT --

## (undated) DEVICE — REAMER SHAFT, MOD.TRINKLE: Brand: BIXCUT

## (undated) DEVICE — DRAPE SHT 3 QTR PROXIMA 53X77 --

## (undated) DEVICE — SUTURE MCRYL SZ 2-0 L27IN ABSRB UD SH L26MM TAPERPOINT NDL Y417H

## (undated) DEVICE — 3M™ IOBAN™ 2 ANTIMICROBIAL INCISE DRAPE 6650EZ: Brand: IOBAN™ 2

## (undated) DEVICE — Device

## (undated) DEVICE — LOWER EXTREMITY: Brand: MEDLINE INDUSTRIES, INC.

## (undated) DEVICE — INTENDED FOR TISSUE SEPARATION, AND OTHER PROCEDURES THAT REQUIRE A SHARP SURGICAL BLADE TO PUNCTURE OR CUT.: Brand: BARD-PARKER SAFETY BLADES SIZE 10, STERILE

## (undated) DEVICE — 7 DAY SILVER-COATED ANTIMICROBIAL BARRIER DRESSING: Brand: ACTICOAT 7  4" X 5"